# Patient Record
Sex: FEMALE | Race: WHITE | Employment: FULL TIME | URBAN - METROPOLITAN AREA
[De-identification: names, ages, dates, MRNs, and addresses within clinical notes are randomized per-mention and may not be internally consistent; named-entity substitution may affect disease eponyms.]

---

## 2018-01-09 ENCOUNTER — OFFICE VISIT (OUTPATIENT)
Dept: ORTHOPEDIC SURGERY | Age: 58
End: 2018-01-09

## 2018-01-09 VITALS
DIASTOLIC BLOOD PRESSURE: 77 MMHG | WEIGHT: 159 LBS | HEIGHT: 65 IN | SYSTOLIC BLOOD PRESSURE: 137 MMHG | RESPIRATION RATE: 14 BRPM | BODY MASS INDEX: 26.49 KG/M2 | HEART RATE: 78 BPM | OXYGEN SATURATION: 100 %

## 2018-01-09 DIAGNOSIS — M25.511 RIGHT SHOULDER PAIN, UNSPECIFIED CHRONICITY: ICD-10-CM

## 2018-01-09 DIAGNOSIS — M75.51 SUBACROMIAL BURSITIS OF RIGHT SHOULDER JOINT: Primary | ICD-10-CM

## 2018-01-09 RX ORDER — ZOLPIDEM TARTRATE 5 MG/1
5 TABLET ORAL
COMMUNITY

## 2018-01-09 RX ORDER — TRIAMCINOLONE ACETONIDE 40 MG/ML
40 INJECTION, SUSPENSION INTRA-ARTICULAR; INTRAMUSCULAR ONCE
Qty: 1 ML | Refills: 0
Start: 2018-01-09 | End: 2018-01-09

## 2018-01-09 NOTE — PROGRESS NOTES
Rebekah Fatima  1960   Chief Complaint   Patient presents with    Shoulder Pain     RT        HISTORY OF PRESENT ILLNESS  Rebekah Fatima is a 59 y.o. female who presents today for evaluation of right shoulder pain. Patient was seen by Dr. Bekah Brennan in the past. she rates her pain 8/10 today. Pain has been present for three weeks. She has a knot over the right shoulder. Patient describes the pain as aching, numbing and throbbing that is Constant in nature. Symptoms are worse with certain motions of the arm, Activity and is better with  Rest. Associated symptoms include pulling, numbing, Swelling. Since problem started, it: has worsened. Pain does wake patient up at night. Has taken half a Vicodin for the problem. Has tried following treatments: Injections:NO; Brace:NO; Therapy:NO; Cane/Crutch:NO       No Known Allergies     Past Medical History:   Diagnosis Date    Bell's palsy     Cardiac nuclear imaging test, normal 07/13/2012    Low risk. No ischemia or prior infarction. No RWMA. EF 64%. Nondiagnostic EKG on EST. Ex time 10 min 41 sec.  Lump     Palm of the left hand      Social History     Social History    Marital status:      Spouse name: N/A    Number of children: N/A    Years of education: N/A     Occupational History    Not on file.      Social History Main Topics    Smoking status: Never Smoker    Smokeless tobacco: Never Used    Alcohol use No    Drug use: Not on file    Sexual activity: Not on file     Other Topics Concern    Not on file     Social History Narrative      Past Surgical History:   Procedure Laterality Date    HAND/FINGER SURGERY UNLISTED Left     cyst removal    HX APPENDECTOMY      HX CHOLECYSTECTOMY      HX GASTRIC BYPASS      HX HYSTERECTOMY  2008      Family History   Problem Relation Age of Onset    Diabetes Mother     Hypertension Mother       Current Outpatient Prescriptions   Medication Sig    zolpidem (AMBIEN) 5 mg tablet Take  by mouth nightly as needed for Sleep.  triamcinolone acetonide (KENALOG) 40 mg/mL injection 1 mL by IntraMUSCular route once for 1 dose. No current facility-administered medications for this visit. REVIEW OF SYSTEM   Patient denies: Weight loss, Fever/Chills, HA, Visual changes, Fatigue, Chest pain, SOB, Abdominal pain, N/V/D/C, Blood in stool or urine, Edema. Pertinent positive as above in HPI. All others were negative    PHYSICAL EXAM:   Visit Vitals    /77 (BP 1 Location: Left arm, BP Patient Position: Sitting)    Pulse 78    Resp 14    Ht 5' 5\" (1.651 m)    Wt 159 lb (72.1 kg)    SpO2 100%    BMI 26.46 kg/m2     The patient is a well-developed, well-nourished female   in no acute distress. The patient is alert and oriented times three. The patient is alert and oriented times three. Mood and affect are normal.  LYMPHATIC: lymph nodes are not enlarged and are within normal limits  SKIN: normal in color and non tender to palpation. There are no bruises or abrasions noted. NEUROLOGICAL: Motor sensory exam is within normal limits. Reflexes are equal bilaterally. There is normal sensation to pinprick and light touch  MUSCULOSKELETAL:  Examination Right shoulder   Skin Intact   AC joint tenderness -   Biceps tenderness -   Forward flexion/Elevation    Active abduction    Glenohumeral abduction 90   External rotation ROM 90   Internal rotation ROM 70   Apprehension -   Tatos Relocation -   Jerk -   Load and Shift -   Obriens -   Speeds -   Impingement sign +   Supraspinatus/Empty Can -, 5/5   External Rotation Strength -, 5/5   Lift Off/Belly Press -, 5/5   Neurovascular Intact       PROCEDURE: After sterile prep, 6 cc of Xylocaine and 1 cc of Kenalog were injected into the right shoulder.        [facility] - [facility]  OFFICE PROCEDURE PROGRESS NOTE        Chart reviewed for the following:  Hieu Gutierrez MD, have reviewed the History, Physical and updated the Allergic reactions for [address] performed immediately prior to start of procedure:  I, Catherine Staley MD, have performed the following reviews on Ankita Mckenna prior to the start of the procedure:            * Patient was identified by name and date of birth   * Agreement on procedure being performed was verified  * Risks and Benefits explained to the patient  * Procedure site verified and marked as necessary  * Patient was positioned for comfort  * Consent was signed and verified     Time: 9:47 AM    Date of procedure: 1/9/2018    Procedure performed by:  Catherine Staley MD    Provider assisted by: (see medication administration)    How tolerated by patient: tolerated the procedure well with no complications    Comments: none      IMAGING: XR of the right shoulder dated 1/9/18 was reviewed and read: normal      IMPRESSION:      ICD-10-CM ICD-9-CM    1. Subacromial bursitis of right shoulder joint M75.51 726.19 TRIAMCINOLONE ACETONIDE INJ      triamcinolone acetonide (KENALOG) 40 mg/mL injection      DRAIN/INJECT LARGE JOINT/BURSA   2. Right shoulder pain, unspecified chronicity M25.511 719.41 AMB POC XRAY, SHOULDER; COMPLETE, 2+        PLAN:  1. I would like to try an injection in her right shoulder to assess whether the pain is coming from her shoulder or neck. Risk factors include: gastric bypass, no NSAIDs  2. Yes cortisone injection indicated today R SHOULDER  3. No Physical/Occupational Therapy indicated today  4. No diagnostic test indicated today  5. No durable medical equipment indicated today  6. No referral indicated today   7. No medications indicated today  8. No Narcotic indicated today     RTC 2 weeks  Follow-up Disposition: Not on File    Scribed by [facility]) as dictated by MD DALTON Leon, Dr. Catherine Staley, confirm that all documentation is accurate.     Catherine Staley M.D.   [facility]

## 2018-07-24 ENCOUNTER — OFFICE VISIT (OUTPATIENT)
Dept: ORTHOPEDIC SURGERY | Age: 58
End: 2018-07-24

## 2018-07-24 VITALS
DIASTOLIC BLOOD PRESSURE: 61 MMHG | HEIGHT: 65 IN | RESPIRATION RATE: 16 BRPM | SYSTOLIC BLOOD PRESSURE: 119 MMHG | HEART RATE: 63 BPM | TEMPERATURE: 97.6 F | WEIGHT: 166.8 LBS | BODY MASS INDEX: 27.79 KG/M2 | OXYGEN SATURATION: 100 %

## 2018-07-24 DIAGNOSIS — M75.51 SUBACROMIAL BURSITIS OF RIGHT SHOULDER JOINT: Primary | ICD-10-CM

## 2018-07-24 DIAGNOSIS — M54.12 CERVICAL RADICULOPATHY: ICD-10-CM

## 2018-07-24 RX ORDER — BACLOFEN 10 MG/1
10 TABLET ORAL 3 TIMES DAILY
Qty: 40 TAB | Refills: 0 | Status: SHIPPED | OUTPATIENT
Start: 2018-07-24 | End: 2018-11-04

## 2018-07-24 RX ORDER — TRIAMCINOLONE ACETONIDE 40 MG/ML
40 INJECTION, SUSPENSION INTRA-ARTICULAR; INTRAMUSCULAR ONCE
Qty: 1 ML | Refills: 0
Start: 2018-07-24 | End: 2018-07-24

## 2018-07-24 RX ORDER — IBUPROFEN 400 MG/1
TABLET ORAL
COMMUNITY
End: 2022-08-23

## 2018-07-24 NOTE — PROGRESS NOTES
Ritesh Camacho 1960 Chief Complaint Patient presents with  Shoulder Pain  
  R SHOULDER PAIN, F/U APPT. HISTORY OF PRESENT ILLNESS Ritesh Camacho is a 59 y.o. female who presents today for reevaluation of right shoulder pain. Patient rates pain as 8/10 today. has been present for months. She has a knot over the right shoulder. At last OV, patient had a cortisone injection which provided some relief, about 3 months. Patient notes some numbness in the arm. Constant dull pain without movements, but pain with certain movements. Patient denies any fever, chills, chest pain, shortness of breath or calf pain. There are no new illness or injuries to report since last seen in the office. There are no changes to medications, allergies, family or social history. PHYSICAL EXAM:  
Visit Vitals  /61  Pulse 63  Temp 97.6 °F (36.4 °C) (Oral)  Resp 16  
 Ht 5' 5\" (1.651 m)  Wt 166 lb 12.8 oz (75.7 kg)  SpO2 100%  BMI 27.76 kg/m2 The patient is a well-developed, well-nourished female   in no acute distress. The patient is alert and oriented times three. The patient is alert and oriented times three. Mood and affect are normal. 
LYMPHATIC: lymph nodes are not enlarged and are within normal limits SKIN: normal in color and non tender to palpation. There are no bruises or abrasions noted. NEUROLOGICAL: Motor sensory exam is within normal limits. Reflexes are equal bilaterally. There is normal sensation to pinprick and light touch MUSCULOSKELETAL: 
Examination Right shoulder Skin Intact AC joint tenderness - Biceps tenderness - Forward flexion/Elevation  Active abduction  Glenohumeral abduction 90 External rotation ROM 90 Internal rotation ROM 70 Apprehension -  
Tatos Relocation -  
Jerk - Load and Shift -  
Laverda Snowman - Speeds - Impingement sign + Supraspinatus/Empty Can + External Rotation Strength -, 5/5 Lift Off/Belly Press -, 5/5 Neurovascular Intact  
  
 
PROCEDURE: Right Shoulder Injection with Ultrasound Guidance After sterile prep, 6 cc of Xylocaine and 1 cc of Kenalog were injected into the right shoulder. Ultrasound images captured using [address] Ultrasound machine and scanned into patient's chart. [facility] [facility] 
OFFICE PROCEDURE PROGRESS NOTE Chart reviewed for the following: 
Enid Isaac M.D, have reviewed the History, Physical and updated the Allergic reactions for Omari Biggs TIME OUT performed immediately prior to start of procedure: 
I, Buck Foss M.D, have performed the following reviews on Omari Biggs prior to the start of the procedure: 
         
* Patient was identified by name and date of birth * Agreement on procedure being performed was verified * Risks and Benefits explained to the patient * Procedure site verified and marked as necessary * Patient was positioned for comfort * Consent was signed and verified Time: 8:31 AM  
 
Date of procedure: 7/24/2018 Procedure performed by:  Buck Fsos M.D Provider assisted by: (see medication administration) How tolerated by patient: tolerated the procedure well with no complications Comments: none IMAGING: XR of the right shoulder dated 1/9/18 was reviewed and read: normal 
 
IMPRESSION:   
  ICD-10-CM ICD-9-CM 1. Subacromial bursitis of right shoulder joint M75.51 726.19 REFERRAL TO PHYSICAL THERAPY  
   MRI SHOULDER RT WO CONT  
   TRIAMCINOLONE ACETONIDE INJ  
   triamcinolone acetonide (KENALOG) 40 mg/mL injection US GUIDE INJ/ASP/ARTHRO LG JNT/BURSA  
   baclofen (LIORESAL) 10 mg tablet 2. Cervical radiculopathy M54.12 723.4 REFERRAL TO PHYSICAL THERAPY  
   baclofen (LIORESAL) 10 mg tablet PLAN:  
1. Patient presents today with returning shoulder and neck pain.  I would like her to get an MRI and I am hopeful an injection today will help. Risk factors include: gastric bypass, no NSAIDs 2. No ultrasound exam indicated today 3. Yes cortisone injection indicated today R SHOULDER US 
4. Yes Physical Therapy indicated today 5. Yes diagnostic test indicated today: MRI R SHOULDER 
6. No durable medical equipment indicated today 7. No referral indicated today 8. Yes medications indicated today: BACLOFEN 9. No Narcotic indicated today RTC following MRI Follow-up Disposition: Not on File Scribed by [facility] ([address] [place] [address] [address]) as dictated by Rolando Haines MD 
 
I, Dr. Rolando Haines, confirm that all documentation is accurate.  
 
Rolando Haines M.D.  
[facility]

## 2018-07-30 ENCOUNTER — HOSPITAL ENCOUNTER (OUTPATIENT)
Age: 58
Discharge: HOME | End: 2018-07-30
Attending: ORTHOPAEDIC SURGERY
Payer: COMMERCIAL

## 2018-07-30 DIAGNOSIS — M75.51 SUBACROMIAL BURSITIS OF RIGHT SHOULDER JOINT: ICD-10-CM

## 2018-07-30 PROCEDURE — 73221 MRI JOINT UPR EXTREM W/O DYE: CPT

## 2018-08-08 ENCOUNTER — OFFICE VISIT (OUTPATIENT)
Dept: ORTHOPEDIC SURGERY | Age: 58
End: 2018-08-08

## 2018-08-08 VITALS
RESPIRATION RATE: 16 BRPM | HEIGHT: 65 IN | WEIGHT: 162 LBS | SYSTOLIC BLOOD PRESSURE: 127 MMHG | HEART RATE: 85 BPM | OXYGEN SATURATION: 96 % | DIASTOLIC BLOOD PRESSURE: 73 MMHG | BODY MASS INDEX: 26.99 KG/M2 | TEMPERATURE: 97.8 F

## 2018-08-08 DIAGNOSIS — M75.121 COMPLETE TEAR OF RIGHT ROTATOR CUFF: Primary | ICD-10-CM

## 2018-08-08 NOTE — PROGRESS NOTES
Sherryle Limerick  1960   Chief Complaint   Patient presents with    Shoulder Pain     right shoulder MRI results         HISTORY OF PRESENT ILLNESS  Sherryle Limerick is a 59 y.o. female who presents today for reevaluation of right shoulder pain and MRI review Patient rates pain as 3/10 today. Pain has been present for months. She has a knot over the right shoulder. At last OV, patient had a cortisone injection which provided good relief. Patient notes some numbness in the arm. Constant dull pain without movements, but pain with certain movements. Patient notes she is left-handed, therefore she doesn't have to use her right arm very much. Patient denies any fever, chills, chest pain, shortness of breath or calf pain. There are no new illness or injuries to report since last seen in the office. There are no changes to medications, allergies, family or social history. PHYSICAL EXAM:   Visit Vitals    /73    Pulse 85    Temp 97.8 °F (36.6 °C) (Oral)    Resp 16    Ht 5' 5\" (1.651 m)    Wt 162 lb (73.5 kg)    SpO2 96%    BMI 26.96 kg/m2     The patient is a well-developed, well-nourished female   in no acute distress. The patient is alert and oriented times three. The patient is alert and oriented times three. Mood and affect are normal.  LYMPHATIC: lymph nodes are not enlarged and are within normal limits  SKIN: normal in color and non tender to palpation. There are no bruises or abrasions noted. NEUROLOGICAL: Motor sensory exam is within normal limits. Reflexes are equal bilaterally.  There is normal sensation to pinprick and light touch  MUSCULOSKELETAL:  Examination Right shoulder   Skin Intact   AC joint tenderness -   Biceps tenderness -   Forward flexion/Elevation    Active abduction    Glenohumeral abduction 90   External rotation ROM 90   Internal rotation ROM 70   Apprehension -   Tatos Relocation -   Jerk -   Load and Shift -   Obriens -   Speeds -   Impingement sign +   Supraspinatus/Empty Can +   External Rotation Strength -, 5/5   Lift Off/Belly Press -, 5/5   Neurovascular Intact        IMAGING: MRI of the right shoulder dated 7/30/18 was reviewed and read: Impression:  1. Supraspinatus severe tendinosis and full-thickness tendon rupture at the  insertion as described in findings. 2. Infraspinatus moderate tendinosis with undersurface fraying and small  interstitial partial thickness tear. 3. Subscapularis mild tendinosis and punctate interstitial tears at the  insertion. 4. Intra-articular biceps tendinosis and likely partial split tearing at the  entrance of the groove. 5. Degeneration of the superior labrum. 6. Subacromial spurring and subacromial subdeltoid bursitis. XR of the right shoulder dated 1/9/18 was reviewed and read: normal    IMPRESSION:      ICD-10-CM ICD-9-CM    1. Complete tear of right rotator cuff M75.121 727.61         PLAN:   1. I discussed the risks and benefits and potential adverse outcomes of both operative vs non operative treatment of rotator cuff tear of the right shoulder with the patient. Patient wishes to proceed with arthroscopic rotator cuff repair. Risks of operative intervention include but not limited to bleeding, infection, deep vein thrombosis, pulmonary embolism, death, limb length discrepancy, reflexive sympathetic dystrophy, fat embolism syndrome,damage to blood vessels and nerves, malunion, non-union, delayed union, failure of hardware, post traumatic arthritis, stroke, heart attack, and death. Patient understands that infection may arise and may require numerous surgeries. History and physical exam scheduled for a later date. Risk factors include: gastric bypass, no NSAIDs  2. No ultrasound exam indicated today  3. No cortisone injection indicated today   4. No Physical Therapy indicated today  5. No diagnostic test indicated today:    6. No durable medical equipment indicated today  7.  No referral indicated today   8. No medications indicated today:    9.  No Narcotic indicated today       RTC H&P  Follow-up Disposition: Not on File    Scribed by Thien Nascimento [facility]) as dictated by LUIS Crockett, [facility]

## 2018-08-09 DIAGNOSIS — Z01.818 PREOP EXAMINATION: ICD-10-CM

## 2018-08-09 DIAGNOSIS — M75.121 COMPLETE TEAR OF RIGHT ROTATOR CUFF: Primary | ICD-10-CM

## 2018-08-28 ENCOUNTER — TELEPHONE (OUTPATIENT)
Dept: ORTHOPEDIC SURGERY | Age: 58
End: 2018-08-28

## 2018-08-28 RX ORDER — BACLOFEN 10 MG/1
10 TABLET ORAL 3 TIMES DAILY
Qty: 90 TAB | Refills: 1 | Status: SHIPPED | OUTPATIENT
Start: 2018-08-28 | End: 2018-09-11 | Stop reason: SDUPTHER

## 2018-08-28 NOTE — TELEPHONE ENCOUNTER
Patient is scheduled for RCR sx on 9/20 and is having a lot of pain in the shoulder that is traveling to her neck. She is requesting something for pain until her sx date. She states she cannot take any anti inflammatory medication due to having gastric bypass in the past.  Patient uses [employer] [facility] [employer]. Please advise.

## 2018-09-07 ENCOUNTER — HOSPITAL ENCOUNTER (OUTPATIENT)
Dept: LAB | Age: 58
Discharge: HOME | End: 2018-09-07
Payer: COMMERCIAL

## 2018-09-07 DIAGNOSIS — Z01.818 PREOP EXAMINATION: ICD-10-CM

## 2018-09-07 LAB
ANION GAP SERPL CALC-SCNC: 11 MMOL/L (ref 3–18)
BASOPHILS # BLD: 0 K/UL (ref 0–0.1)
BASOPHILS NFR BLD: 0 % (ref 0–2)
BUN SERPL-MCNC: 16 MG/DL (ref 7–18)
BUN/CREAT SERPL: 18 (ref 12–20)
CALCIUM SERPL-MCNC: 9.3 MG/DL (ref 8.5–10.1)
CHLORIDE SERPL-SCNC: 108 MMOL/L (ref 100–108)
CO2 SERPL-SCNC: 25 MMOL/L (ref 21–32)
CREAT SERPL-MCNC: 0.88 MG/DL (ref 0.6–1.3)
DIFFERENTIAL METHOD BLD: ABNORMAL
EOSINOPHIL # BLD: 0.1 K/UL (ref 0–0.4)
EOSINOPHIL NFR BLD: 1 % (ref 0–5)
ERYTHROCYTE [DISTWIDTH] IN BLOOD BY AUTOMATED COUNT: 16.5 % (ref 11.6–14.5)
GLUCOSE SERPL-MCNC: 86 MG/DL (ref 74–99)
HCT VFR BLD AUTO: 30.3 % (ref 35–45)
HGB BLD-MCNC: 9.3 G/DL (ref 12–16)
LYMPHOCYTES # BLD: 2.9 K/UL (ref 0.9–3.6)
LYMPHOCYTES NFR BLD: 39 % (ref 21–52)
MCH RBC QN AUTO: 23 PG (ref 24–34)
MCHC RBC AUTO-ENTMCNC: 30.7 G/DL (ref 31–37)
MCV RBC AUTO: 75 FL (ref 74–97)
MONOCYTES # BLD: 0.8 K/UL (ref 0.05–1.2)
MONOCYTES NFR BLD: 10 % (ref 3–10)
NEUTS SEG # BLD: 3.7 K/UL (ref 1.8–8)
NEUTS SEG NFR BLD: 50 % (ref 40–73)
PLATELET # BLD AUTO: 386 K/UL (ref 135–420)
PMV BLD AUTO: 11.3 FL (ref 9.2–11.8)
POTASSIUM SERPL-SCNC: 4.7 MMOL/L (ref 3.5–5.5)
RBC # BLD AUTO: 4.04 M/UL (ref 4.2–5.3)
SODIUM SERPL-SCNC: 144 MMOL/L (ref 136–145)
WBC # BLD AUTO: 7.5 K/UL (ref 4.6–13.2)

## 2018-09-07 PROCEDURE — 93005 ELECTROCARDIOGRAM TRACING: CPT

## 2018-09-07 PROCEDURE — 80048 BASIC METABOLIC PNL TOTAL CA: CPT | Performed by: PHYSICIAN ASSISTANT

## 2018-09-07 PROCEDURE — 36415 COLL VENOUS BLD VENIPUNCTURE: CPT | Performed by: PHYSICIAN ASSISTANT

## 2018-09-07 PROCEDURE — 85025 COMPLETE CBC W/AUTO DIFF WBC: CPT | Performed by: PHYSICIAN ASSISTANT

## 2018-09-08 LAB
ATRIAL RATE: 61 BPM
CALCULATED P AXIS, ECG09: 33 DEGREES
CALCULATED R AXIS, ECG10: -13 DEGREES
CALCULATED T AXIS, ECG11: 0 DEGREES
DIAGNOSIS, 93000: NORMAL
P-R INTERVAL, ECG05: 150 MS
Q-T INTERVAL, ECG07: 390 MS
QRS DURATION, ECG06: 70 MS
QTC CALCULATION (BEZET), ECG08: 392 MS
VENTRICULAR RATE, ECG03: 61 BPM

## 2018-09-11 ENCOUNTER — OFFICE VISIT (OUTPATIENT)
Dept: ORTHOPEDIC SURGERY | Age: 58
End: 2018-09-11

## 2018-09-11 VITALS
DIASTOLIC BLOOD PRESSURE: 61 MMHG | TEMPERATURE: 98.4 F | HEIGHT: 65 IN | OXYGEN SATURATION: 97 % | HEART RATE: 79 BPM | BODY MASS INDEX: 26.99 KG/M2 | SYSTOLIC BLOOD PRESSURE: 137 MMHG | RESPIRATION RATE: 16 BRPM | WEIGHT: 162 LBS

## 2018-09-11 DIAGNOSIS — M75.121 COMPLETE TEAR OF RIGHT ROTATOR CUFF: Primary | ICD-10-CM

## 2018-09-11 RX ORDER — GABAPENTIN 300 MG/1
300 CAPSULE ORAL
Qty: 5 CAP | Refills: 0 | Status: SHIPPED | OUTPATIENT
Start: 2018-09-11 | End: 2018-09-16

## 2018-09-11 RX ORDER — OXYCODONE HYDROCHLORIDE 5 MG/1
5 TABLET ORAL
Qty: 40 TAB | Refills: 0 | Status: SHIPPED | OUTPATIENT
Start: 2018-09-11 | End: 2018-11-04

## 2018-09-11 NOTE — PROGRESS NOTES
HISTORY AND PHYSICAL Patient: Shari Stacy                MRN: [id number]       SSN: [id number] YOB: 1960          AGE: 59 y.o. SEX: female Patient scheduled for:  Right shoulder rotator cuff repair Surgeon: Ariadna Sow MD 
 
ANESTHESIA TYPE:  General 
 
HISTORY:  
 
The patient was seen in the office today for a preoperative history and physical for an upcoming above listed surgery. The patient is a pleasant 59 y.o. female who has a history of right shoulder pain. Pain has been present for months. She has a knot over the right shoulder. At last OV, patient had a cortisone injection which provided good relief. Patient notes some numbness in the arm. Constant dull pain without movements, but pain with certain movements. Patient notes she is left-handed, therefore she doesn't have to use her right arm very much. . Pain level is a 4/10. Due to the current findings, affected activity of daily living and continued pain and discomfort, surgical intervention is indicated. The alternatives, risks, and complications, including but not limited to infection, blood loss, need for blood transfusion, neurovascular damage, isaac-incisional numbness, subcutaneous hematoma, bone fracture, anesthetic complications, DVT, PE, death, RSD, postoperative stiffness and pain, possible surgical scar, delayed healing and nonhealing, reflexive sympathetic dystrophy, damage to blood vessels and nerves, need for more surgery, MI, and stroke,  failure of hardware, gait disturbances,have been discussed. The patient understands and wishes to proceed with surgery. PAST MEDICAL HISTORY:  
 
Past Medical History:  
Diagnosis Date  Bell's palsy  Cardiac nuclear imaging test, normal 07/13/2012 Low risk. No ischemia or prior infarction. No RWMA. EF 64%. Nondiagnostic EKG on EST. Ex time 10 min 41 sec.  Lump Palm of the left hand CURRENT MEDICATIONS:  
 
 Current Outpatient Prescriptions Medication Sig Dispense Refill  gabapentin (NEURONTIN) 300 mg capsule Take 1 Cap by mouth nightly for 5 days. START NIGHT OF SURGERY 5 Cap 0  
 oxyCODONE IR (ROXICODONE) 5 mg immediate release tablet Take 1 Tab by mouth every four (4) hours as needed for Pain. Max Daily Amount: 30 mg. DO NOT TAKE UNTIL AFTER SURGERY 40 Tab 0  
 baclofen (LIORESAL) 10 mg tablet Take 1 Tab by mouth three (3) times daily. 40 Tab 0  
 zolpidem (AMBIEN) 5 mg tablet Take  by mouth nightly as needed for Sleep.  ibuprofen (MOTRIN) 400 mg tablet Take  by mouth every six (6) hours as needed for Pain. ALLERGIES:  
 
No Known Allergies SURGICAL HISTORY:  
 
Past Surgical History:  
Procedure Laterality Date  HAND/FINGER SURGERY UNLISTED Left   
 cyst removal  
 HX APPENDECTOMY  HX CHOLECYSTECTOMY  HX GASTRIC BYPASS  HX HYSTERECTOMY  2008 SOCIAL HISTORY:  
 
Social History Social History  Marital status:  Spouse name: N/A  
 Number of children: N/A  
 Years of education: N/A Social History Main Topics  Smoking status: Never Smoker  Smokeless tobacco: Never Used  Alcohol use No  
 Drug use: Not on file  Sexual activity: Not on file Other Topics Concern  Not on file Social History Narrative FAMILY HISTORY:  
 
Family History Problem Relation Age of Onset  Diabetes Mother  Hypertension Mother REVIEW OF SYSTEMS:  
 
Negative for fevers, chills, chest pain, shortness of breath, weight loss, recent illness General: Negative for fever and chills. No unexpected change in weight. Denies fatigue. No change in appetite. Skin: Negative for rash or itching. HEENT: Negative for congestion, sore throat, neck pain and neck stiffness. No change in vision or hearing. Hasn't noted any enlarged lymph nodes in the neck. Cardiovascular:  Negative for chest pain and palpitations.   Has not noted pedal edema. Respiratory: Negative for cough, colds, sinus, hemoptysis, shortness of breath and wheezing. Gastrointestinal: Negative for nausea and vomiting, rectal bleeding, coffee ground emesis, abdominal pain, diarrhea and constipation. Genitourinary: Negative for dysuria, frequency urgency, or burning on micturition. No flank pain, no foul smelling urine, no difficulty with initiating urination. Hematological: Negative for bleeding or easy bruising. Musculoskeletal: Negative  for arthralgias, back pain or neck pain. Neurological: Negative for dizziness, seizures or syncopal episodes. Denies headaches. Endocrine: Denies excessive thirst.  No heat/cold intolerance. Psychiatric: Negative for depression or insomnia. PHYSICAL EXAMINATION:  
 
VITALS:  
Visit Vitals  /61  Pulse 79  Temp 98.4 °F (36.9 °C) (Oral)  Resp 16  
 Ht 5' 5\" (1.651 m)  Wt 162 lb (73.5 kg)  SpO2 97%  BMI 26.96 kg/m2 GEN:  Well developed, well nourished 59 y.o. female in no acute distress. HEENT: Normocephalic and atraumatic. Eyes: Conjunctivae and EOM are normal.Pupils are equal, round, and reactive to light. External ear normal appearance, external nose normal appearing. Mouth/Throat: Oropharynx is clear and moist, able to handle oral secretions w/out difficulty, airway patent NECK: Supple. Normal ROM, No lymphadenopathy. Trachea is midline. No bruising, swelling or deformity RESP: Clear to auscultation bilaterally. No wheezes, rales, rhonchi. Normal effort and breath sounds. No respiratory distress CARDIO:  Normal rate, regular rhythm and normal heart sounds. No MGR. ABDOMEN: Soft, non-tender, non-distended, normoactive bowel sounds in all four quadrants. There is no tenderness. There is no rebound and no guarding. BACK: No CVA or spinal tenderness BREAST:  Deferred PELVIC:    Deferred RECTAL:  Deferred :           Deferred EXTREMITIES: EXAMINATION OF: right shoulder Examination Right shoulder Skin Intact AC joint tenderness - Biceps tenderness - Forward flexion/Elevation  Active abduction  Glenohumeral abduction 90 External rotation ROM 90 Internal rotation ROM 70 Apprehension -  
Larissa Relocation -  
Jerk - Load and Shift -  
Reji Landa - Speeds - Impingement sign + Supraspinatus/Empty Can + External Rotation Strength -, 5/5 Lift Off/Belly Press -, 5/5 Neurovascular Intact NEUROVASCULAR: Sensation intact to light touch and strength grossly intact and symmetrical. No nystagmus. Positive distal pulses and capillary refill. DVT ASSESSMENT:  There is not  calf tenderness. No evidence of DVT seen on physical exam. 
MOTOR: In tact PSYCH: Alert an oriented to person, place and time. Mood, memory, affect, behavior and judgment normal 
  
 
RADIOGRAPHS & DIAGNOSTIC STUDIES:  
 
MRI/xray reveals : MRI of the right shoulder dated 7/30/18 was reviewed and read: Impression: 1. Supraspinatus severe tendinosis and full-thickness tendon rupture at the 
insertion as described in findings. 2. Infraspinatus moderate tendinosis with undersurface fraying and small 
interstitial partial thickness tear. 3. Subscapularis mild tendinosis and punctate interstitial tears at the 
insertion. 4. Intra-articular biceps tendinosis and likely partial split tearing at the 
entrance of the groove. 5. Degeneration of the superior labrum. 6. Subacromial spurring and subacromial subdeltoid bursitis. 
  
XR of the right shoulder dated 1/9/18 was reviewed and read: normal 
 
 
LABS:  
 
 
@ 
CBC:  
Lab Results Component Value Date/Time WBC 7.5 09/07/2018 05:04 PM  
 RBC 4.04 (L) 09/07/2018 05:04 PM  
 HGB 9.3 (L) 09/07/2018 05:04 PM  
 HCT 30.3 (L) 09/07/2018 05:04 PM  
 PLATELET 084 91/24/9083 05:04 PM  
 and BMP:  
Lab Results Component Value Date/Time  Glucose 86 09/07/2018 05:04 PM  
 Sodium 144 09/07/2018 05:04 PM  
 Potassium 4.7 09/07/2018 05:04 PM  
 Chloride 108 09/07/2018 05:04 PM  
 CO2 25 09/07/2018 05:04 PM  
 BUN 16 09/07/2018 05:04 PM  
 Creatinine 0.88 09/07/2018 05:04 PM  
 Calcium 9.3 09/07/2018 05:04 PM  
@ Preoperative labs were reviewed and are substantially within normal limits EKG: Normal sinus rhythm Low voltage QRS Nonspecific T wave abnormality Abnormal ECG When compared with ECG of 11-OCT-2016 09:00,  
Nonspecific T wave abnormality no longer evident in Anterolateral leads Confirmed by Ashely Siegel MD, Camryn Coronado ([id number]) on 9/8/2018 4:01:32 PM   
 
 
ASSESSMENT:  
 
 
Encounter Diagnosis Name Primary?  Complete tear of right rotator cuff Yes PLAN:  
 
Again, the alternatives, risks, and complications, as well as expected outcome were discussed. The patient understands and agrees to proceed with right shoulder arthroscopic rotator cuff repair. Patient given orders listed below: 
 
Orders Placed This Encounter  gabapentin (NEURONTIN) 300 mg capsule  oxyCODONE IR (ROXICODONE) 5 mg immediate release tablet Torito Sparks PA-C 
9/11/2018 10:17 AM

## 2018-09-21 ENCOUNTER — HOSPITAL ENCOUNTER (OUTPATIENT)
Dept: PHYSICAL THERAPY | Age: 58
Discharge: HOME | End: 2018-09-21
Payer: COMMERCIAL

## 2018-09-21 PROCEDURE — 97016 VASOPNEUMATIC DEVICE THERAPY: CPT

## 2018-09-21 PROCEDURE — 97110 THERAPEUTIC EXERCISES: CPT

## 2018-09-21 PROCEDURE — 97140 MANUAL THERAPY 1/> REGIONS: CPT

## 2018-09-21 PROCEDURE — 97162 PT EVAL MOD COMPLEX 30 MIN: CPT

## 2018-09-21 NOTE — PROGRESS NOTES
In [address]  [place] [place] [address] [place], [address] 
[phone] fax Plan of Care/ Statement of Necessity for Physical Therapy Services Patient name: Ramón Christie Start of Care: 2018 Referral source: Cat Gonzales,* : 1960 Medical Diagnosis: Complete rotator cuff tear or rupture of right shoulder, not specified as traumatic [M75.121] Onset Date:18 Treatment Diagnosis: s/p right RTC repair Prior Hospitalization: see medical history Provider#: [id number] Medications: Verified on Patient summary List  
 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 Assessment/ key information: 59y.o. year old female presents with CC of right shoulder pain s/p RTC repair. Impairments noted: decreased ST/GHJ mobs in right shoulder; decreased right sh PROM, strength and stability. Patient will benefit from physical therapy to address deficits, and ultimately to return patient to prior level of function. Evaluation Complexity History LOW Complexity : Zero comorbidities / personal factors that will impact the outcome / POC; Examination MEDIUM Complexity : 3 Standardized tests and measures addressing body structure, function, activity limitation and / or participation in recreation  ;Presentation MEDIUM Complexity : Evolving with changing characteristics  ; Clinical Decision Making MEDIUM Complexity : FOTO score of 26-74 Overall Complexity Rating: MEDIUM Problem List: pain affecting function, decrease ROM, decrease strength, edema affecting function, decrease ADL/ functional abilitiies, decrease activity tolerance and decrease flexibility/ joint mobility Treatment Plan may include any combination of the following: Therapeutic exercise, Neuromuscular re-education, Physical agent/modality, Manual therapy and Patient education Patient / Family readiness to learn indicated by: asking questions, trying to perform skills and interest 
Persons(s) to be included in education: patient (P) Barriers to Learning/Limitations: None Patient Goal (s): to get my shoulder back to normal Patient Self Reported Health Status: good Rehabilitation Potential: good Short Term Goals: To be accomplished in 2 weeks: 1. I and compliant with HEP for self management of symptoms. 2. Increase passive elevation to 90 degrees to increase ease with dressing. Long Term Goals: To be accomplished in 4 weeks: 1. Improve FOTO to 72 to indicate improved function with daily activities. 2. Increase right passive elevation to 120 to increase ease with AAROM for ADLs. 3. Increase right passive ER to 45 degrees to increase ease with eating. Frequency / Duration: Patient to be seen 2 times per week for 4 weeks. Patient/ Caregiver education and instruction: Diagnosis, prognosis, exercises 
 [x]  Plan of care has been reviewed with ABILIO Judge, PT 9/21/2018 11:48 AM 
 
________________________________________________________________________ I certify that the above Therapy Services are being furnished while the patient is under my care. I agree with the treatment plan and certify that this therapy is necessary. [de-identified] Signature:____________Date:_________TIME:________ 
 
[employer], Date and Time must be completed for valid certification ** Please sign and return to In [address] [place], [address] 
[phone] fax

## 2018-09-21 NOTE — PROGRESS NOTES
PT DAILY TREATMENT NOTE  Patient Name: Laci Azar Date:2018 : 1960 [x]  Patient  Verified Payor: BLUE CROSS / Plan: [place] / Product Type: PPO / In time:1058  Out time:1139 Total Treatment Time (min): 41 Visit #: 1 of 8 Treatment Area: Complete rotator cuff tear or rupture of right shoulder, not specified as traumatic [M75.121] SUBJECTIVE Pain Level (0-10 scale): 4-5 Any medication changes, allergies to medications, adverse drug reactions, diagnosis change, or new procedure performed?: [x] No    [] Yes (see summary sheet for update) Subjective functional status/changes:   [] No changes reported See eval 
 
OBJECTIVE Modality rationale: decrease pain to improve the patients ability to tolerate post exercise soreness Min Type Additional Details  
 [] Estim:  []Unatt       []IFC  []Premod []Other:  []w/ice   []w/heat Position: Location:  
 [] Estim: []Att    []TENS instruct  []NMES []Other:  []w/US   []w/ice   []w/heat Position: Location:  
 []  Traction: [] Cervical       []Lumbar 
                     [] Prone          []Supine []Intermittent   []Continuous Lbs: 
[] before manual 
[] after manual  
 []  Ultrasound: []Continuous   [] Pulsed []1MHz   []3MHz W/cm2: 
Location:  
 []  Iontophoresis with dexamethasone Location: [] Take home patch  
[] In clinic  
 []  Ice     []  heat 
[]  Ice massage 
[]  Laser  
[]  Anodyne Position: Location:  
 []  Laser with stim 
[]  Other:  Position: Location:  
10 [x]  Vasopneumatic Device Pressure:       [x] lo [] med [] hi  
Temperature: [x] lo [] med [] hi  
[] Skin assessment post-treatment:  []intact []redness- no adverse reaction 
  []redness  adverse reaction:  
 
12 min [x]Eval                  []Re-Eval  
 
 
11 min Therapeutic Exercise:  [] See flow sheet :  
 Rationale: increase ROM to improve the patients ability to perform ADLs 8 min Manual Therapy:  Per flow sheet Rationale: decrease pain and increase ROM to perform ADLs With 
 [] TE 
 [] TA 
 [] neuro 
 [] other: Patient Education: [x] Review HEP [] Progressed/Changed HEP based on:  
[] positioning   [] body mechanics   [] transfers   [] heat/ice application   
[] other:   
 
Other Objective/Functional Measures:   
 
Pain Level (0-10 scale) post treatment: 4 
 
ASSESSMENT/Changes in Function: see POC Patient will continue to benefit from skilled PT services to modify and progress therapeutic interventions, address functional mobility deficits, address ROM deficits, address strength deficits, analyze and address soft tissue restrictions and analyze and cue movement patterns to attain remaining goals. [x]  See Plan of Care 
[]  See progress note/recertification 
[]  See Discharge Summary Progress towards goals / Updated goals: 
Short Term Goals: To be accomplished in 2 weeks: 1. I and compliant with HEP for self management of symptoms. 2. Increase passive elevation to 90 degrees to increase ease with dressing. Long Term Goals: To be accomplished in 4 weeks: 1. Improve FOTO to 72 to indicate improved function with daily activities. 2. Increase right passive elevation to 120 to increase ease with AAROM for ADLs. 3. Increase right passive ER to 45 degrees to increase ease with eating.  
  
 
PLAN 
[]  Upgrade activities as tolerated     [x]  Continue plan of care 
[]  Update interventions per flow sheet      
[]  Discharge due to:_ 
[]  Other:_ Jayla Varner, PT 9/21/2018  11:54 AM 
 
Future Appointments Date Time Provider Bryson Maxwell 9/24/2018 3:00 PM Sebastien Betancourt [facility]  
9/26/2018 4:00 PM Omid Earl PT [facility]  
9/27/2018 9:00 AM MENA Mclean [address]  
 10/1/2018 5:00 PM Kushal March, PT [facility]  
10/3/2018 5:30 PM Marvin Gallardo, ABILIO [facility]  
10/8/2018 5:30 PM Kushal March, PT [facility]  
10/10/2018 5:30 PM Marvin Gallardo, ABILIO [facility]

## 2018-09-24 ENCOUNTER — HOSPITAL ENCOUNTER (OUTPATIENT)
Dept: PHYSICAL THERAPY | Age: 58
Discharge: HOME | End: 2018-09-24
Payer: COMMERCIAL

## 2018-09-24 PROCEDURE — 97110 THERAPEUTIC EXERCISES: CPT

## 2018-09-24 PROCEDURE — 97140 MANUAL THERAPY 1/> REGIONS: CPT

## 2018-09-24 PROCEDURE — 97016 VASOPNEUMATIC DEVICE THERAPY: CPT

## 2018-09-24 NOTE — PROGRESS NOTES
PT DAILY TREATMENT NOTE 3-16 Patient Name: Alayna Carver Date:2018 : 1960 [x]  Patient  Verified Payor: BLUE CROSS / Plan: [place] [employer] / Product Type: PPO / In time:3:00  Out time:3:36 Total Treatment Time (min): 36 Visit #: 2 of 8 (one on one time: 21) Treatment Area: Complete rotator cuff tear or rupture of right shoulder, not specified as traumatic [M75.121] SUBJECTIVE Pain Level (0-10 scale): 2-3/10 Any medication changes, allergies to medications, adverse drug reactions, diagnosis change, or new procedure performed?: [x] No    [] Yes (see summary sheet for update) Subjective functional status/changes:   [] No changes reported \"I'm just a very stiff person. I want to return to work on Wednesday. \" OBJECTIVE Modality rationale: decrease edema, decrease inflammation and decrease pain to improve the patients ability to ease soreness after therapy Min Type Additional Details  
 [] Estim:  []Unatt       []IFC  []Premod []Other:  []w/ice   []w/heat Position: Location:  
 [] Estim: []Att    []TENS instruct  []NMES []Other:  []w/US   []w/ice   []w/heat Position: Location:  
 []  Traction: [] Cervical       []Lumbar 
                     [] Prone          []Supine []Intermittent   []Continuous Lbs: 
[] before manual 
[] after manual  
 []  Ultrasound: []Continuous   [] Pulsed []1MHz   []3MHz Location: 
W/cm2:  
 []  Iontophoresis with dexamethasone Location: [] Take home patch  
[] In clinic  
 []  Ice     []  heat 
[]  Ice massage 
[]  Laser  
[]  Anodyne Position: Location:  
 []  Laser with stim 
[]  Other: Position: Location:  
10 [x]  Vasopneumatic Device Pressure:       [x] lo [] med [] hi  
Temperature: [x] lo [] med [] hi  
[] Skin assessment post-treatment:  []intact []redness- no adverse reaction 
  []redness  adverse reaction: 16 min Therapeutic Exercise:  [x] See flow sheet :  
Rationale: increase ROM, increase strength and improve coordination to improve the patients ability to progress per protocol 10 min Manual Therapy:  Gentle right STJ mobs, right shoulder PROM per protocol Rationale: decrease pain, increase ROM and increase tissue extensibility to progress per protocol With 
 [] TE 
 [] TA 
 [] neuro 
 [] other: Patient Education: [x] Review HEP [] Progressed/Changed HEP based on:  
[] positioning   [] body mechanics   [] transfers   [] heat/ice application   
[] other:   
 
Other Objective/Functional Measures:  
First follow up session---cues sequencing and correct form throughout Pain Level (0-10 scale) post treatment: 2 
 
ASSESSMENT/Changes in Function:  
Initiated POC per flowsheet. Patient puts forth good effort with exercises and reports HEP compliance. Moderate muscle guarding, especially with PROM flexion. Pain is decreasing. Continue per protocol. Patient will continue to benefit from skilled PT services to modify and progress therapeutic interventions, address functional mobility deficits, address ROM deficits, address strength deficits, analyze and address soft tissue restrictions, analyze and cue movement patterns, analyze and modify body mechanics/ergonomics and assess and modify postural abnormalities to attain remaining goals. []  See Plan of Care 
[]  See progress note/recertification 
[]  See Discharge Summary Progress towards goals / Updated goals: 
Short Term Goals: To be accomplished in 2 weeks: 
                       0. I and compliant with HEP for self management of symptoms. Met per patient report (9/24/2018) 2. Increase passive elevation to 90 degrees to increase ease with dressing. Long Term Goals: To be accomplished in 4 weeks: 
                       1. Improve FOTO to 72 to indicate improved function with daily activities. 2. Increase right passive elevation to 120 to increase ease with AAROM for ADLs. 3. Increase right passive ER to 45 degrees to increase ease with eating. PLAN 
[]  Upgrade activities as tolerated     []  Continue plan of care 
[]  Update interventions per flow sheet      
[]  Discharge due to:_ 
[]  Other:_   
 
Rose Preciado 9/24/2018  3:02 PM 
 
Future Appointments Date Time Provider Bryson Maxwell 9/26/2018 4:00 PM [facility]  
9/27/2018 9:00 AM MENA Galvez [address]  
10/1/2018 5:00 PM [facility]  
10/3/2018 5:30 PM [facility]  
10/8/2018 5:30 PM [facility]  
10/10/2018 5:30 PM [facility]

## 2018-09-26 ENCOUNTER — HOSPITAL ENCOUNTER (OUTPATIENT)
Dept: PHYSICAL THERAPY | Age: 58
Discharge: HOME | End: 2018-09-26
Payer: COMMERCIAL

## 2018-09-26 PROCEDURE — 97016 VASOPNEUMATIC DEVICE THERAPY: CPT

## 2018-09-26 PROCEDURE — 97140 MANUAL THERAPY 1/> REGIONS: CPT

## 2018-09-26 NOTE — PROGRESS NOTES
PT DAILY TREATMENT NOTE 3-16 Patient Name: Ritesh Camacho Date:2018 : 1960 [x]  Patient  Verified Payor: BLUE CROSS / Plan: [place]OSS [employer]ER[place] / Product Type: PPO / In time:4:00  Out time:4:36 Total Treatment Time (min): 36 Visit #: 3 of 8 
1:1 time: 12min Treatment Area: Complete rotator cuff tear or rupture of right shoulder, not specified as traumatic [M75.121] SUBJECTIVE Pain Level (0-10 scale): 4/10 Any medication changes, allergies to medications, adverse drug reactions, diagnosis change, or new procedure performed?: [x] No    [] Yes (see summary sheet for update) Subjective functional status/changes:   [] No changes reported Pt reports she was sore after last session. She reports using ice for 3 hours today. OBJECTIVE Modality rationale: decrease edema, decrease inflammation and decrease pain to improve the patients ability to ease soreness after therapy Min Type Additional Details  
 [] Estim:  []Unatt       []IFC  []Premod []Other:  []w/ice   []w/heat Position: Location:  
 [] Estim: []Att    []TENS instruct  []NMES []Other:  []w/US   []w/ice   []w/heat Position: Location:  
 []  Traction: [] Cervical       []Lumbar 
                     [] Prone          []Supine []Intermittent   []Continuous Lbs: 
[] before manual 
[] after manual  
 []  Ultrasound: []Continuous   [] Pulsed []1MHz   []3MHz Location: 
W/cm2:  
 []  Iontophoresis with dexamethasone Location: [] Take home patch  
[] In clinic  
 []  Ice     []  heat 
[]  Ice massage 
[]  Laser  
[]  Anodyne Position: Location:  
 []  Laser with stim 
[]  Other: Position: Location:  
10 [x]  Vasopneumatic Device Pressure:       [x] lo [] med [] hi  
Temperature: [x] lo [] med [] hi  
[] Skin assessment post-treatment:  []intact []redness- no adverse reaction 
  []redness  adverse reaction: 14 min Therapeutic Exercise:  [x] See flow sheet :  
Rationale: increase ROM, increase strength and improve coordination to improve the patients ability to progress per protocol 12 min Manual Therapy:  Gentle right STJ mobs, right shoulder PROM per protocol Rationale: decrease pain, increase ROM and increase tissue extensibility to progress per protocol With 
 [] TE 
 [] TA 
 [] neuro 
 [] other: Patient Education: [x] Review HEP [] Progressed/Changed HEP based on:  
[] positioning   [] body mechanics   [] transfers   [] heat/ice application   
[] other:   
 
Other Objective/Functional Measures:  PROM elevation to 90 degrees, + guarding and attempting AROM with PROM, multiple vcing to relax Pain Level (0-10 scale) post treatment: 0 
 
ASSESSMENT/Changes in Function:  Pt demonstrates improved PROM of the right shoulder. She has difficulty relaxing for PROM and requires cues. RTC surgical precautions and ice protocol were reviewed with patient as well. Pt seemed to demonstrate better understanding. Patient will continue to benefit from skilled PT services to modify and progress therapeutic interventions, address functional mobility deficits, address ROM deficits, address strength deficits, analyze and address soft tissue restrictions, analyze and cue movement patterns, analyze and modify body mechanics/ergonomics and assess and modify postural abnormalities to attain remaining goals. []  See Plan of Care 
[]  See progress note/recertification 
[]  See Discharge Summary Progress towards goals / Updated goals: 
Short Term Goals: To be accomplished in 2 weeks: 
                       2. I and compliant with HEP for self management of symptoms. Met per patient report (9/24/2018) 2. Increase passive elevation to 90 degrees to increase ease with dressing.- MET 90 degrees 9-26-18 Long Term Goals: To be accomplished in 4 weeks:                        6. Improve FOTO to 72 to indicate improved function with daily activities. 2. Increase right passive elevation to 120 to increase ease with AAROM for ADLs. - progressing 90 degrees on 9-26-18 
3. Increase right passive ER to 45 degrees to increase ease with eating. PLAN 
[]  Upgrade activities as tolerated     [x]  Continue plan of care 
[]  Update interventions per flow sheet      
[]  Discharge due to:_ 
[]  Other:_ Carlos Dwyer, PT 9/26/2018  5:42 PM 
 
Future Appointments Date Time Provider Bryson Maxwell 9/27/2018 9:00 AM MENA [address]  
10/1/2018 5:00 PM [facility]  
10/3/2018 5:30 PM [facility]ffer [facility]  
10/8/2018 5:30 PM Robyn Hernandez, PT [facility]  
10/10/2018 5:30 PM Deborah Stauffer, ABILIO [facility]

## 2018-09-27 ENCOUNTER — OFFICE VISIT (OUTPATIENT)
Dept: ORTHOPEDIC SURGERY | Age: 58
End: 2018-09-27

## 2018-09-27 VITALS
DIASTOLIC BLOOD PRESSURE: 68 MMHG | SYSTOLIC BLOOD PRESSURE: 120 MMHG | TEMPERATURE: 98.1 F | BODY MASS INDEX: 26.86 KG/M2 | HEART RATE: 84 BPM | OXYGEN SATURATION: 97 % | HEIGHT: 65 IN | WEIGHT: 161.2 LBS | RESPIRATION RATE: 16 BRPM

## 2018-09-27 DIAGNOSIS — M75.121 COMPLETE TEAR OF RIGHT ROTATOR CUFF: Primary | ICD-10-CM

## 2018-09-27 RX ORDER — ACETAMINOPHEN 500 MG
TABLET ORAL
COMMUNITY
End: 2022-08-23

## 2018-09-27 RX ORDER — BISMUTH SUBSALICYLATE 262 MG
1 TABLET,CHEWABLE ORAL DAILY
COMMUNITY

## 2018-09-27 RX ORDER — LANOLIN ALCOHOL/MO/W.PET/CERES
1000 CREAM (GRAM) TOPICAL DAILY
COMMUNITY

## 2018-09-27 NOTE — PROGRESS NOTES
Ramón Christie 1960 HISTORY OF PRESENT ILLNESS Ramón Christie is a 59 y.o. female who presents today for evaluation s/p Right shoulder arthroscopic 2cm rotator cuff repair on 9/20/18. Patient has been going to PT. Describes pain as a 2/10. Has been taking minimal pain mediation for pain. Still has night pain. Patient denies any fever, chills, chest pain, shortness of breath or calf pain. The remainder of the review of systems is negative. There are no new illness or injuries to report since last seen in the office. No changes in medications, allergies, social or family history. PHYSICAL EXAM:  
Visit Vitals  /68  Pulse 84  Temp 98.1 °F (36.7 °C) (Oral)  Resp 16  
 Ht 5' 5\" (1.651 m)  Wt 161 lb 3.2 oz (73.1 kg)  SpO2 97%  BMI 26.83 kg/m2 The patient is a well-developed, well-nourished female in no acute distress. The patient is alert and oriented times three. The patient appears to be well groomed. Mood and affect are normal. 
ORTHOPEDIC EXAM of right shoulder: Inspection: swelling not present,  Bruising not present Incision, clean, dry, intact, sutures in place Passive glenohumeral abduction 0-45 degrees Stability: Stable Strength: n/a 
2+ distal pulses IMPRESSION:  S/P Right shoulder arthroscopic 2cm rotator cuff repair PLAN:  
Incisions cleaned. Surgery was discussed at length today. Patient to continue with PROM and PT. Continue wearing sling. Stressed to patient that nothing causes an increase in pain. RTC 3 weeks LU[facility]

## 2018-10-01 ENCOUNTER — HOSPITAL ENCOUNTER (OUTPATIENT)
Dept: PHYSICAL THERAPY | Age: 58
Discharge: HOME | End: 2018-10-01
Payer: COMMERCIAL

## 2018-10-01 PROCEDURE — 97110 THERAPEUTIC EXERCISES: CPT

## 2018-10-01 PROCEDURE — 97016 VASOPNEUMATIC DEVICE THERAPY: CPT

## 2018-10-01 PROCEDURE — 97140 MANUAL THERAPY 1/> REGIONS: CPT

## 2018-10-01 NOTE — PROGRESS NOTES
PT DAILY TREATMENT NOTE 10-18 Patient Name: Santo Nissen Date:10/1/2018 : 1960 [x]  Patient  Verified Payor: BLUE CROSS / Plan: [place] BLUE [place]OSS [employer]ERAL / Product Type: PPO / In time:503  Out time:538 Total Treatment Time (min): 35 Visit #: 4 of 8 Medicare/BCBS Only Total Timed Codes (min):  25 1:1 Treatment Time:  35 Treatment Area: Complete rotator cuff tear or rupture of right shoulder, not specified as traumatic [M75.121] SUBJECTIVE Pain Level (0-10 scale): 2 Any medication changes, allergies to medications, adverse drug reactions, diagnosis change, or new procedure performed?: [x] No    [] Yes (see summary sheet for update) Subjective functional status/changes:   [] No changes reported 
I'm doing great today. I went back to work and everything. Reviewed RTC precautions. Pt can recite them but doesn't practice them. 5X during treatment therapist has to instruct pt to stop abducting her arm and actively moving her elbow away from her body. OBJECTIVE 17 min Therapeutic Exercise:  [x] See flow sheet :  
Rationale: increase ROM to improve the patients ability to improve self management of pain 8 min Manual Therapy:  Per flow sheet Rationale: decrease pain, increase ROM, increase tissue extensibility and decrease trigger points to improve PROM in all planes Modality rationale: decrease edema and decrease pain to improve the patients ability to increase ease of ADLs Min Type Additional Details  
 [] Estim:  []Unatt       []IFC  []Premod []Other:  []w/ice   []w/heat Position: Location:  
 [] Estim: []Att    []TENS instruct  []NMES []Other:  []w/US   []w/ice   []w/heat Position: Location:  
 []  Traction: [] Cervical       []Lumbar 
                     [] Prone          []Supine []Intermittent   []Continuous Lbs: 
[] before manual 
[] after manual  
 []  Ultrasound: []Continuous   [] Pulsed []1MHz   []3MHz Location: 
W/cm2:  
 []  Iontophoresis with dexamethasone Location: [] Take home patch  
[] In clinic  
 []  Ice     []  heat 
[]  Ice massage 
[]  Laser  
[]  Anodyne Position: Location:  
 []  Laser with stim 
[]  Other: Position: Location:  
10 [x]  Vasopneumatic Device Pressure:       [x] lo [] med [] hi  
Temperature: [x] lo [] med [] hi  
[x] Skin assessment post-treatment:  [x]intact []redness- no adverse reaction 
  []redness  adverse reaction:  
 
       
With 
 [] TE 
 [] TA 
 [] neuro 
 [] other: Patient Education: [x] Review HEP [] Progressed/Changed HEP based on:  
[] positioning   [] body mechanics   [] transfers   [] heat/ice application   
[] other:   
 
Other Objective/Functional Measures: Added 2# wt to codmans to improve joint traction and quality of the pendulum motion. PROM: flexion 125, abduction 100, ER 65. Poor inferior capsular mobility RTC precautions: at one point pt was actively performing scaption to 50 degrees Pain Level (0-10 scale) post treatment: 2 
 
ASSESSMENT/Changes in Function: Pt does not understand RTC precautions. She did well during manual treatment this session with minimal to no muscle guarding noted. Patient will continue to benefit from skilled PT services to modify and progress therapeutic interventions, address functional mobility deficits, address ROM deficits, address strength deficits, analyze and address soft tissue restrictions, analyze and cue movement patterns, analyze and modify body mechanics/ergonomics and assess and modify postural abnormalities to attain remaining goals. []  See Plan of Care 
[]  See progress note/recertification 
[]  See Discharge Summary Progress towards goals / Updated goals: 
Short Term Goals: To be accomplished in 2 weeks: 
                       4. I and compliant with HEP for self management of symptoms. Met per patient report (9/24/2018) 2. Increase passive elevation to 90 degrees to increase ease with dressing.- MET 90 degrees 9-26-18 Long Term Goals: To be accomplished in 4 weeks: 
                       1. Improve FOTO to 72 to indicate improved function with daily activities. 2. Increase right passive elevation to 120 to increase ease with AAROM for ADLs. - progressing 90 degrees on 9-26-18 
3. Increase right passive ER to 45 degrees to increase ease with eating. Met - increased to 65 10/1/2018 PLAN 
[]  Upgrade activities as tolerated     [x]  Continue plan of care 
[]  Update interventions per flow sheet      
[]  Discharge due to:_ 
[]  Other:_ [address] [place], [place] 10/1/2018  6:13 PM 
 
Future Appointments Date Time Provider Bryson Maxwell 10/3/2018 5:30 PM Norma Rai, ABILIO [facility]  
10/8/2018 5:30 PM [address] [place] [address], DANIELLE [facility]  
10/10/2018 5:30 PM [facility] Rai[facility]  
10/16/2018 8:45 AM Jayleen Thompson, [address] [facility]

## 2018-10-03 ENCOUNTER — HOSPITAL ENCOUNTER (OUTPATIENT)
Dept: PHYSICAL THERAPY | Age: 58
Discharge: HOME | End: 2018-10-03
Payer: COMMERCIAL

## 2018-10-03 PROCEDURE — 97016 VASOPNEUMATIC DEVICE THERAPY: CPT

## 2018-10-03 PROCEDURE — 97140 MANUAL THERAPY 1/> REGIONS: CPT

## 2018-10-03 NOTE — PROGRESS NOTES
PT DAILY TREATMENT NOTE  Patient Name: Destiny Garcia Date:10/3/2018 : 1960 [x]  Patient  Verified Payor: BLUE CROSS / Plan: [place] [employer] / Product Type: PPO / In time:5:32  Out time:6:08 Total Treatment Time (min): 36 
1:1 Time: 16 minutes Visit #: 5 of 8 Treatment Area: Complete rotator cuff tear or rupture of right shoulder, not specified as traumatic [M75.121] SUBJECTIVE Pain Level (0-10 scale): 0/10 Any medication changes, allergies to medications, adverse drug reactions, diagnosis change, or new procedure performed?: [x] No    [] Yes (see summary sheet for update) Subjective functional status/changes:   [] No changes reported \"Feeling pretty good, no pain. \" OBJECTIVE Modality rationale: decrease inflammation and decrease pain to improve the patients ability to perform ADL's. Min Type Additional Details  
 [] Estim:  []Unatt       []IFC  []Premod []Other:  []w/ice   []w/heat Position: Location:  
 [] Estim: []Att    []TENS instruct  []NMES []Other:  []w/US   []w/ice   []w/heat Position: Location:  
 []  Traction: [] Cervical       []Lumbar 
                     [] Prone          []Supine []Intermittent   []Continuous Lbs: 
[] before manual 
[] after manual  
 []  Ultrasound: []Continuous   [] Pulsed []1MHz   []3MHz W/cm2: 
Location:  
 []  Iontophoresis with dexamethasone Location: [] Take home patch  
[] In clinic  
 []  Ice     []  heat 
[]  Ice massage 
[]  Laser  
[]  Anodyne Position: Location:  
 []  Laser with stim 
[]  Other:  Position: Location:  
10 [x]  Vasopneumatic Device Pressure:       [x] lo [] med [] hi  
Temperature: [x] lo [] med [] hi  
[] Skin assessment post-treatment:  []intact []redness- no adverse reaction 
  []redness  adverse reaction:  
 
18 min Therapeutic Exercise:  [x] See flow sheet :  
 Rationale: increase ROM and increase strength to improve the patients ability to perform ADL's and don/dof sling. 8 min Manual Therapy:  Right ST joint mobs, shoulder PROM, STM/DTM Right UT and lev scap. Rationale: decrease pain, increase ROM and increase tissue extensibility to prepare for AAROM phase. With 
 [x] TE 
 [] TA 
 [] neuro 
 [] other: Patient Education: [x] Review HEP [] Progressed/Changed HEP based on:  
[] positioning   [] body mechanics   [] transfers   [] heat/ice application   
[] other:   
 
Other Objective/Functional Measures:   
 
Pain Level (0-10 scale) post treatment: 0/10 ASSESSMENT/Changes in Function: FOTO 44%. Continues frequent cueing to decrease guarding with exercises, unable to fully correct. PROM progressing well in flexion and scaption. Patient will continue to benefit from skilled PT services to modify and progress therapeutic interventions, address functional mobility deficits, address ROM deficits, address strength deficits and analyze and address soft tissue restrictions to attain remaining goals. [x]  See Plan of Care 
[]  See progress note/recertification 
[]  See Discharge Summary Progress towards goals / Updated goals: 
Short Term Goals: To be accomplished in 2 weeks: 
                       4. I and compliant with HEP for self management of symptoms. Met per patient report (9/24/2018) 2. Increase passive elevation to 90 degrees to increase ease with dressing.- MET 90 degrees 9-26-18 Long Term Goals: To be accomplished in 4 weeks: 
                       1. Improve FOTO to 72 to indicate improved function with daily activities. - FOTO 42%. 10/3/2018 
2. Increase right passive elevation to 120 to increase ease with AAROM for ADLs.  - progressing 90 degrees on 9-26-18 
3. Increase right passive ER to 45 degrees to increase ease with eating. Met - increased to 65 10/1/2018 PLAN 
 []  Upgrade activities as tolerated     [x]  Continue plan of care 
[]  Update interventions per flow sheet      
[]  Discharge due to:_ 
[]  Other:_   
 
Nellie Fuller PTA 10/3/2018  5:29 PM 
 
Future Appointments Date Time Provider Bryson Maxwell 10/3/2018 5:30 PM [facility]  
10/8/2018 5:30 PM [facility]  
10/10/2018 5:30 PM [facility]  
10/16/2018 8:45 AM Mckinley Junior, [id number] [facility]

## 2018-10-08 ENCOUNTER — HOSPITAL ENCOUNTER (OUTPATIENT)
Dept: PHYSICAL THERAPY | Age: 58
Discharge: HOME | End: 2018-10-08
Payer: COMMERCIAL

## 2018-10-08 PROCEDURE — 97140 MANUAL THERAPY 1/> REGIONS: CPT

## 2018-10-08 PROCEDURE — 97110 THERAPEUTIC EXERCISES: CPT

## 2018-10-08 PROCEDURE — 97016 VASOPNEUMATIC DEVICE THERAPY: CPT

## 2018-10-08 NOTE — PROGRESS NOTES
PT DAILY TREATMENT NOTE 10-18 Patient Name: Daya Pinedo Date:10/8/2018 : 1960 [x]  Patient  Verified Payor: BLUE CROSS / Plan: [place] [employer] / Product Type: PPO / In time:526  Out time:602 Total Treatment Time (min): 36 Visit #: 6 of 8 Medicare/BCBS Only Total Timed Codes (min):  26 1:1 Treatment Time:  26  
 
 
Treatment Area: Complete rotator cuff tear or rupture of right shoulder, not specified as traumatic [M75.121] SUBJECTIVE Pain Level (0-10 scale): 4 Any medication changes, allergies to medications, adverse drug reactions, diagnosis change, or new procedure performed?: [x] No    [] Yes (see summary sheet for update) Subjective functional status/changes:   [] No changes reported I am not doing well and my treatment didn't go well last time. Pt reports having greater difficulty tolerating manual therapy. She reports increased periscapular pain. Therapist questioned her about excessive movement and RTC protection violations witness in subsequent visits. Pt insisted that she has been consistent with precautions this past week. OBJECTIVE 18 min Therapeutic Exercise:  [x] See flow sheet :  
Rationale: increase ROM and increase strength to improve the patients ability to increase ease of ADLs 8 min Manual Therapy:  Heavy emphasis on soft tissue work around the scapula Rationale: decrease pain, increase ROM, increase tissue extensibility and decrease trigger points to reduce pain Modality rationale: decrease edema, decrease inflammation and decrease pain to improve the patients ability to increase ease of ADLs Min Type Additional Details  
 [] Estim:  []Unatt       []IFC  []Premod []Other:  []w/ice   []w/heat Position: Location:  
 [] Estim: []Att    []TENS instruct  []NMES []Other:  []w/US   []w/ice   []w/heat Position: Location:  
 []  Traction: [] Cervical       []Lumbar [] Prone          []Supine []Intermittent   []Continuous Lbs: 
[] before manual 
[] after manual  
 []  Ultrasound: []Continuous   [] Pulsed []1MHz   []3MHz Location: 
W/cm2:  
 []  Iontophoresis with dexamethasone Location: [] Take home patch  
[] In clinic  
 []  Ice     []  heat 
[]  Ice massage 
[]  Laser  
[]  Anodyne Position: Location:  
 []  Laser with stim 
[]  Other: Position: Location:  
10 [x]  Vasopneumatic Device Pressure:       [x] lo [] med [] hi  
Temperature: [x] lo [] med [] hi  
[x] Skin assessment post-treatment:  [x]intact []redness- no adverse reaction 
  []redness  adverse reaction:  
 
       
With 
 [] TE 
 [] TA 
 [] neuro 
 [] other: Patient Education: [x] Review HEP [] Progressed/Changed HEP based on:  
[] positioning   [] body mechanics   [] transfers   [] heat/ice application   
[] other:   
 
Other Objective/Functional Measures: Pt has significant periscapular irritation which improved post manual approach. Therapist again did not detect muscle guarding during manual but did observe patient putting her repair at risk during bed mobility - including nearly reaching her arm behind her back before the therapist stopped her and again educated her about precautions. Pain Level (0-10 scale) post treatment: 2 
 
ASSESSMENT/Changes in Function: Pt was very preoccupied throughout session about when she would be allowed to start strengthening and functional activity. Patient will continue to benefit from skilled PT services to modify and progress therapeutic interventions, address functional mobility deficits, address ROM deficits, address strength deficits, analyze and address soft tissue restrictions, analyze and cue movement patterns, analyze and modify body mechanics/ergonomics and assess and modify postural abnormalities to attain remaining goals. []  See Plan of Care []  See progress note/recertification 
[]  See Discharge Summary Progress towards goals / Updated goals: 
Short Term Goals: To be accomplished in 2 weeks: 
                       3. I and compliant with HEP for self management of symptoms. Met per patient report (9/24/2018) 2. Increase passive elevation to 90 degrees to increase ease with dressing.- MET 90 degrees 9-26-18 Long Term Goals: To be accomplished in 4 weeks: 
                       1. Improve FOTO to 72 to indicate improved function with daily activities. - FOTO 42%. 10/3/2018 
2. Increase right passive elevation to 120 to increase ease with AAROM for ADLs.  - progressing 90 degrees on 9-26-18 
3. Increase right passive ER to 45 degrees to increase ease with eating.  Met - increased to 65 10/1/2018 PLAN 
[]  Upgrade activities as tolerated     [x]  Continue plan of care 
[]  Update interventions per flow sheet      
[]  Discharge due to:_ 
[]  Other:_ Rashi Huerta 10/8/2018  6:41 PM 
 
Future Appointments Date Time Provider Bryson Maxwell 10/10/2018 5:30 PM Rosalinda Kurtz PTA [facility]  
10/16/2018 8:45 AM MENA Soto [address]

## 2018-10-10 ENCOUNTER — APPOINTMENT (OUTPATIENT)
Dept: PHYSICAL THERAPY | Age: 58
End: 2018-10-10
Payer: COMMERCIAL

## 2018-10-16 ENCOUNTER — HOSPITAL ENCOUNTER (OUTPATIENT)
Dept: PHYSICAL THERAPY | Age: 58
Discharge: HOME | End: 2018-10-16
Payer: COMMERCIAL

## 2018-10-16 ENCOUNTER — OFFICE VISIT (OUTPATIENT)
Dept: ORTHOPEDIC SURGERY | Age: 58
End: 2018-10-16

## 2018-10-16 VITALS
TEMPERATURE: 97.1 F | WEIGHT: 163.8 LBS | BODY MASS INDEX: 27.29 KG/M2 | DIASTOLIC BLOOD PRESSURE: 59 MMHG | RESPIRATION RATE: 16 BRPM | OXYGEN SATURATION: 100 % | HEIGHT: 65 IN | HEART RATE: 77 BPM | SYSTOLIC BLOOD PRESSURE: 116 MMHG

## 2018-10-16 DIAGNOSIS — M75.121 COMPLETE TEAR OF RIGHT ROTATOR CUFF: Primary | ICD-10-CM

## 2018-10-16 PROCEDURE — 97016 VASOPNEUMATIC DEVICE THERAPY: CPT

## 2018-10-16 PROCEDURE — 97140 MANUAL THERAPY 1/> REGIONS: CPT

## 2018-10-16 PROCEDURE — 97110 THERAPEUTIC EXERCISES: CPT

## 2018-10-16 RX ORDER — HYDROCODONE BITARTRATE AND ACETAMINOPHEN 5; 325 MG/1; MG/1
1 TABLET ORAL
Qty: 30 TAB | Refills: 0 | Status: SHIPPED | OUTPATIENT
Start: 2018-10-16 | End: 2018-11-04

## 2018-10-16 NOTE — PROGRESS NOTES
Carlee Felipe 1960 HISTORY OF PRESENT ILLNESS Carlee Felipe is a 59 y.o. female who presents today for evaluation s/p Right shoulder arthroscopic 2cm rotator cuff repair on 9/20/18. Patient has been going to PT. Describes pain as a 2/10. She states she hasnt been in PT x 1 weeks because they were short staffed. She feels like her pain has improved. Patient denies any fever, chills, chest pain, shortness of breath or calf pain. The remainder of the review of systems is negative. There are no new illness or injuries to report since last seen in the office. No changes in medications, allergies, social or family history. PHYSICAL EXAM:  
Visit Vitals  /59  Pulse 77  Temp 97.1 °F (36.2 °C) (Oral)  Resp 16  
 Ht 5' 5\" (1.651 m)  Wt 163 lb 12.8 oz (74.3 kg)  SpO2 100%  BMI 27.26 kg/m2 The patient is a well-developed, well-nourished female in no acute distress. The patient is alert and oriented times three. The patient appears to be well groomed. Mood and affect are normal. 
ORTHOPEDIC EXAM of right shoulder: Inspection: swelling not present,  Bruising not present Incisions well healed Passive glenohumeral abduction 0-90 degrees Stability: Stable Strength: n/a 
2+ distal pulses IMPRESSION:  S/P Right shoulder arthroscopic 2cm rotator cuff repair PLAN:  
1. Patient doing well post operatively 2. Will cont with PT. Active assist now, arom in 1 week. D/c sling in 1 week 3. Stressed no lifting RTC 4 weeks [phone]

## 2018-10-16 NOTE — PROGRESS NOTES
PT DAILY TREATMENT NOTE - [facility]  Patient Name: Cleotis Cabot Date:10/16/2018 : 1960 [x]  Patient  Verified Payor: BLUE CROSS / Plan: [place] / Product Type: PPO / In time:930  Out time:1012 Total Treatment Time (min): 42 Total Timed Codes (min): 32 
1:1 Treatment Time ( only): 30 Visit #: 7 of 8 Treatment Area: Complete rotator cuff tear or rupture of right shoulder, not specified as traumatic [M75.121] SUBJECTIVE Pain Level (0-10 scale): 2 Any medication changes, allergies to medications, adverse drug reactions, diagnosis change, or new procedure performed?: [x] No    [] Yes (see summary sheet for update) Subjective functional status/changes:   [x] No changes reported OBJECTIVE Modality rationale: decrease pain to improve the patients ability to tolerate post exercise soreness Min Type Additional Details  
 [] Estim:  []Unatt       []IFC  []Premod []Other:  []w/ice   []w/heat Position: Location:  
 [] Estim: []Att    []TENS instruct  []NMES []Other:  []w/US   []w/ice   []w/heat Position: Location:  
 []  Traction: [] Cervical       []Lumbar 
                     [] Prone          []Supine []Intermittent   []Continuous Lbs: 
[] before manual 
[] after manual  
 []  Ultrasound: []Continuous   [] Pulsed []1MHz   []3MHz W/cm2: 
Location:  
 []  Iontophoresis with dexamethasone Location: [] Take home patch  
[] In clinic  
 []  Ice     []  heat 
[]  Ice massage 
[]  Laser  
[]  Anodyne Position: Location:  
 []  Laser with stim 
[]  Other:  Position: Location:  
10 [x]  Vasopneumatic Device Pressure:       [x] lo [] med [] hi  
Temperature: [x] lo [] med [] hi  
[] Skin assessment post-treatment:  []intact []redness- no adverse reaction 
  []redness  adverse reaction:  
 
 
22 min Therapeutic Exercise:  [] See flow sheet :  
 Rationale: increase ROM and increase strength to improve the patients ability to perform daily activities 10 min Manual Therapy:  Per flow sheet Rationale: decrease pain and increase ROM to increase ease with ADLs With 
 [] TE 
 [] TA 
 [] neuro 
 [] other: Patient Education: [x] Review HEP [] Progressed/Changed HEP based on:  
[] positioning   [] body mechanics   [] transfers   [] heat/ice application   
[] other:   
 
Other Objective/Functional Measures:   
 
Pain Level (0-10 scale) post treatment: 2 
 
ASSESSMENT/Changes in Function: Constant cueing to stop guarding with manual.  Added AAROM to program and HEP. Patient will continue to benefit from skilled PT services to modify and progress therapeutic interventions, address functional mobility deficits, address ROM deficits, address strength deficits, analyze and address soft tissue restrictions and analyze and cue movement patterns to attain remaining goals. []  See Plan of Care [x]  See progress note/recertification 
[]  See Discharge Summary Progress towards goals / Updated goals: 
Short Term Goals: To be accomplished in 2 weeks: 
                       6. I and compliant with HEP for self management of symptoms. Met per patient report (9/24/2018) 2. Increase passive elevation to 90 degrees to increase ease with dressing.- MET 90 degrees 9-26-18 Long Term Goals: To be accomplished in 4 weeks: 
                       1. Improve FOTO to 72 to indicate improved function with daily activities. - FOTO 42%. 10/3/2018 
2. Increase right passive elevation to 120 to increase ease with AAROM for ADLs.  - progressing 90 degrees on 9-26-18; 110 10/16/18 
3. Increase right passive ER to 45 degrees to increase ease with eating.  Met - increased to 65 10/1/2018 
  
 
PLAN [x]  Upgrade activities as tolerated     [x]  Continue plan of care 
[]  Update interventions per flow sheet      
[]  Discharge due to:_ 
[]  Other:_   
 
 Vaishali Colby, PT 10/16/2018  9:41 AM 
 
Future Appointments Date Time Provider Bryson Maxwell 11/15/2018 9:00 AM Render MENA Souza [address]

## 2018-10-16 NOTE — PROGRESS NOTES
In [address]  [address] [place], [address] 
[phone] fax Physical Therapy Progress Note Patient name: Key Catalan Start of Care: 18 Referral source: Esperanza Trejojohana,* : 1960 Medical/Treatment Diagnosis: Complete rotator cuff tear or rupture of right shoulder, not specified as traumatic [M75.121] Onset Date:18 Prior Hospitalization: see medical history Provider#: [id number] Medications: Verified on Patient Summary List   
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 
   
[facility]: 7    Missed Visits: 0 Established Goals:        Excellent         Good         Limited            None 
[x] Increased ROM   []  [x]  []  [] 
[x] Increased Strength  []  []  [x]  [] 
[x] Increased Mobility  []  [x]  []  []  
[x] Decreased Pain   []  [x]  []  [] 
[] Decreased Swelling  []  []  []  [] 
 
Key Functional Changes: FOTO 42; passive elevation 110; passive ER 65 Updated Goals: to be achieved in 5 weeks: 
1. Increase right sh AAROM elevation to >150 degrees to increase ease with ADLs. 2. Increase right sh strength to grossly 3/5 to increase ease with daily activities. 3. Increase FOTO to 72 to indicate improved function with daily activiites. 4. Increase right CALVIN to occiput to increase ease with grooming. 5. Increase right FIR to L5 to increase ease with dressing. ASSESSMENT/RECOMMENDATIONS: 
[x]Continue therapy per initial plan/protocol at a frequency of  2 x per week for 4 weeks []Continue therapy with the following recommended changes:_____________________      _____________________________________________________________________ []Discontinue therapy progressing towards or have reached established goals []Discontinue therapy due to lack of appreciable progress towards goals []Discontinue therapy due to lack of attendance or compliance []Await Physician's recommendations/decisions regarding therapy []Other:________________________________________________________________ Thank you for this referral.   
Karin Daley, PT 10/16/2018 11:18 AM 
NOTE TO PHYSICIAN:  PLEASE COMPLETE THE ORDERS BELOW AND  
FAX TO [facility] Physical Therapy: [phone] If you are unable to process this request in 24 hours please contact our office: [phone] ? I have read the above report and request that my patient continue as recommended. ? I have read the above report and request that my patient continue therapy with the following changes/special instructions:__________________________________________________________ ? I have read the above report and request that my patient be discharged from therapy. Physicians signature: ______________________________Date: ______Time:______

## 2018-10-22 ENCOUNTER — HOSPITAL ENCOUNTER (OUTPATIENT)
Dept: PHYSICAL THERAPY | Age: 58
Discharge: HOME | End: 2018-10-22
Payer: COMMERCIAL

## 2018-10-22 PROCEDURE — 97140 MANUAL THERAPY 1/> REGIONS: CPT

## 2018-10-22 PROCEDURE — 97110 THERAPEUTIC EXERCISES: CPT

## 2018-10-22 NOTE — PROGRESS NOTES
PT DAILY TREATMENT NOTE 10-18 Patient Name: Steph Dacosta Date:10/22/2018 : 1960 [x]  Patient  Verified Payor: BLUE CROSS / Plan: [place] BLUE [place]OSS [employer]ERAL / Product Type: PPO / In time:6:00  Out time:6:33 Total Treatment Time (min): 33 Visit #: 1 of 8 Medicare/BCBS Only Total Timed Codes (min):  23 1:1 Treatment Time:  21 Treatment Area: Complete rotator cuff tear or rupture of right shoulder, not specified as traumatic [M75.121] SUBJECTIVE Pain Level (0-10 scale): 2/10 Any medication changes, allergies to medications, adverse drug reactions, diagnosis change, or new procedure performed?: [x] No    [] Yes (see summary sheet for update) Subjective functional status/changes:   [] No changes reported Pt reports no new complaints of pain. Pt reports she feels soreness in her shoulder. OBJECTIVE Modality rationale: decrease inflammation and decrease pain to improve the patients ability to tolerate ADLs. Min Type Additional Details  
 [] Estim:  []Unatt       []IFC  []Premod []Other:  []w/ice   []w/heat Position: Location:  
 [] Estim: []Att    []TENS instruct  []NMES []Other:  []w/US   []w/ice   []w/heat Position: Location:  
 []  Traction: [] Cervical       []Lumbar 
                     [] Prone          []Supine []Intermittent   []Continuous Lbs: 
[] before manual 
[] after manual  
 []  Ultrasound: []Continuous   [] Pulsed []1MHz   []3MHz W/cm2: 
Location:  
 []  Iontophoresis with dexamethasone Location: [] Take home patch  
[] In clinic  
 []  Ice     []  heat 
[]  Ice massage 
[]  Laser  
[]  Anodyne Position: Location:  
 []  Laser with stim 
[]  Other:  Position: Location:  
10 [x]  Vasopneumatic Device Pressure:       [x] lo [] med [] hi  
Temperature: [x] lo [] med [] hi  
[] Skin assessment post-treatment:  []intact []redness- no adverse reaction []redness  adverse reaction:  
 
13 min Therapeutic Exercise:  [x] See flow sheet :  
Rationale: increase ROM and increase strength to improve the patients ability to tolerate ADLs. 10 min Manual Therapy:  PROM to right shoulder Rationale: decrease pain, increase ROM and increase tissue extensibility to improve tolerance to ADLs. With 
 [] TE 
 [] TA 
 [] neuro 
 [] other: Patient Education: [x] Review HEP [] Progressed/Changed HEP based on:  
[] positioning   [] body mechanics   [] transfers   [] heat/ice application   
[] other:   
 
Other Objective/Functional Measures: Pt frequently guard with manual treatment and therapeutic exercises. Pain Level (0-10 scale) post treatment: 1/10 ASSESSMENT/Changes in Function: Pt able to reduce guarding with vc's and relaxation techniques during manual treatment. Patient will continue to benefit from skilled PT services to modify and progress therapeutic interventions, address functional mobility deficits, address ROM deficits, address strength deficits, analyze and address soft tissue restrictions, analyze and cue movement patterns and analyze and modify body mechanics/ergonomics to attain remaining goals. []  See Plan of Care 
[]  See progress note/recertification 
[]  See Discharge Summary Progress towards goals / Updated goals: 
 
Updated Goals: to be achieved in 5 weeks: 
1. Increase right sh AAROM elevation to >150 degrees to increase ease with ADLs. 2. Increase right sh strength to grossly 3/5 to increase ease with daily activities. 3. Increase FOTO to 72 to indicate improved function with daily activiites. 4. Increase right CALVIN to occiput to increase ease with grooming.  
5. Increase right FIR to L5 to increase ease with dressing.  
  
 
 
 
PLAN 
[]  Upgrade activities as tolerated     [x]  Continue plan of care 
[]  Update interventions per flow sheet      
[]  Discharge due to:_ 
[]  Other:_   
 
 Brittany Dejesus, ABILIO 10/22/2018  6:08 PM 
 
Future Appointments Date Time Provider Bryson Cappsi 10/24/2018  6:00 PM Rachel Ulloa, [facility]  
10/30/2018  6:00 PM Rachel Ulloa, [place] [facility]  
11/2/2018  6:00 PM Lilia Montgomery [facility]  
11/5/2018  5:30 PM Shalini Christie, PT [facility]  
11/7/2018  5:30 PM Rachel Ulloa, [facility]  
11/12/2018  5:30 PM Shalini Christie, PT [facility]  
11/14/2018  5:30 PM Rachel Ulloa, [facility]  
11/15/2018  9:00 AM Laura Shukla PA [address]

## 2018-10-24 ENCOUNTER — HOSPITAL ENCOUNTER (OUTPATIENT)
Dept: PHYSICAL THERAPY | Age: 58
Discharge: HOME | End: 2018-10-24
Payer: COMMERCIAL

## 2018-10-24 PROCEDURE — 97140 MANUAL THERAPY 1/> REGIONS: CPT

## 2018-10-24 NOTE — PROGRESS NOTES
PT DAILY TREATMENT NOTE 10-18 Patient Name: Nadja Marley Date:10/24/2018 : 1960 [x]  Patient  Verified Payor: BLUE CROSS / Plan: [place] [employer] / Product Type: PPO / In time:6:00  Out time:6:43 Total Treatment Time (min): 43 Visit #: 2 of 8 Medicare/BCBS Only Total Timed Codes (min):  43 1:1 Treatment Time:  18 Treatment Area: Complete rotator cuff tear or rupture of right shoulder, not specified as traumatic [M75.121] SUBJECTIVE Pain Level (0-10 scale): 4/10 Any medication changes, allergies to medications, adverse drug reactions, diagnosis change, or new procedure performed?: [x] No    [] Yes (see summary sheet for update) Subjective functional status/changes:   [] No changes reported \"A little sore on the front of the arm. \" OBJECTIVE 35 min Therapeutic Exercise:  [x] See flow sheet :  
Rationale: increase ROM and increase strength to improve the patients ability to perform ADL's. 
 
8 min Manual Therapy:  Right ST/GH joint mobs, STM/DTM Right UT, lev scap, bicep. Shoulder PROM. Rationale: decrease pain, increase ROM, increase tissue extensibility and decrease trigger points to further improve ease of performing AAROM. With 
 [x] TE 
 [] TA 
 [] neuro 
 [] other: Patient Education: [x] Review HEP [] Progressed/Changed HEP based on:  
[] positioning   [] body mechanics   [] transfers   [] heat/ice application   
[] other:   
 
Other Objective/Functional Measures: No change in there ex. Pain Level (0-10 scale) post treatment: 1/10 ASSESSMENT/Changes in Function: cueing to decrease guarding during manual and scapular elevation with pulleys. Decreased pain level post-treatment.   
 
Patient will continue to benefit from skilled PT services to modify and progress therapeutic interventions, address functional mobility deficits, address ROM deficits, address strength deficits and analyze and address soft tissue restrictions to attain remaining goals. [x]  See Plan of Care 
[]  See progress note/recertification 
[]  See Discharge Summary Progress towards goals / Updated goals: 
Updated Goals: to be achieved in 5 weeks: 
1. Increase right sh AAROM elevation to >150 degrees to increase ease with ADLs. 2. Increase right sh strength to grossly 3/5 to increase ease with daily activities. 3. Increase FOTO to 72 to indicate improved function with daily activiites. 4. Increase right CALVIN to occiput to increase ease with grooming. 5. Increase right FIR to L5 to increase ease with dressing.  PLAN 
[]  Upgrade activities as tolerated     [x]  Continue plan of care 
[]  Update interventions per flow sheet      
[]  Discharge due to:_ 
[]  Other:_   
 
Angeline Garcia PTA 10/24/2018  6:00 PM 
 
Future Appointments Date Time Provider Bryson Maxwell 10/30/2018  6:00 PM Bekah Retort, PTA [facility]  
11/2/2018  4:30 PM [facility]  
11/5/2018  5:30 PM Amalia , PT [facility]  
11/7/2018  5:30 PM Bekah Retort, PTA [facility]  
11/12/2018  5:30 PM Amalia , PT [facility]  
11/14/2018  5:30 PM Bekah Retort, PTA [facility]  
11/15/2018  9:00 AM MENA Merino 67

## 2018-10-30 ENCOUNTER — HOSPITAL ENCOUNTER (OUTPATIENT)
Dept: PHYSICAL THERAPY | Age: 58
Discharge: HOME | End: 2018-10-30
Payer: COMMERCIAL

## 2018-10-30 PROCEDURE — 97110 THERAPEUTIC EXERCISES: CPT

## 2018-10-30 PROCEDURE — 97140 MANUAL THERAPY 1/> REGIONS: CPT

## 2018-11-02 ENCOUNTER — HOSPITAL ENCOUNTER (OUTPATIENT)
Dept: PHYSICAL THERAPY | Age: 58
Discharge: HOME | End: 2018-11-02
Payer: COMMERCIAL

## 2018-11-02 PROCEDURE — 97140 MANUAL THERAPY 1/> REGIONS: CPT

## 2018-11-02 PROCEDURE — 97110 THERAPEUTIC EXERCISES: CPT

## 2018-11-02 NOTE — PROGRESS NOTES
PT DAILY TREATMENT NOTE 10-18 Patient Name: Ninfa Lara Date:2018 : 1960 [x]  Patient  Verified Payor: BLUE CROSS / Plan: [place] [employer] / Product Type: PPO / In time:4:30  Out time:5:00 Total Treatment Time (min): 30 Visit #: 4 of 8 Medicare/BCBS Only Total Timed Codes (min):  30 1:1 Treatment Time:  21 Treatment Area: Complete rotator cuff tear or rupture of right shoulder, not specified as traumatic [M75.121] SUBJECTIVE Pain Level (0-10 scale): 0 Any medication changes, allergies to medications, adverse drug reactions, diagnosis change, or new procedure performed?: [x] No    [] Yes (see summary sheet for update) Subjective functional status/changes:   [] No changes reported \"It feels a little stiff, like someone 'socked' it. \" OBJECTIVE Modality rationale: PD  
Type Additional Details  
[] Estim:  []Unatt       []IFC  []Premod []Other:  []w/ice   []w/heat Position: Location:  
[] Estim: []Att    []TENS instruct  []NMES []Other:  []w/US   []w/ice   []w/heat Position: Location:  
[]  Traction: [] Cervical       []Lumbar 
                     [] Prone          []Supine []Intermittent   []Continuous Lbs: 
[] before manual 
[] after manual  
[]  Ultrasound: []Continuous   [] Pulsed []1MHz   []3MHz W/cm2: 
Location:  
[]  Iontophoresis with dexamethasone Location: [] Take home patch  
[] In clinic  
[]  Ice     []  heat 
[]  Ice massage 
[]  Laser  
[]  Anodyne Position: Location:  
[]  Laser with stim 
[]  Other:  Position: Location:  
[]  Vasopneumatic Device Pressure:       [] lo [] med [] hi  
Temperature: [] lo [] med [] hi  
[] Skin assessment post-treatment:  []intact []redness- no adverse reaction 
  []redness  adverse reaction:  
 
22 min Therapeutic Exercise:  [x] See flow sheet :  
 Rationale: increase ROM, increase strength and improve coordination to progress per protocol 8 min Manual Therapy:  Right scap mobs, PROM right shoulder Rationale: decrease pain, increase ROM and increase tissue extensibility to progress per protocol With 
 [] TE 
 [] TA 
 [] neuro 
 [] other: Patient Education: [x] Review HEP [] Progressed/Changed HEP based on:  
[] positioning   [] body mechanics   [] transfers   [] heat/ice application   
[] other:   
 
Other Objective/Functional Measures: Added finger ladder flex/scap reps Pain Level (0-10 scale) post treatment: 0 
 
ASSESSMENT/Changes in Function:  
Educated patient in regards to protocol and how she is currently in AAROM phase. ROM improving nicely and she does well with added activities. Patient will continue to benefit from skilled PT services to modify and progress therapeutic interventions, address functional mobility deficits, address ROM deficits, address strength deficits, analyze and address soft tissue restrictions, analyze and cue movement patterns, analyze and modify body mechanics/ergonomics and assess and modify postural abnormalities to attain remaining goals. []  See Plan of Care 
[]  See progress note/recertification 
[]  See Discharge Summary Progress towards goals / Updated goals: 
Updated Goals: to be achieved in 5 weeks: 
1. Increase right sh AAROM elevation to >150 degrees to increase ease with ADLs. 2. Increase right sh strength to grossly 3/5 to increase ease with daily activities. 3. Increase FOTO to 72 to indicate improved function with daily activiites. 4. Increase right CALVIN to occiput to increase ease with grooming.  
5. Increase right FIR to L5 to increase ease with dressing.   
  
 
 
 
PLAN 
[]  Upgrade activities as tolerated     [x]  Continue plan of care 
[]  Update interventions per flow sheet      
[]  Discharge due to:_ 
[]  Other:_   
 
Rubens Bob 11/2/2018  4:30 PM 
 
 Future Appointments Date Time Provider Bryson Alissa 11/5/2018  5:30 PM Ward Vogel, PT [facility]  
11/7/2018  5:30 PM Dane Fuchs, ABILIO [facility]  
11/12/2018  5:30 PM Ward Vogel, PT [facility]  
11/14/2018  5:30 PM Dane Fuchs, ABILIO [facility]  
11/15/2018  9:00 AM MENA Dorsey Jonathan 67

## 2018-11-04 ENCOUNTER — APPOINTMENT (OUTPATIENT)
Dept: CT IMAGING | Age: 58
DRG: 494 | End: 2018-11-04
Attending: PHYSICIAN ASSISTANT
Payer: COMMERCIAL

## 2018-11-04 ENCOUNTER — HOSPITAL ENCOUNTER (INPATIENT)
Age: 58
LOS: 3 days | Discharge: HOME HEALTH | DRG: 494 | End: 2018-11-07
Attending: EMERGENCY MEDICINE | Admitting: ORTHOPAEDIC SURGERY
Payer: COMMERCIAL

## 2018-11-04 ENCOUNTER — APPOINTMENT (OUTPATIENT)
Dept: GENERAL RADIOLOGY | Age: 58
DRG: 494 | End: 2018-11-04
Attending: PHYSICIAN ASSISTANT
Payer: COMMERCIAL

## 2018-11-04 DIAGNOSIS — S42.291A HUMERAL HEAD FRACTURE, RIGHT, CLOSED, INITIAL ENCOUNTER: Primary | ICD-10-CM

## 2018-11-04 DIAGNOSIS — M25.511 ACUTE PAIN OF RIGHT SHOULDER: ICD-10-CM
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
## 2018-11-04 PROBLEM — S42.291B: Status: ACTIVE | Noted: 2018-11-04

## 2018-11-04 LAB
ANION GAP SERPL CALC-SCNC: 13 MMOL/L (ref 3–18)
BASOPHILS # BLD: 0 K/UL (ref 0–0.1)
BASOPHILS NFR BLD: 0 % (ref 0–2)
BUN SERPL-MCNC: 15 MG/DL (ref 7–18)
BUN/CREAT SERPL: 20 (ref 12–20)
CALCIUM SERPL-MCNC: 8.9 MG/DL (ref 8.5–10.1)
CHLORIDE SERPL-SCNC: 103 MMOL/L (ref 100–108)
CO2 SERPL-SCNC: 24 MMOL/L (ref 21–32)
CREAT SERPL-MCNC: 0.74 MG/DL (ref 0.6–1.3)
DIFFERENTIAL METHOD BLD: ABNORMAL
EOSINOPHIL # BLD: 0.1 K/UL (ref 0–0.4)
EOSINOPHIL NFR BLD: 1 % (ref 0–5)
ERYTHROCYTE [DISTWIDTH] IN BLOOD BY AUTOMATED COUNT: 17 % (ref 11.6–14.5)
GLUCOSE SERPL-MCNC: 101 MG/DL (ref 74–99)
HCT VFR BLD AUTO: 32.5 % (ref 35–45)
HGB BLD-MCNC: 9.9 G/DL (ref 12–16)
INR PPP: 1 (ref 0.8–1.2)
LYMPHOCYTES # BLD: 3.1 K/UL (ref 0.9–3.6)
LYMPHOCYTES NFR BLD: 24 % (ref 21–52)
MCH RBC QN AUTO: 23.9 PG (ref 24–34)
MCHC RBC AUTO-ENTMCNC: 30.5 G/DL (ref 31–37)
MCV RBC AUTO: 78.5 FL (ref 74–97)
MONOCYTES # BLD: 1.1 K/UL (ref 0.05–1.2)
MONOCYTES NFR BLD: 9 % (ref 3–10)
NEUTS SEG # BLD: 8.8 K/UL (ref 1.8–8)
NEUTS SEG NFR BLD: 66 % (ref 40–73)
PLATELET # BLD AUTO: 405 K/UL (ref 135–420)
PMV BLD AUTO: 11.4 FL (ref 9.2–11.8)
POTASSIUM SERPL-SCNC: 4.8 MMOL/L (ref 3.5–5.5)
PROTHROMBIN TIME: 12.5 SEC (ref 11.5–15.2)
RBC # BLD AUTO: 4.14 M/UL (ref 4.2–5.3)
SODIUM SERPL-SCNC: 140 MMOL/L (ref 136–145)
WBC # BLD AUTO: 13.2 K/UL (ref 4.6–13.2)

## 2018-11-04 PROCEDURE — 93005 ELECTROCARDIOGRAM TRACING: CPT

## 2018-11-04 PROCEDURE — 99283 EMERGENCY DEPT VISIT LOW MDM: CPT

## 2018-11-04 PROCEDURE — 65270000029 HC RM PRIVATE

## 2018-11-04 PROCEDURE — 74011250636 HC RX REV CODE- 250/636: Performed by: PHYSICIAN ASSISTANT

## 2018-11-04 PROCEDURE — 96372 THER/PROPH/DIAG INJ SC/IM: CPT

## 2018-11-04 PROCEDURE — 85025 COMPLETE CBC W/AUTO DIFF WBC: CPT | Performed by: PHYSICIAN ASSISTANT

## 2018-11-04 PROCEDURE — 73030 X-RAY EXAM OF SHOULDER: CPT

## 2018-11-04 PROCEDURE — 85610 PROTHROMBIN TIME: CPT | Performed by: PHYSICIAN ASSISTANT

## 2018-11-04 PROCEDURE — 80048 BASIC METABOLIC PNL TOTAL CA: CPT | Performed by: PHYSICIAN ASSISTANT

## 2018-11-04 PROCEDURE — 74011250636 HC RX REV CODE- 250/636: Performed by: ORTHOPAEDIC SURGERY

## 2018-11-04 PROCEDURE — 96374 THER/PROPH/DIAG INJ IV PUSH: CPT

## 2018-11-04 PROCEDURE — 73200 CT UPPER EXTREMITY W/O DYE: CPT

## 2018-11-04 RX ORDER — ONDANSETRON 2 MG/ML
4-8 INJECTION INTRAMUSCULAR; INTRAVENOUS
Status: DISCONTINUED | OUTPATIENT
Start: 2018-11-04 | End: 2018-11-07 | Stop reason: HOSPADM

## 2018-11-04 RX ORDER — ONDANSETRON 2 MG/ML
4 INJECTION INTRAMUSCULAR; INTRAVENOUS
Status: COMPLETED | OUTPATIENT
Start: 2018-11-04 | End: 2018-11-04

## 2018-11-04 RX ORDER — MORPHINE SULFATE 4 MG/ML
4 INJECTION INTRAVENOUS
Status: COMPLETED | OUTPATIENT
Start: 2018-11-04 | End: 2018-11-04

## 2018-11-04 RX ORDER — HYDROMORPHONE HYDROCHLORIDE 2 MG/ML
1-2 INJECTION, SOLUTION INTRAMUSCULAR; INTRAVENOUS; SUBCUTANEOUS
Status: DISCONTINUED | OUTPATIENT
Start: 2018-11-04 | End: 2018-11-07 | Stop reason: HOSPADM

## 2018-11-04 RX ORDER — HYDROMORPHONE HYDROCHLORIDE 2 MG/ML
1 INJECTION, SOLUTION INTRAMUSCULAR; INTRAVENOUS; SUBCUTANEOUS ONCE
Status: COMPLETED | OUTPATIENT
Start: 2018-11-04 | End: 2018-11-04

## 2018-11-04 RX ORDER — HYDROMORPHONE HYDROCHLORIDE 1 MG/ML
0.5 INJECTION, SOLUTION INTRAMUSCULAR; INTRAVENOUS; SUBCUTANEOUS
Status: ACTIVE | OUTPATIENT
Start: 2018-11-04 | End: 2018-11-05

## 2018-11-04 RX ORDER — HYDROMORPHONE HYDROCHLORIDE 1 MG/ML
0.5 INJECTION, SOLUTION INTRAMUSCULAR; INTRAVENOUS; SUBCUTANEOUS
Status: COMPLETED | OUTPATIENT
Start: 2018-11-04 | End: 2018-11-04

## 2018-11-04 RX ORDER — SODIUM CHLORIDE 9 MG/ML
1000 INJECTION, SOLUTION INTRAVENOUS ONCE
Status: COMPLETED | OUTPATIENT
Start: 2018-11-04 | End: 2018-11-04

## 2018-11-04 RX ORDER — FAMOTIDINE 10 MG/ML
20 INJECTION INTRAVENOUS
Status: COMPLETED | OUTPATIENT
Start: 2018-11-04 | End: 2018-11-04

## 2018-11-04 RX ADMIN — HYDROMORPHONE HYDROCHLORIDE 1 MG: 2 INJECTION INTRAMUSCULAR; INTRAVENOUS; SUBCUTANEOUS at 18:37

## 2018-11-04 RX ADMIN — SODIUM CHLORIDE 1000 ML: 900 INJECTION, SOLUTION INTRAVENOUS at 18:45

## 2018-11-04 RX ADMIN — HYDROMORPHONE HYDROCHLORIDE 0.5 MG: 1 INJECTION, SOLUTION INTRAMUSCULAR; INTRAVENOUS; SUBCUTANEOUS at 19:06

## 2018-11-04 RX ADMIN — FAMOTIDINE 20 MG: 10 INJECTION, SOLUTION INTRAVENOUS at 19:00

## 2018-11-04 RX ADMIN — HYDROMORPHONE HYDROCHLORIDE 1 MG: 2 INJECTION INTRAMUSCULAR; INTRAVENOUS; SUBCUTANEOUS at 20:08

## 2018-11-04 RX ADMIN — HYDROMORPHONE HYDROCHLORIDE 1 MG: 2 INJECTION INTRAMUSCULAR; INTRAVENOUS; SUBCUTANEOUS at 22:02

## 2018-11-04 RX ADMIN — MORPHINE SULFATE 4 MG: 4 INJECTION INTRAVENOUS at 17:30

## 2018-11-04 RX ADMIN — ONDANSETRON 4 MG: 2 INJECTION INTRAMUSCULAR; INTRAVENOUS at 19:00

## 2018-11-04 RX ADMIN — SODIUM CHLORIDE 100 ML/HR: 900 INJECTION, SOLUTION INTRAVENOUS at 19:11

## 2018-11-04 NOTE — ED TRIAGE NOTES
Pt states fell down steps earlier and landed on her right shoulder. Had surgery on that same shoulder 6 weeks ago

## 2018-11-04 NOTE — ED PROVIDER NOTES
EMERGENCY DEPARTMENT HISTORY AND PHYSICAL EXAM 
 
Date: 11/4/2018 Patient Name: Mattie Grimaldo History of Presenting Illness Chief Complaint Patient presents with  Shoulder Injury History Provided By: Patient and Patient's  Chief Complaint: shoulder pain Duration: just prior to arrival 
Timing:  Acute Location: right shoulder Quality: Aching Severity: 10 out of 10 Modifying Factors: recent rotator cuff repair by Dr. Chente Juarez 6 weeks ago Associated Symptoms: denies any other injuries, headache, LOC, chest pain, SOB, NV Additional History (Context): Mattie Grimaldo is a 59 y.o. female with recent right rotator cuff repair, gastric bypass, bells' palsy who presents with C/O right shoulder pain that began just PTA. States she was putting some halloween decorations away when she fell down several stairs. She has not been able to lift the arm since. She had recent rotator cuff repair by Dr. Chente Juarez 6 weeks ago and had been doing very well. She denies any other injuries. She is left hand dominant. PCP: Terri Locke MD 
 
Current Facility-Administered Medications Medication Dose Route Frequency Provider Last Rate Last Dose  sodium chloride 0.9 % bolus infusion  100 mL/hr IntraVENous CONTINUOUS Abran Pelaez  mL/hr at 11/04/18 1911 100 mL/hr at 11/04/18 1911  
 HYDROmorphone (DILAUDID) injection 1-2 mg  1-2 mg IntraVENous Q2H PRN Abran Pelaez MD   1 mg at 11/04/18 2008  ondansetron (ZOFRAN) injection 4-8 mg  4-8 mg IntraVENous Q8H PRN Abran Pelaez MD      
 HYDROmorphone (DILAUDID) syringe 0.5 mg  0.5 mg IntraVENous NOW Aretharosangela Belen, [address] [place] [address] Jennifer Current Outpatient Medications Medication Sig Dispense Refill  multivitamin (ONE A DAY) tablet Take 1 Tab by mouth daily.  cyanocobalamin (VITAMIN B-12) 1,000 mcg tablet Take 1,000 mcg by mouth daily.  acetaminophen (TYLENOL EXTRA STRENGTH) 500 mg tablet Take  by mouth every six (6) hours as needed for Pain.  ibuprofen (MOTRIN) 400 mg tablet Take  by mouth every six (6) hours as needed for Pain.  zolpidem (AMBIEN) 5 mg tablet Take  by mouth nightly as needed for Sleep. Past History Past Medical History: 
Past Medical History:  
Diagnosis Date  Bell's palsy  Cardiac nuclear imaging test, normal 07/13/2012 Low risk. No ischemia or prior infarction. No RWMA. EF 64%. Nondiagnostic EKG on EST. Ex time 10 min 41 sec.  Lump Palm of the left hand Past Surgical History: 
Past Surgical History:  
Procedure Laterality Date  HAND/FINGER SURGERY UNLISTED Left   
 cyst removal  
 HX APPENDECTOMY  HX CHOLECYSTECTOMY  HX GASTRIC BYPASS  HX HYSTERECTOMY  2008 Family History: 
Family History Problem Relation Age of Onset  Diabetes Mother  Hypertension Mother Social History: 
Social History Tobacco Use  Smoking status: Never Smoker  Smokeless tobacco: Never Used Substance Use Topics  Alcohol use: No  
  Alcohol/week: 0.0 oz  Drug use: Not on file Allergies: 
No Known Allergies Review of Systems Review of Systems Constitutional: Negative for chills and fever. Respiratory: Negative for cough, chest tightness, shortness of breath and wheezing. Cardiovascular: Negative for chest pain and palpitations. Gastrointestinal: Negative for abdominal pain, diarrhea, nausea and vomiting. Musculoskeletal: Positive for arthralgias. Right shoulder pain Neurological: Negative for syncope and headaches. All other systems reviewed and are negative. Physical Exam  
 
Vitals:  
 11/04/18 1708 11/04/18 1908 BP: 126/85 147/71 Pulse: 83 77 Resp: 20 18 Temp: 98.2 °F (36.8 °C) 98.2 °F (36.8 °C) SpO2: 100% 97% Weight:  71.7 kg (158 lb) Physical Exam  
 Constitutional: She is oriented to person, place, and time. She appears well-developed and well-nourished. She appears distressed. + severe pain distress, guarding right arm to the body HENT:  
Head: Normocephalic and atraumatic. Eyes: EOM are normal. Pupils are equal, round, and reactive to light. Neck: Neck supple. Cardiovascular: Normal rate and regular rhythm. Exam reveals no gallop and no friction rub. No murmur heard. Pulmonary/Chest: Effort normal and breath sounds normal. No respiratory distress. She has no wheezes. She has no rales. Abdominal: Soft. Bowel sounds are normal. She exhibits no distension and no mass. There is no tenderness. There is no rebound and no guarding. Musculoskeletal:  
+ severe TTP over the right shoulder joint without obvious deformity. Patient is unable to lift the arm due to severe pain. Non tender to palpation of the right elbow and right hand. Radial pulses intact and equal.  Cap refill is less than 2 sec. Lymphadenopathy:  
  She has no cervical adenopathy. Neurological: She is alert and oriented to person, place, and time. No cranial nerve deficit. Skin: Skin is warm and dry. No rash noted. She is not diaphoretic. Psychiatric: Her behavior is normal.  
tearful Nursing note and vitals reviewed. Diagnostic Study Results Labs - Recent Results (from the past 12 hour(s)) CBC WITH AUTOMATED DIFF Collection Time: 11/04/18  6:27 PM  
Result Value Ref Range WBC 13.2 4.6 - 13.2 K/uL  
 RBC 4.14 (L) 4.20 - 5.30 M/uL HGB 9.9 (L) 12.0 - 16.0 g/dL HCT 32.5 (L) 35.0 - 45.0 % MCV 78.5 74.0 - 97.0 FL  
 MCH 23.9 (L) 24.0 - 34.0 PG  
 MCHC 30.5 (L) 31.0 - 37.0 g/dL  
 RDW 17.0 (H) 11.6 - 14.5 % PLATELET 327 726 - 667 K/uL MPV 11.4 9.2 - 11.8 FL  
 NEUTROPHILS 66 40 - 73 % LYMPHOCYTES 24 21 - 52 % MONOCYTES 9 3 - 10 % EOSINOPHILS 1 0 - 5 % BASOPHILS 0 0 - 2 %  
 ABS. NEUTROPHILS 8.8 (H) 1.8 - 8.0 K/UL ABS. LYMPHOCYTES 3.1 0.9 - 3.6 K/UL  
 ABS. MONOCYTES 1.1 0.05 - 1.2 K/UL  
 ABS. EOSINOPHILS 0.1 0.0 - 0.4 K/UL  
 ABS. BASOPHILS 0.0 0.0 - 0.1 K/UL  
 DF AUTOMATED METABOLIC PANEL, BASIC Collection Time: 11/04/18  6:27 PM  
Result Value Ref Range Sodium 140 136 - 145 mmol/L Potassium 4.8 3.5 - 5.5 mmol/L Chloride 103 100 - 108 mmol/L  
 CO2 24 21 - 32 mmol/L Anion gap 13 3.0 - 18 mmol/L Glucose 101 (H) 74 - 99 mg/dL BUN 15 7.0 - 18 MG/DL Creatinine 0.74 0.6 - 1.3 MG/DL  
 BUN/Creatinine ratio 20 12 - 20 GFR est AA >60 >60 ml/min/1.73m2 GFR est non-AA >60 >60 ml/min/1.73m2 Calcium 8.9 8.5 - 10.1 MG/DL PROTHROMBIN TIME + INR Collection Time: 11/04/18  6:27 PM  
Result Value Ref Range Prothrombin time 12.5 11.5 - 15.2 sec INR 1.0 0.8 - 1.2 EKG, 12 LEAD, INITIAL Collection Time: 11/04/18  7:04 PM  
Result Value Ref Range Ventricular Rate 75 BPM  
 Atrial Rate 75 BPM  
 P-R Interval 144 ms QRS Duration 72 ms Q-T Interval 382 ms QTC Calculation (Bezet) 426 ms Calculated P Axis 85 degrees Calculated R Axis 20 degrees Calculated T Axis 44 degrees Diagnosis Sinus rhythm with premature atrial complexes Septal infarct , age undetermined Abnormal ECG When compared with ECG of 07-SEP-2018 17:10, 
premature atrial complexes are now present Septal infarct is now present Criteria for Inferior infarct are no longer present Nonspecific T wave abnormality has replaced inverted T waves in Inferior  
leads Nonspecific T wave abnormality now evident in Anterior leads Radiologic Studies -  
CT SHOULDER RT WO CONT Final Result XR SHOULDER RT AP/LAT MIN 2 V Final Result CT Results  (Last 48 hours) 11/04/18 1938  CT SHOULDER RT WO CONT Final result Impression:  IMPRESSION:  
   
Comminuted fracture of the proximal humerus as detailed above. Surgical anchors in the greater tuberosity noted with at least one fracture line extending into  
the greater tuberosity. Ill-defined edema within triceps muscle belly, and ill-defined soft tissue edema  
about the shoulder. See additional details above. Narrative:  EXAMINATION: CT right shoulder without contrast  
   
INDICATION: Right humerus fracture, abnormal radiographs COMPARISON: Same date radiographs, MRI 7/30/2018 TECHNIQUE: CT of the right shoulder performed without contrast with multiplanar  
reformations. All CT scans at this facility are performed using dose  
optimization technique as appropriate to a performed exam, to include automated  
exposure control, adjustment of the mA and/or kV according to patient size  
(including appropriate matching first site specific examinations), or use of  
iterative reconstruction technique. FINDINGS:  
   
Bones/joints: Mildly comminuted fracture of the proximal humerus centered at  
surgical neck level, with minimal foreshortening, mild apex anterior angulation,  
and minimal medial displacement. Greater tuberosity lucent tracks from surgical  
anchors noted. A nondisplaced fracture line towards anterior humeral head  
extends into the greater tuberosity and into the region of the surgical anchors  
(sagittal images 14-17, coronal images 29-31). Minimal lateral acromion and  
inferior spurring. Acromioclavicular and glenohumeral joints maintained. Muscles/tendons: Suboptimally evaluated by CT. No obvious high-grade injury. There is evidence of edema within the triceps muscle belly. Miscellaneous: Ill-defined soft tissue edema about the right shoulder. CXR Results  (Last 48 hours) None Medical Decision Making I am the first provider for this patient. I reviewed the vital signs, available nursing notes, past medical history, past surgical history, family history and social history. Vital Signs-Reviewed the patient's vital signs. EKG: Interpreted by the EP Time Interpreted:  
 Rate:  
 Rhythm: 
 Interpretation: 
 Comparison: 
 
Records Reviewed: Nursing Notes, Old Medical Records and Previous Radiology Studies Procedures: 
Procedures Provider Notes (Medical Decision Making):  
Noted XR with impacted humeral neck fracture, mildly angulated. Updated Dr. Konrad Coffey about the patient, he will go see the patient. Will page Dr. Mac Lindsay, her surgeon. [facility] Consult:  Spoke with Dr. Mac Lindsay about this patient. He will look at the XR and call me back. Ct [facility] Dr. here to see patient in the ER. We rounded on patient together. He will admit her, would like for her to get a CT. Will place admit orders for Dr. Mac Lindsay, put in pain meds 1-2 dilaudid q2h for pain, zofran 4-8 mg q8h, 100 ml NS infusion, NPO after midnight. Patient and family agree with the plan. Ct [facility] Impression:  Right proximal humeral head fracture with intractable pain. Will admit to ortho. She will be NPO after midnight. Patient and family agree. MENA Mckeon 
 
MED RECONCILIATION: 
Current Facility-Administered Medications Medication Dose Route Frequency  sodium chloride 0.9 % bolus infusion  100 mL/hr IntraVENous CONTINUOUS  
 HYDROmorphone (DILAUDID) injection 1-2 mg  1-2 mg IntraVENous Q2H PRN  
 ondansetron (ZOFRAN) injection 4-8 mg  4-8 mg IntraVENous Q8H PRN  
 HYDROmorphone (DILAUDID) syringe 0.5 mg  0.5 mg IntraVENous NOW Current Outpatient Medications Medication Sig  
 multivitamin (ONE A DAY) tablet Take 1 Tab by mouth daily.  cyanocobalamin (VITAMIN B-12) 1,000 mcg tablet Take 1,000 mcg by mouth daily.  acetaminophen (TYLENOL EXTRA STRENGTH) 500 mg tablet Take  by mouth every six (6) hours as needed for Pain.  ibuprofen (MOTRIN) 400 mg tablet Take  by mouth every six (6) hours as needed for Pain.  zolpidem (AMBIEN) 5 mg tablet Take  by mouth nightly as needed for Sleep. Disposition: 
ADmit Diagnosis Clinical Impression: 1. Humeral head fracture, right, closed, initial encounter 2. Acute pain of right shoulder 3. Fall, initial encounter

## 2018-11-05 ENCOUNTER — ANESTHESIA (OUTPATIENT)
Dept: SURGERY | Age: 58
DRG: 494 | End: 2018-11-05
Payer: COMMERCIAL

## 2018-11-05 ENCOUNTER — ANESTHESIA EVENT (OUTPATIENT)
Dept: SURGERY | Age: 58
DRG: 494 | End: 2018-11-05
Payer: COMMERCIAL

## 2018-11-05 ENCOUNTER — HOSPITAL ENCOUNTER (OUTPATIENT)
Dept: PHYSICAL THERAPY | Age: 58
End: 2018-11-05
Payer: COMMERCIAL

## 2018-11-05 ENCOUNTER — APPOINTMENT (OUTPATIENT)
Dept: GENERAL RADIOLOGY | Age: 58
DRG: 494 | End: 2018-11-05
Attending: ORTHOPAEDIC SURGERY
Payer: COMMERCIAL

## 2018-11-05 LAB
ATRIAL RATE: 75 BPM
CALCULATED P AXIS, ECG09: 85 DEGREES
CALCULATED R AXIS, ECG10: 20 DEGREES
CALCULATED T AXIS, ECG11: 44 DEGREES
DIAGNOSIS, 93000: NORMAL
P-R INTERVAL, ECG05: 144 MS
Q-T INTERVAL, ECG07: 382 MS
QRS DURATION, ECG06: 72 MS
QTC CALCULATION (BEZET), ECG08: 426 MS
VENTRICULAR RATE, ECG03: 75 BPM

## 2018-11-05 PROCEDURE — 0PSC06Z REPOSITION RIGHT HUMERAL HEAD WITH INTRAMEDULLARY INTERNAL FIXATION DEVICE, OPEN APPROACH: ICD-10-PCS | Performed by: ORTHOPAEDIC SURGERY

## 2018-11-05 PROCEDURE — 76060000034 HC ANESTHESIA 1.5 TO 2 HR: Performed by: ORTHOPAEDIC SURGERY

## 2018-11-05 PROCEDURE — 77030018836 HC SOL IRR NACL ICUM -A: Performed by: ORTHOPAEDIC SURGERY

## 2018-11-05 PROCEDURE — 74011000250 HC RX REV CODE- 250: Performed by: ORTHOPAEDIC SURGERY

## 2018-11-05 PROCEDURE — 64416 NJX AA&/STRD BRCH PL NFS IMG: CPT | Performed by: ANESTHESIOLOGY

## 2018-11-05 PROCEDURE — 77030032490 HC SLV COMPR SCD KNE COVD -B: Performed by: ORTHOPAEDIC SURGERY

## 2018-11-05 PROCEDURE — 74011000250 HC RX REV CODE- 250: Performed by: ANESTHESIOLOGY

## 2018-11-05 PROCEDURE — 74011250636 HC RX REV CODE- 250/636

## 2018-11-05 PROCEDURE — C1713 ANCHOR/SCREW BN/BN,TIS/BN: HCPCS | Performed by: ORTHOPAEDIC SURGERY

## 2018-11-05 PROCEDURE — 74011250637 HC RX REV CODE- 250/637: Performed by: PHYSICIAN ASSISTANT

## 2018-11-05 PROCEDURE — 77030003601 HC NDL NRV BLK BBMI -A: Performed by: ORTHOPAEDIC SURGERY

## 2018-11-05 PROCEDURE — 77030020782 HC GWN BAIR PAWS FLX 3M -B: Performed by: ORTHOPAEDIC SURGERY

## 2018-11-05 PROCEDURE — 74011250636 HC RX REV CODE- 250/636: Performed by: ORTHOPAEDIC SURGERY

## 2018-11-05 PROCEDURE — 77030032497 HC WRP SHLDR WO BGS SOLM -B: Performed by: ORTHOPAEDIC SURGERY

## 2018-11-05 PROCEDURE — 0PSC04Z REPOSITION RIGHT HUMERAL HEAD WITH INTERNAL FIXATION DEVICE, OPEN APPROACH: ICD-10-PCS | Performed by: ORTHOPAEDIC SURGERY

## 2018-11-05 PROCEDURE — 77030008848 HC WRE K SYNT -B: Performed by: ORTHOPAEDIC SURGERY

## 2018-11-05 PROCEDURE — 74011250636 HC RX REV CODE- 250/636: Performed by: ANESTHESIOLOGY

## 2018-11-05 PROCEDURE — 77030017024 HC ENDCAP HUM NL1 SYNT -C: Performed by: ORTHOPAEDIC SURGERY

## 2018-11-05 PROCEDURE — 77030019605: Performed by: ORTHOPAEDIC SURGERY

## 2018-11-05 PROCEDURE — 77030016474 HC BIT DRL QC3 SYNT -C: Performed by: ORTHOPAEDIC SURGERY

## 2018-11-05 PROCEDURE — 74011000250 HC RX REV CODE- 250

## 2018-11-05 PROCEDURE — 3E0T3BZ INTRODUCTION OF ANESTHETIC AGENT INTO PERIPHERAL NERVES AND PLEXI, PERCUTANEOUS APPROACH: ICD-10-PCS | Performed by: ANESTHESIOLOGY

## 2018-11-05 PROCEDURE — 65270000029 HC RM PRIVATE

## 2018-11-05 PROCEDURE — 76942 ECHO GUIDE FOR BIOPSY: CPT | Performed by: ANESTHESIOLOGY

## 2018-11-05 PROCEDURE — C1769 GUIDE WIRE: HCPCS | Performed by: ORTHOPAEDIC SURGERY

## 2018-11-05 PROCEDURE — 73030 X-RAY EXAM OF SHOULDER: CPT

## 2018-11-05 PROCEDURE — 76010000153 HC OR TIME 1.5 TO 2 HR: Performed by: ORTHOPAEDIC SURGERY

## 2018-11-05 PROCEDURE — 77030003787: Performed by: ORTHOPAEDIC SURGERY

## 2018-11-05 PROCEDURE — 74011250636 HC RX REV CODE- 250/636: Performed by: PHYSICIAN ASSISTANT

## 2018-11-05 PROCEDURE — 76210000016 HC OR PH I REC 1 TO 1.5 HR: Performed by: ORTHOPAEDIC SURGERY

## 2018-11-05 PROCEDURE — 77010033678 HC OXYGEN DAILY

## 2018-11-05 DEVICE — ENDCAP ORTH 0MM EXTN TI W/ T25 STARDRV FOR HUM NAIL-EX SPRL: Type: IMPLANTABLE DEVICE | Site: SHOULDER | Status: FUNCTIONAL

## 2018-11-05 DEVICE — SCREW BNE L24MM DIA4MM BLU TI ST CANN LOK FULL THRD T25: Type: IMPLANTABLE DEVICE | Site: SHOULDER | Status: FUNCTIONAL

## 2018-11-05 DEVICE — TI SPIRAL BLADE 40MM FOR TI HUMERAL NAILS: Type: IMPLANTABLE DEVICE | Site: SHOULDER | Status: FUNCTIONAL

## 2018-11-05 DEVICE — NAIL IM L150MM DIA9MM UNIV PROX HUM BLU TI CANN LOK RND: Type: IMPLANTABLE DEVICE | Site: SHOULDER | Status: FUNCTIONAL

## 2018-11-05 RX ORDER — CELECOXIB 100 MG/1
200 CAPSULE ORAL 2 TIMES DAILY
Status: DISCONTINUED | OUTPATIENT
Start: 2018-11-05 | End: 2018-11-07 | Stop reason: HOSPADM

## 2018-11-05 RX ORDER — PROPOFOL 10 MG/ML
INJECTION, EMULSION INTRAVENOUS AS NEEDED
Status: DISCONTINUED | OUTPATIENT
Start: 2018-11-05 | End: 2018-11-05 | Stop reason: HOSPADM

## 2018-11-05 RX ORDER — FENTANYL CITRATE 50 UG/ML
INJECTION, SOLUTION INTRAMUSCULAR; INTRAVENOUS AS NEEDED
Status: DISCONTINUED | OUTPATIENT
Start: 2018-11-05 | End: 2018-11-05 | Stop reason: HOSPADM

## 2018-11-05 RX ORDER — FENTANYL CITRATE 50 UG/ML
50 INJECTION, SOLUTION INTRAMUSCULAR; INTRAVENOUS AS NEEDED
Status: DISCONTINUED | OUTPATIENT
Start: 2018-11-05 | End: 2018-11-05 | Stop reason: HOSPADM

## 2018-11-05 RX ORDER — KETOROLAC TROMETHAMINE 30 MG/ML
INJECTION, SOLUTION INTRAMUSCULAR; INTRAVENOUS AS NEEDED
Status: DISCONTINUED | OUTPATIENT
Start: 2018-11-05 | End: 2018-11-05 | Stop reason: HOSPADM

## 2018-11-05 RX ORDER — DEXAMETHASONE SODIUM PHOSPHATE 4 MG/ML
INJECTION, SOLUTION INTRA-ARTICULAR; INTRALESIONAL; INTRAMUSCULAR; INTRAVENOUS; SOFT TISSUE AS NEEDED
Status: DISCONTINUED | OUTPATIENT
Start: 2018-11-05 | End: 2018-11-05 | Stop reason: HOSPADM

## 2018-11-05 RX ORDER — ONDANSETRON 2 MG/ML
INJECTION INTRAMUSCULAR; INTRAVENOUS AS NEEDED
Status: DISCONTINUED | OUTPATIENT
Start: 2018-11-05 | End: 2018-11-05 | Stop reason: HOSPADM

## 2018-11-05 RX ORDER — SODIUM CHLORIDE 0.9 % (FLUSH) 0.9 %
5-10 SYRINGE (ML) INJECTION AS NEEDED
Status: DISCONTINUED | OUTPATIENT
Start: 2018-11-05 | End: 2018-11-05 | Stop reason: HOSPADM

## 2018-11-05 RX ORDER — NALOXONE HYDROCHLORIDE 1 MG/ML
1 INJECTION INTRAMUSCULAR; INTRAVENOUS; SUBCUTANEOUS
Status: DISPENSED | OUTPATIENT
Start: 2018-11-05 | End: 2018-11-06

## 2018-11-05 RX ORDER — LIDOCAINE HYDROCHLORIDE 20 MG/ML
INJECTION, SOLUTION EPIDURAL; INFILTRATION; INTRACAUDAL; PERINEURAL AS NEEDED
Status: DISCONTINUED | OUTPATIENT
Start: 2018-11-05 | End: 2018-11-05 | Stop reason: HOSPADM

## 2018-11-05 RX ORDER — AMOXICILLIN 250 MG
1 CAPSULE ORAL DAILY
Status: DISCONTINUED | OUTPATIENT
Start: 2018-11-06 | End: 2018-11-07 | Stop reason: HOSPADM

## 2018-11-05 RX ORDER — PROMETHAZINE HYDROCHLORIDE 25 MG/ML
12.5 INJECTION, SOLUTION INTRAMUSCULAR; INTRAVENOUS
Status: DISCONTINUED | OUTPATIENT
Start: 2018-11-05 | End: 2018-11-05 | Stop reason: HOSPADM

## 2018-11-05 RX ORDER — FENTANYL CITRATE 50 UG/ML
100 INJECTION, SOLUTION INTRAMUSCULAR; INTRAVENOUS ONCE
Status: COMPLETED | OUTPATIENT
Start: 2018-11-05 | End: 2018-11-05

## 2018-11-05 RX ORDER — SUCCINYLCHOLINE CHLORIDE 20 MG/ML
INJECTION INTRAMUSCULAR; INTRAVENOUS AS NEEDED
Status: DISCONTINUED | OUTPATIENT
Start: 2018-11-05 | End: 2018-11-05 | Stop reason: HOSPADM

## 2018-11-05 RX ORDER — ZOLPIDEM TARTRATE 5 MG/1
5 TABLET ORAL
Status: DISCONTINUED | OUTPATIENT
Start: 2018-11-05 | End: 2018-11-07 | Stop reason: HOSPADM

## 2018-11-05 RX ORDER — ONDANSETRON 2 MG/ML
4 INJECTION INTRAMUSCULAR; INTRAVENOUS ONCE
Status: DISCONTINUED | OUTPATIENT
Start: 2018-11-05 | End: 2018-11-05 | Stop reason: HOSPADM

## 2018-11-05 RX ORDER — PHENYLEPHRINE HCL IN 0.9% NACL 1 MG/10 ML
SYRINGE (ML) INTRAVENOUS AS NEEDED
Status: DISCONTINUED | OUTPATIENT
Start: 2018-11-05 | End: 2018-11-05 | Stop reason: HOSPADM

## 2018-11-05 RX ORDER — EPHEDRINE SULFATE/0.9% NACL/PF 25 MG/5 ML
SYRINGE (ML) INTRAVENOUS AS NEEDED
Status: DISCONTINUED | OUTPATIENT
Start: 2018-11-05 | End: 2018-11-05 | Stop reason: HOSPADM

## 2018-11-05 RX ORDER — OXYCODONE HYDROCHLORIDE 5 MG/1
5 TABLET ORAL
Status: DISCONTINUED | OUTPATIENT
Start: 2018-11-05 | End: 2018-11-07 | Stop reason: HOSPADM

## 2018-11-05 RX ORDER — HYDROMORPHONE HYDROCHLORIDE 2 MG/ML
0.5 INJECTION, SOLUTION INTRAMUSCULAR; INTRAVENOUS; SUBCUTANEOUS
Status: DISCONTINUED | OUTPATIENT
Start: 2018-11-05 | End: 2018-11-05 | Stop reason: HOSPADM

## 2018-11-05 RX ORDER — SODIUM CHLORIDE, SODIUM LACTATE, POTASSIUM CHLORIDE, CALCIUM CHLORIDE 600; 310; 30; 20 MG/100ML; MG/100ML; MG/100ML; MG/100ML
INJECTION, SOLUTION INTRAVENOUS
Status: DISCONTINUED | OUTPATIENT
Start: 2018-11-05 | End: 2018-11-05 | Stop reason: HOSPADM

## 2018-11-05 RX ORDER — ZOLPIDEM TARTRATE 5 MG/1
10 TABLET ORAL
Status: DISCONTINUED | OUTPATIENT
Start: 2018-11-05 | End: 2018-11-05

## 2018-11-05 RX ORDER — ROPIVACAINE HYDROCHLORIDE 5 MG/ML
20 INJECTION, SOLUTION EPIDURAL; INFILTRATION; PERINEURAL
Status: COMPLETED | OUTPATIENT
Start: 2018-11-05 | End: 2018-11-05

## 2018-11-05 RX ORDER — ACETAMINOPHEN 500 MG
1000 TABLET ORAL EVERY 8 HOURS
Status: DISCONTINUED | OUTPATIENT
Start: 2018-11-05 | End: 2018-11-07 | Stop reason: HOSPADM

## 2018-11-05 RX ORDER — MIDAZOLAM HYDROCHLORIDE 1 MG/ML
2 INJECTION, SOLUTION INTRAMUSCULAR; INTRAVENOUS ONCE
Status: COMPLETED | OUTPATIENT
Start: 2018-11-05 | End: 2018-11-05

## 2018-11-05 RX ORDER — ROPIVACAINE HYDROCHLORIDE 5 MG/ML
INJECTION, SOLUTION EPIDURAL; INFILTRATION; PERINEURAL
Status: COMPLETED | OUTPATIENT
Start: 2018-11-05 | End: 2018-11-05

## 2018-11-05 RX ORDER — CEFAZOLIN SODIUM 2 G/50ML
2 SOLUTION INTRAVENOUS ONCE
Status: COMPLETED | OUTPATIENT
Start: 2018-11-05 | End: 2018-11-05

## 2018-11-05 RX ORDER — ACETAMINOPHEN 500 MG
500 TABLET ORAL
Status: DISCONTINUED | OUTPATIENT
Start: 2018-11-05 | End: 2018-11-05

## 2018-11-05 RX ADMIN — FENTANYL CITRATE 50 MCG: 50 INJECTION, SOLUTION INTRAMUSCULAR; INTRAVENOUS at 13:00

## 2018-11-05 RX ADMIN — WATER 1 G: 1 INJECTION INTRAMUSCULAR; INTRAVENOUS; SUBCUTANEOUS at 20:05

## 2018-11-05 RX ADMIN — FENTANYL CITRATE 100 MCG: 50 INJECTION INTRAMUSCULAR; INTRAVENOUS at 11:19

## 2018-11-05 RX ADMIN — ONDANSETRON 4 MG: 2 INJECTION INTRAMUSCULAR; INTRAVENOUS at 06:11

## 2018-11-05 RX ADMIN — Medication 5 MG: at 12:42

## 2018-11-05 RX ADMIN — PROPOFOL 150 MG: 10 INJECTION, EMULSION INTRAVENOUS at 12:27

## 2018-11-05 RX ADMIN — ROPIVACAINE HYDROCHLORIDE 20 ML: 5 INJECTION, SOLUTION EPIDURAL; INFILTRATION; PERINEURAL at 12:00

## 2018-11-05 RX ADMIN — ACETAMINOPHEN 500 MG: 500 TABLET ORAL at 08:18

## 2018-11-05 RX ADMIN — ROPIVACAINE HYDROCHLORIDE 100 MG: 5 INJECTION, SOLUTION EPIDURAL; INFILTRATION; PERINEURAL at 11:51

## 2018-11-05 RX ADMIN — HYDROMORPHONE HYDROCHLORIDE 2 MG: 2 INJECTION INTRAMUSCULAR; INTRAVENOUS; SUBCUTANEOUS at 00:13

## 2018-11-05 RX ADMIN — FENTANYL CITRATE 50 MCG: 50 INJECTION, SOLUTION INTRAMUSCULAR; INTRAVENOUS at 13:18

## 2018-11-05 RX ADMIN — LIDOCAINE HYDROCHLORIDE 80 MG: 20 INJECTION, SOLUTION EPIDURAL; INFILTRATION; INTRACAUDAL; PERINEURAL at 12:27

## 2018-11-05 RX ADMIN — HYDROMORPHONE HYDROCHLORIDE 1 MG: 2 INJECTION INTRAMUSCULAR; INTRAVENOUS; SUBCUTANEOUS at 08:18

## 2018-11-05 RX ADMIN — ACETAMINOPHEN 1000 MG: 500 TABLET ORAL at 17:46

## 2018-11-05 RX ADMIN — KETOROLAC TROMETHAMINE 30 MG: 30 INJECTION, SOLUTION INTRAMUSCULAR; INTRAVENOUS at 13:35

## 2018-11-05 RX ADMIN — ACETAMINOPHEN 1000 MG: 500 TABLET ORAL at 21:48

## 2018-11-05 RX ADMIN — CELECOXIB 200 MG: 100 CAPSULE ORAL at 17:46

## 2018-11-05 RX ADMIN — ONDANSETRON 4 MG: 2 INJECTION INTRAMUSCULAR; INTRAVENOUS at 12:34

## 2018-11-05 RX ADMIN — MIDAZOLAM HYDROCHLORIDE 2 MG: 2 INJECTION, SOLUTION INTRAMUSCULAR; INTRAVENOUS at 11:46

## 2018-11-05 RX ADMIN — SUCCINYLCHOLINE CHLORIDE 100 MG: 20 INJECTION INTRAMUSCULAR; INTRAVENOUS at 12:28

## 2018-11-05 RX ADMIN — FAMOTIDINE: 10 INJECTION, SOLUTION INTRAVENOUS at 11:20

## 2018-11-05 RX ADMIN — DEXAMETHASONE SODIUM PHOSPHATE 8 MG: 4 INJECTION, SOLUTION INTRA-ARTICULAR; INTRALESIONAL; INTRAMUSCULAR; INTRAVENOUS; SOFT TISSUE at 12:34

## 2018-11-05 RX ADMIN — SODIUM CHLORIDE 100 ML/HR: 900 INJECTION, SOLUTION INTRAVENOUS at 06:21

## 2018-11-05 RX ADMIN — CEFAZOLIN SODIUM 2 G: 2 SOLUTION INTRAVENOUS at 12:25

## 2018-11-05 RX ADMIN — OXYCODONE HYDROCHLORIDE 5 MG: 5 TABLET ORAL at 18:57

## 2018-11-05 RX ADMIN — SODIUM CHLORIDE, SODIUM LACTATE, POTASSIUM CHLORIDE, CALCIUM CHLORIDE: 600; 310; 30; 20 INJECTION, SOLUTION INTRAVENOUS at 12:18

## 2018-11-05 RX ADMIN — FENTANYL CITRATE 100 MCG: 50 INJECTION, SOLUTION INTRAMUSCULAR; INTRAVENOUS at 12:27

## 2018-11-05 RX ADMIN — Medication 100 MCG: at 12:42

## 2018-11-05 RX ADMIN — SODIUM CHLORIDE 100 ML/HR: 900 INJECTION, SOLUTION INTRAVENOUS at 20:37

## 2018-11-05 RX ADMIN — HYDROMORPHONE HYDROCHLORIDE 2 MG: 2 INJECTION INTRAMUSCULAR; INTRAVENOUS; SUBCUTANEOUS at 21:49

## 2018-11-05 RX ADMIN — HYDROMORPHONE HYDROCHLORIDE 1 MG: 2 INJECTION INTRAMUSCULAR; INTRAVENOUS; SUBCUTANEOUS at 04:46

## 2018-11-05 NOTE — PROGRESS NOTES
Date of Surgery Update: 
Elif Rucker was seen and examined. History and physical has been reviewed. The patient has been examined.  There have been no significant clinical changes since the completion of the originally dated History and Physical. 
 
Signed By: Josh Coleman MD   
 November 5, 2018 11:09 AM

## 2018-11-05 NOTE — PROGRESS NOTES
TRANSFER - IN REPORT: 
 
Verbal report received from [address] [place] [address], RN (name) on Unknown Mohs  being received from [facility] ER(unit) for routine progression of care Report consisted of patients Situation, Background, Assessment and  
Recommendations(SBAR). Information from the following report(s) SBAR, Kardex, ED Summary, Intake/Output and MAR was reviewed with the receiving nurse. Opportunity for questions and clarification was provided. Assessment completed upon patients arrival to unit and care assumed.

## 2018-11-05 NOTE — PROGRESS NOTES
Received pt in stable condition, General: lying in bed in supine, not apparent distress Neuro: AOx4, RUE: neuro intact, able to wiggle fingers, sensation present Cardio: RUE: cap refill < 3 sec Respiratory: denies shortness of breath Skin: intact GI: voiding : denies nausea and vomiting Mus: ambulates c assistance; sling to RUE Bed in low position and call bell within reach.

## 2018-11-05 NOTE — PROGRESS NOTES
Problem: Falls - Risk of 
Goal: *Absence of Falls Document León Hutson Fall Risk and appropriate interventions in the flowsheet. Outcome: Progressing Towards Goal 
Fall Risk Interventions: 
Mobility Interventions: Patient to call before getting OOB Medication Interventions: Patient to call before getting OOB, Teach patient to arise slowly Elimination Interventions: Call light in reach, Patient to call for help with toileting needs History of Falls Interventions: Door open when patient unattended

## 2018-11-05 NOTE — ANESTHESIA PREPROCEDURE EVALUATION
Anesthetic History No history of anesthetic complications Review of Systems / Medical History Patient summary reviewed, nursing notes reviewed and pertinent labs reviewed Pulmonary Within defined limits Neuro/Psych Within defined limits Cardiovascular Within defined limits Exercise tolerance: >4 METS 
  
GI/Hepatic/Renal 
Within defined limits Endo/Other Within defined limits Other Findings Physical Exam 
 
Airway Mallampati: II 
TM Distance: 4 - 6 cm Neck ROM: normal range of motion Mouth opening: Normal 
 
 Cardiovascular Regular rate and rhythm,  S1 and S2 normal,  no murmur, click, rub, or gallop Rhythm: regular Rate: normal 
 
 
 
 Dental 
No notable dental hx Pulmonary Breath sounds clear to auscultation Abdominal 
GI exam deferred Other Findings Anesthetic Plan ASA: 2 Anesthesia type: general and regional - interscalene block Post-op pain plan if not by surgeon: peripheral nerve block single Induction: Intravenous Anesthetic plan and risks discussed with: Patient

## 2018-11-05 NOTE — ANESTHESIA POSTPROCEDURE EVALUATION
Procedure(s): RIGHT SHOULDER OPEN REDUCTION INTERNAL FIXATION/C-ARM/MULTI-LOCKING NAILS SYNTHES/POSSIBLE NERVE BLOCK. Anesthesia Post Evaluation Multimodal analgesia: multimodal analgesia used between 6 hours prior to anesthesia start to PACU discharge Patient location during evaluation: PACU Patient participation: complete - patient participated Level of consciousness: awake Pain management: satisfactory to patient Airway patency: patent Anesthetic complications: no 
Cardiovascular status: acceptable Respiratory status: acceptable Hydration status: acceptable Visit Vitals /60 Pulse 98 Temp 37.1 °C (98.8 °F) Resp 18 Wt 71.7 kg (158 lb) SpO2 100% BMI 26.29 kg/m²

## 2018-11-05 NOTE — PERIOP NOTES
TRANSFER - OUT REPORT: 
 
Verbal report given to Kate DAVIS(name) on Duong Miller  being transferred to [address] [place] [address](unit) for routine post - op Report consisted of patients Situation, Background, Assessment and  
Recommendations(SBAR). Information from the following report(s) SBAR, Kardex, OR Summary, Procedure Summary, Intake/Output, MAR, Recent Results and Med Rec Status was reviewed with the receiving nurse. Lines:  
Peripheral IV 11/04/18 Right Antecubital (Active) Site Assessment Clean, dry, & intact 11/5/2018  7:30 AM  
Phlebitis Assessment 0 11/5/2018  7:30 AM  
Infiltration Assessment 0 11/5/2018  7:30 AM  
Dressing Status Clean, dry, & intact 11/5/2018  7:30 AM  
Dressing Type Transparent 11/5/2018  7:30 AM  
Hub Color/Line Status Pink; Infusing 11/5/2018  7:30 AM  
Action Taken Open ports on tubing capped 11/5/2018  7:30 AM  
Alcohol Cap Used Yes 11/5/2018  7:30 AM  
  
 
Opportunity for questions and clarification was provided. Patient transported with: 
 O2 @ 2 liters Tech

## 2018-11-05 NOTE — PROGRESS NOTES
Reason for Admission:   Closed head fracture, right, open, initial encounter RRAT Score:    3 Plan for utilizing home health:   [address] signed for [facility], if needed. No orders at this time. Likelihood of Readmission:  low Transition of Care Plan:    Discharged plan is for home vs home [facility]. Met with pt and  with consent to discuss discharge plan. Pt lives in a 1 story condo with  and extended family as additional support system. Prior to admission, pt was independent with ADL's and able do drive self to MD appointments. Owns no DME. Pt was attending [facility] for outpatient rehab. No prior use of [facility] services. [place] of Choice obtained and signed for [facility] if needed. Nathan Grover,  ([phone]) will provide transportation at discharge. CM will continue to monitor for transitional needs. JUAN Candelario, [phone] Care Management Interventions PCP Verified by CM: Yes(per pt, saw PCP 3 months ago.) Mode of Transport at Discharge: Self Transition of Care Consult (CM Consult): Discharge Planning MyChart Signup: No 
Discharge Durable Medical Equipment: No 
Physical Therapy Consult: No 
Occupational Therapy Consult: No 
Speech Therapy Consult: No 
Current Support Network: Lives with Spouse Confirm Follow Up Transport: Self Plan discussed with Pt/Family/Caregiver: Yes Freedom of Choice Offered: Yes Discharge Location Discharge Placement: Home

## 2018-11-05 NOTE — ANESTHESIA PROCEDURE NOTES
Peripheral Block Start time: 11/5/2018 11:40 AM 
End time: 11/5/2018 12:00 PM 
Performed by: Carlos Self MD 
Authorized by: Carlos Self MD  
 
 
Pre-procedure: Indications: at surgeon's request and post-op pain management Preanesthetic Checklist: patient identified, risks and benefits discussed, site marked, timeout performed, anesthesia consent given and patient being monitored Timeout Time: 11:45 Block Type:  
Block Type: Interscalene Laterality:  Right Monitoring:  Standard ASA monitoring, continuous pulse ox, frequent vital sign checks, heart rate, oxygen and responsive to questions Injection Technique:  Single shot Procedures: ultrasound guided Patient Position: seated Prep: chlorhexidine Location:  Interscalene Needle Type:  Stimuplex Needle Gauge:  21 G Needle Localization:  Anatomical landmarks and ultrasound guidance Assessment: 
Number of attempts:  1 Injection Assessment:  Incremental injection every 5 mL, negative aspiration for CSF, no paresthesia, ultrasound image on chart, local visualized surrounding nerve on ultrasound, negative aspiration for blood, no intravascular symptoms and low pressure verified by pressure monitor Patient tolerance:  Patient tolerated the procedure well with no immediate complications

## 2018-11-05 NOTE — H&P
[facility] 
Consult Note Patient: Guy Warren               Sex: female          DOA: 11/4/2018 YOB: 1960      Age:  59 y.o.        LOS:  LOS: 1 day HPI:  
 
Guy Warren is a 59 y.o. female who has been seen for right shoulder pain. She had recent arthroscopic rotator cuff repair on 9/20 with dr. Katerin Bonilla. Last night she slipped on the stairs and fell and had immediate pain. Unable to move her arm. Denies LOC Past Medical History:  
Diagnosis Date  Bell's palsy  Cardiac nuclear imaging test, normal 07/13/2012 Low risk. No ischemia or prior infarction. No RWMA. EF 64%. Nondiagnostic EKG on EST. Ex time 10 min 41 sec.  Lump Palm of the left hand Past Surgical History:  
Procedure Laterality Date  HAND/FINGER SURGERY UNLISTED Left   
 cyst removal  
 HX APPENDECTOMY  HX CHOLECYSTECTOMY  HX GASTRIC BYPASS  HX HYSTERECTOMY  2008 Family History Problem Relation Age of Onset  Diabetes Mother  Hypertension Mother Social History Socioeconomic History  Marital status:  Spouse name: Not on file  Number of children: Not on file  Years of education: Not on file  Highest education level: Not on file Social Needs  Financial resource strain: Not on file  Food insecurity - worry: Not on file  Food insecurity - inability: Not on file  Transportation needs - medical: Not on file  Transportation needs - non-medical: Not on file Occupational History  Not on file Tobacco Use  Smoking status: Never Smoker  Smokeless tobacco: Never Used Substance and Sexual Activity  Alcohol use: No  
  Alcohol/week: 0.0 oz  Drug use: Not on file  Sexual activity: Not on file Other Topics Concern  Not on file Social History Narrative  Not on file Prior to Admission medications Medication Sig Start Date End Date Taking? Authorizing Provider  
multivitamin (ONE A DAY) tablet Take 1 Tab by mouth daily. Yes Provider, Historical  
cyanocobalamin (VITAMIN B-12) 1,000 mcg tablet Take 1,000 mcg by mouth daily. Yes Provider, Historical  
zolpidem (AMBIEN) 5 mg tablet Take  by mouth nightly as needed for Sleep. Yes Provider, Historical  
acetaminophen (TYLENOL EXTRA STRENGTH) 500 mg tablet Take  by mouth every six (6) hours as needed for Pain. Provider, Historical  
ibuprofen (MOTRIN) 400 mg tablet Take  by mouth every six (6) hours as needed for Pain. Provider, Historical  
 
 
No Known Allergies Review of Systems Negative for any fever, chills, sweats, headache, blurred vision, cough, congestion, SOB, abdominal pain, bleeding, bruising, numbness, or tingling. 12 point ROS otherwise negative other than noted in the HPI Physical Exam:  
  
Visit Vitals /76 (BP 1 Location: Left arm, BP Patient Position: At rest) Pulse 85 Temp 98.9 °F (37.2 °C) Resp 15 Wt 158 lb (71.7 kg) SpO2 98% BMI 26.29 kg/m² Physical Exam: 
General: Well developed, well nourished, 59 y.o. female in  NAD Vitals: Blood pressure 126/76, pulse 85, temperature 98.9 °F (37.2 °C), resp. rate 15, weight 158 lb (71.7 kg), SpO2 98 %. Skin: No rashs, ulcers, icterus, or other obvious lesions/ lacerations Eyes: EOM intact, PERRLA  
ENM: Without obvious lesions. Nares patent. Moist mucous membranes. Neck: Supple, FROM Lungs: CTAB Heart: RRR, normal S1, S2. No murmurs, rubs, or gallops Abdomen: Soft, NT, bowel sounds present all 4 quadrants Musculoskeletal:  
Swelling to right shoulder Diffuse ttp Unable to move arm secondary to fracture Neuro: AAOx3. Without obvious deficits Labs Reviewed: 
BMP:  
Lab Results Component Value Date/Time   11/04/2018 06:27 PM  
 K 4.8 11/04/2018 06:27 PM  
  11/04/2018 06:27 PM  
 CO2 24 11/04/2018 06:27 PM  
 AGAP 13 11/04/2018 06:27 PM  
  (H) 11/04/2018 06:27 PM  
 BUN 15 11/04/2018 06:27 PM  
 CREA 0.74 11/04/2018 06:27 PM  
 GFRAA >60 11/04/2018 06:27 PM  
 GFRNA >60 11/04/2018 06:27 PM  
 
CBC:  
Lab Results Component Value Date/Time WBC 13.2 11/04/2018 06:27 PM  
 HGB 9.9 (L) 11/04/2018 06:27 PM  
 HCT 32.5 (L) 11/04/2018 06:27 PM  
  11/04/2018 06:27 PM  
 
 
Ekg: 
Sinus rhythm with premature atrial complexes Septal infarct , age undetermined Abnormal ECG When compared with ECG of 07-SEP-2018 17:10,  
premature atrial complexes are now present Septal infarct is now present Criteria for Inferior infarct are no longer present Nonspecific T wave abnormality has replaced inverted T waves in Inferior  
leads Nonspecific T wave abnormality now evident in Anterior leads IMAGING: 
3 views of the right shoulder obtained. Fracture at the proximal 
humerus centered at the surgical neck and extending towards space of the greater 
tuberosity with mildly distracted fragments, mild foreshortening, and mild apex 
medial angulation. Mild acromioclavicular and glenohumeral degenerative change. Assessment/Plan [unfilled] Active Problems: 
  Humeral head fracture, right, open, initial encounter (11/4/2018) Pt. Stable Pt. NPO x meds Consent Pt. For right shoulder ORIF with possible reverse total shoulder arthroplasty I discussed the risks and benefits and potential adverse outcomes of both operative vs non operative treatment of right proximal humerus fracture with the patient. Risks of operative intervention include but not limited to bleeding, infection, deep vein thrombosis, pulmonary embolism, death, limb length discrepancy, reflexive sympathetic dystrophy, fat embolism syndrome,damage to blood vessels and nerves, malunion, non-union, delayed union, failure of hardware, post traumatic arthritis, stroke, heart attack, and death. Patient understands that infection may arise and may require numerous surgeries. Cardiac consulted for cardiac clearance [facility]  
[phone]

## 2018-11-05 NOTE — BRIEF OP NOTE
BRIEF OPERATIVE NOTE Date of Procedure: 11/5/2018 Preoperative Diagnosis: Fracture at the proximal 
humerus Postoperative Diagnosis:displaced proximal humerus fracture right Procedure(s): RIGHT SHOULDER OPEN REDUCTION INTERNAL FIXATION/ short proximal humeral nail Surgeon(s) and Role: 
   Parveen Barnett MD - Primary Surgical Assistant: Justin Hull Surgical Staff: 
Circ-1: Nusrat Zuniga RN 
Circ-Relief: Isma Mendez Scrub Tech-1: Zoya Betancourt Surg Asst-1: Cheyanne Baldwin Event Time In Time Out Incision Start 11/05/2018 1300 Incision Close 11/05/2018 1349 Anesthesia: General  
Estimated Blood Loss: minimal 
Specimens: * No specimens in log * Findings: as above Complications: none Implants:  
Implant Name Type Inv. Item Serial No.  Lot No. LRB No. Used Action NAIL HUM ELEONORA [id number] STRL -- TI EXPERT - [id number]  NAIL HUM ELEONORA [id number] STRL -- TI EXPERT  [address] [id number] Right 1 Implanted BLADE SPRL F/HUM NL 40MM TI --  - [id number]  BLADE SPRL F/HUM NL 40MM TI --   SYNTHES [place] N/A Right 1 Implanted ENDCAP HUM T25 STRDRV 0MM -- TI - [id number]  ENDCAP HUM T25 STRDRV 0MM -- TI  SYNTHES [place] N/A Right 1 Implanted SCR BNE LCK T25 4X24MM TI --  - [id number]  SCR BNE LCK T25 4X24MM TI --   SYNTHES [place] N/A Right 2 Implanted

## 2018-11-05 NOTE — CONSULTS
[facility] - Consult Note    Date of  Admission: 11/4/2018  5:13 PM     Primary Care Physician:  Catrina Frey MD     Plan:     1) pre op evaluation prior to right shoulder surgery- no active chest pain. ekg possible septal infarct age undetermined with non specific st-t changes and low voltage QRS. Can proceed to planned surgery with low to moderate risk. No clinical evidence of ACS. 2) family h/o CAD    Plan discussed with patient and patient's spouse at length. All questions answered       Assessment:     Hospital Problems  Date Reviewed: 10/16/2018          Codes Class Noted POA    Humeral head fracture, right, open, initial encounter ICD-10-CM: S42.291B  ICD-9-CM: 812.19  11/4/2018 Unknown                   History of Present Illness: This is a 59 y.o. female admitted for Humeral head fracture, right, open, initial encounter. Patient complains of:      59 yr old female with recent right shoulder surgery- had a mechanical fall- and injured and now awaits repeat right shoulder surgery. Usually ambulates well with no chest pain. Has intermittent chest discomfort-- more epigastric lasting few mins with no diaphoresis. Last episode was several weeks ago. No chest pain or pressure. No palpitations. No SOB. No orthopnea. No cough. No pnd. No dizziness or lightheadedness. No fever or chills. No diaphoresis. No leg swelling. No recent syncopal episodes. No blood in stool or urine. No nausea or vomit. No recent CVA. Cardiac risk factors: age         Past Medical History:     Past Medical History:   Diagnosis Date    Bell's palsy     Cardiac nuclear imaging test, normal 07/13/2012    Low risk. No ischemia or prior infarction. No RWMA. EF 64%. Nondiagnostic EKG on EST. Ex time 10 min 41 sec.     Lump     Palm of the left hand         Social History:     Social History     Socioeconomic History    Marital status:      Spouse name: Not on file    Number of children: Not on file    Years of education: Not on file    Highest education level: Not on file   Social Needs    Financial resource strain: Not on file    Food insecurity - worry: Not on file    Food insecurity - inability: Not on file    Transportation needs - medical: Not on file   [employer] needs - non-medical: Not on file   Occupational History    Not on file   Tobacco Use    Smoking status: Never Smoker    Smokeless tobacco: Never Used   Substance and Sexual Activity    Alcohol use: No     Alcohol/week: 0.0 oz    Drug use: Not on file    Sexual activity: Not on file   Other Topics Concern    Not on file   Social History Narrative    Not on file        Family History:     Family History   Problem Relation Age of Onset    Diabetes Mother     Hypertension Mother         Medications:   No Known Allergies     Current Facility-Administered Medications   Medication Dose Route Frequency    acetaminophen (TYLENOL) tablet 500 mg  500 mg Oral Q4H PRN    sodium chloride 0.9 % bolus infusion  100 mL/hr IntraVENous CONTINUOUS    HYDROmorphone (DILAUDID) injection 1-2 mg  1-2 mg IntraVENous Q2H PRN    ondansetron (ZOFRAN) injection 4-8 mg  4-8 mg IntraVENous Q8H PRN        Review Of Systems:       A comprehensive review of systems was negative except for that written in the HPI.     Constitutional: No fever, no chills, no weight loss, no night sweats   HEENT: No epistaxis, no nasal drainage, no difficulty in swallowing, no redness in eyes  Respiratory:  negative for cough, sputum, hemoptysis, pleurisy/chest pain, wheezing, dyspnea on exertion or emphysema  Cardiovascular: no chest pain, no chest pressure, no dyspnea, no pnd, no claudication no palpitations, no chronic leg edema, no syncope  Gastrointestinal: no abd pain, no vomiting, no diarrhea, no bleeding symptoms  Genitourinary: No urinary symptoms or hematuria  Integument/breast: No ulcers or rashes  Musculoskeletal: right shoulder pain  Neurological: No focal weakness, no seizures, no headaches  Behvioral/Psych: No anxiety, no depression             Physical Exam:     Visit Vitals  /69 (BP 1 Location: Left arm, BP Patient Position: Lying left side)   Pulse 84   Temp 98.5 °F (36.9 °C)   Resp 19   Wt 71.7 kg (158 lb)   SpO2 100%   BMI 26.29 kg/m²     BP Readings from Last 3 Encounters:   11/05/18 132/69   10/16/18 116/59   09/27/18 120/68     Pulse Readings from Last 3 Encounters:   11/05/18 84   10/16/18 77   09/27/18 84     Wt Readings from Last 3 Encounters:   11/04/18 71.7 kg (158 lb)   10/16/18 74.3 kg (163 lb 12.8 oz)   09/27/18 73.1 kg (161 lb 3.2 oz)       General:  alert, cooperative, no distress, appears stated age  Skin: Warm and dry, acyanotic, normal color. Head: Normocephalic, atraumatic. Eyes: Sclerae anicteric, conjunctivae without injection. Neck:  nontender, no nuchal rigidity, no masses, no stridor, no carotid bruit, no JVD  Lungs:  clear to auscultation bilaterally, no rhonchi   Heart:  regular rate and rhythm, S1, S2 normal, no murmur, click, rub or gallop  Abdomen:  abdomen is soft without significant tenderness, masses, organomegaly or guarding  Extremities:  Right shoulder - pain with limited mobility  Neurological: grossly intact. No focal abnormalities, moves all extremities well. Psychiatric Affect: The patient is awake, alert and oriented x3. Mari Barroso is interactive and appropriate.    Data Review:     Recent Results (from the past 48 hour(s))   CBC WITH AUTOMATED DIFF    Collection Time: 11/04/18  6:27 PM   Result Value Ref Range    WBC 13.2 4.6 - 13.2 K/uL    RBC 4.14 (L) 4.20 - 5.30 M/uL    HGB 9.9 (L) 12.0 - 16.0 g/dL    HCT 32.5 (L) 35.0 - 45.0 %    MCV 78.5 74.0 - 97.0 FL    MCH 23.9 (L) 24.0 - 34.0 PG    MCHC 30.5 (L) 31.0 - 37.0 g/dL    RDW 17.0 (H) 11.6 - 14.5 %    PLATELET 093 453 - 142 K/uL    MPV 11.4 9.2 - 11.8 FL    NEUTROPHILS 66 40 - 73 %    LYMPHOCYTES 24 21 - 52 %    MONOCYTES 9 3 - 10 %    EOSINOPHILS 1 0 - 5 %    BASOPHILS 0 0 - 2 %    ABS. NEUTROPHILS 8.8 (H) 1.8 - 8.0 K/UL    ABS. LYMPHOCYTES 3.1 0.9 - 3.6 K/UL    ABS. MONOCYTES 1.1 0.05 - 1.2 K/UL    ABS. EOSINOPHILS 0.1 0.0 - 0.4 K/UL    ABS. BASOPHILS 0.0 0.0 - 0.1 K/UL    DF AUTOMATED     METABOLIC PANEL, BASIC    Collection Time: 11/04/18  6:27 PM   Result Value Ref Range    Sodium 140 136 - 145 mmol/L    Potassium 4.8 3.5 - 5.5 mmol/L    Chloride 103 100 - 108 mmol/L    CO2 24 21 - 32 mmol/L    Anion gap 13 3.0 - 18 mmol/L    Glucose 101 (H) 74 - 99 mg/dL    BUN 15 7.0 - 18 MG/DL    Creatinine 0.74 0.6 - 1.3 MG/DL    BUN/Creatinine ratio 20 12 - 20      GFR est AA >60 >60 ml/min/1.73m2    GFR est non-AA >60 >60 ml/min/1.73m2    Calcium 8.9 8.5 - 10.1 MG/DL   PROTHROMBIN TIME + INR    Collection Time: 11/04/18  6:27 PM   Result Value Ref Range    Prothrombin time 12.5 11.5 - 15.2 sec    INR 1.0 0.8 - 1.2     EKG, 12 LEAD, INITIAL    Collection Time: 11/04/18  7:04 PM   Result Value Ref Range    Ventricular Rate 75 BPM    Atrial Rate 75 BPM    P-R Interval 144 ms    QRS Duration 72 ms    Q-T Interval 382 ms    QTC Calculation (Bezet) 426 ms    Calculated P Axis 85 degrees    Calculated R Axis 20 degrees    Calculated T Axis 44 degrees    Diagnosis       Sinus rhythm with premature atrial complexes  Septal infarct , age undetermined  Abnormal ECG  When compared with ECG of 07-SEP-2018 17:10,  premature atrial complexes are now present  Septal infarct is now present  Criteria for Inferior infarct are no longer present  Nonspecific T wave abnormality has replaced inverted T waves in Inferior   leads  Nonspecific T wave abnormality now evident in Anterior leads  Confirmed by Melissa Knight ([id number]) on 11/5/2018 8:37:19 AM           Intake/Output Summary (Last 24 hours) at 11/5/2018 0845  Last data filed at 11/5/2018 9378  Gross per 24 hour   Intake 2038. 33 ml   Output 300 ml   Net 1738.33 ml       Cardiographics:     ECG: normal sinus rhythm, possible septal infarct age undetermined    Echocardiogram: 2016:  SUMMARY:  Left ventricle: Systolic function was normal. Ejection fraction was  estimated in the range of 55 % to 60 %. No obvious wall motion  abnormalities identified in the views obtained. Features were consistent  with a pseudonormal left ventricular filling pattern, with concomitant  abnormal relaxation and increased filling pressure (grade 2 diastolic  dysfunction).       Signed By: Anastacia Mays MD     November 5, 2018

## 2018-11-06 ENCOUNTER — HOME HEALTH ADMISSION (OUTPATIENT)
Dept: HOME HEALTH SERVICES | Facility: HOME HEALTH | Age: 58
End: 2018-11-06
Payer: COMMERCIAL

## 2018-11-06 ENCOUNTER — APPOINTMENT (OUTPATIENT)
Dept: GENERAL RADIOLOGY | Age: 58
DRG: 494 | End: 2018-11-06
Attending: PHYSICIAN ASSISTANT
Payer: COMMERCIAL

## 2018-11-06 LAB
ATRIAL RATE: 67 BPM
CALCULATED P AXIS, ECG09: 37 DEGREES
CALCULATED R AXIS, ECG10: 7 DEGREES
CALCULATED T AXIS, ECG11: 0 DEGREES
DIAGNOSIS, 93000: NORMAL
ERYTHROCYTE [DISTWIDTH] IN BLOOD BY AUTOMATED COUNT: 16.7 % (ref 11.6–14.5)
HCT VFR BLD AUTO: 26.4 % (ref 35–45)
HGB BLD-MCNC: 8 G/DL (ref 12–16)
MCH RBC QN AUTO: 23.8 PG (ref 24–34)
MCHC RBC AUTO-ENTMCNC: 30.3 G/DL (ref 31–37)
MCV RBC AUTO: 78.6 FL (ref 74–97)
P-R INTERVAL, ECG05: 138 MS
PLATELET # BLD AUTO: 320 K/UL (ref 135–420)
PMV BLD AUTO: 10.9 FL (ref 9.2–11.8)
Q-T INTERVAL, ECG07: 348 MS
QRS DURATION, ECG06: 74 MS
QTC CALCULATION (BEZET), ECG08: 367 MS
RBC # BLD AUTO: 3.36 M/UL (ref 4.2–5.3)
VENTRICULAR RATE, ECG03: 67 BPM
WBC # BLD AUTO: 12 K/UL (ref 4.6–13.2)

## 2018-11-06 PROCEDURE — 65270000029 HC RM PRIVATE

## 2018-11-06 PROCEDURE — 36415 COLL VENOUS BLD VENIPUNCTURE: CPT | Performed by: PHYSICIAN ASSISTANT

## 2018-11-06 PROCEDURE — 97165 OT EVAL LOW COMPLEX 30 MIN: CPT

## 2018-11-06 PROCEDURE — 97161 PT EVAL LOW COMPLEX 20 MIN: CPT

## 2018-11-06 PROCEDURE — 74011000250 HC RX REV CODE- 250: Performed by: ORTHOPAEDIC SURGERY

## 2018-11-06 PROCEDURE — 73100 X-RAY EXAM OF WRIST: CPT

## 2018-11-06 PROCEDURE — 74011250636 HC RX REV CODE- 250/636: Performed by: ORTHOPAEDIC SURGERY

## 2018-11-06 PROCEDURE — 93005 ELECTROCARDIOGRAM TRACING: CPT

## 2018-11-06 PROCEDURE — 77010033678 HC OXYGEN DAILY

## 2018-11-06 PROCEDURE — 85027 COMPLETE CBC AUTOMATED: CPT | Performed by: PHYSICIAN ASSISTANT

## 2018-11-06 PROCEDURE — 74011250637 HC RX REV CODE- 250/637: Performed by: PHYSICIAN ASSISTANT

## 2018-11-06 RX ORDER — DIPHENHYDRAMINE HCL 25 MG
25-50 CAPSULE ORAL
Status: DISCONTINUED | OUTPATIENT
Start: 2018-11-06 | End: 2018-11-07 | Stop reason: HOSPADM

## 2018-11-06 RX ADMIN — ACETAMINOPHEN 1000 MG: 500 TABLET ORAL at 05:25

## 2018-11-06 RX ADMIN — HYDROMORPHONE HYDROCHLORIDE 2 MG: 2 INJECTION INTRAMUSCULAR; INTRAVENOUS; SUBCUTANEOUS at 17:19

## 2018-11-06 RX ADMIN — SENNOSIDES AND DOCUSATE SODIUM 1 TABLET: 8.6; 5 TABLET ORAL at 08:16

## 2018-11-06 RX ADMIN — ACETAMINOPHEN 1000 MG: 500 TABLET ORAL at 23:07

## 2018-11-06 RX ADMIN — WATER 1 G: 1 INJECTION INTRAMUSCULAR; INTRAVENOUS; SUBCUTANEOUS at 11:24

## 2018-11-06 RX ADMIN — SODIUM CHLORIDE 100 ML/HR: 900 INJECTION, SOLUTION INTRAVENOUS at 13:43

## 2018-11-06 RX ADMIN — ACETAMINOPHEN 1000 MG: 500 TABLET ORAL at 13:41

## 2018-11-06 RX ADMIN — HYDROMORPHONE HYDROCHLORIDE 2 MG: 2 INJECTION INTRAMUSCULAR; INTRAVENOUS; SUBCUTANEOUS at 23:06

## 2018-11-06 RX ADMIN — DIPHENHYDRAMINE HYDROCHLORIDE 50 MG: 25 CAPSULE ORAL at 23:05

## 2018-11-06 RX ADMIN — HYDROMORPHONE HYDROCHLORIDE 2 MG: 2 INJECTION INTRAMUSCULAR; INTRAVENOUS; SUBCUTANEOUS at 05:26

## 2018-11-06 RX ADMIN — HYDROMORPHONE HYDROCHLORIDE 2 MG: 2 INJECTION INTRAMUSCULAR; INTRAVENOUS; SUBCUTANEOUS at 11:25

## 2018-11-06 RX ADMIN — HYDROMORPHONE HYDROCHLORIDE 2 MG: 2 INJECTION INTRAMUSCULAR; INTRAVENOUS; SUBCUTANEOUS at 08:13

## 2018-11-06 RX ADMIN — CELECOXIB 200 MG: 100 CAPSULE ORAL at 17:19

## 2018-11-06 RX ADMIN — HYDROMORPHONE HYDROCHLORIDE 2 MG: 2 INJECTION INTRAMUSCULAR; INTRAVENOUS; SUBCUTANEOUS at 01:40

## 2018-11-06 RX ADMIN — WATER 1 G: 1 INJECTION INTRAMUSCULAR; INTRAVENOUS; SUBCUTANEOUS at 03:52

## 2018-11-06 RX ADMIN — DIPHENHYDRAMINE HYDROCHLORIDE 50 MG: 25 CAPSULE ORAL at 09:37

## 2018-11-06 RX ADMIN — DIPHENHYDRAMINE HYDROCHLORIDE 50 MG: 25 CAPSULE ORAL at 17:18

## 2018-11-06 RX ADMIN — OXYCODONE HYDROCHLORIDE 5 MG: 5 TABLET ORAL at 03:49

## 2018-11-06 RX ADMIN — OXYCODONE HYDROCHLORIDE 5 MG: 5 TABLET ORAL at 00:37

## 2018-11-06 RX ADMIN — CELECOXIB 200 MG: 100 CAPSULE ORAL at 08:16

## 2018-11-06 NOTE — PROGRESS NOTES
[facility] 
 
 
CARDIOLOGY PROGRESS NOTE 
RECS: 
1) post op evaluation - no active chest pain. 2) family h/o CAD 3. Abnormal EKG. No new changes postop and septal infarct criteria are not present anymore. Nonspecific T wave flattening is seen in lateral leads. 
  
Stable cardiac hemodynamics Continue present treatment Little more to add from the [facility] . We will sign off. Should the clinical stituation change or there be any need for further cardiac input please do not hesitate to contact us. ASSESSMENT: 
Hospital Problems  Date Reviewed: 11/5/2018 Codes Class Noted POA Humeral head fracture, right, open, initial encounter ICD-10-CM: S42.291B ICD-9-CM: 812.19  11/4/2018 Unknown SUBJECTIVE: 
No CP No SOB OBJECTIVE: 
 
VS:  
Visit Vitals /62 (BP 1 Location: Left arm, BP Patient Position: Sitting) Pulse 89 Temp 98.7 °F (37.1 °C) Resp 18 Wt 71.7 kg (158 lb) SpO2 93% BMI 26.29 kg/m² Intake/Output Summary (Last 24 hours) at 11/6/2018 1440 Last data filed at 11/6/2018 1126 Gross per 24 hour Intake 4089 ml Output 2500 ml Net 1589 ml  
 
TELE: normal sinus rhythm General: alert and in mild distress due to shoulder pain postoperatively. HENT: Normocephalic, atraumatic. Normal external eye. Neck :  no bruit, no JVD Cardiac:  regular rate and rhythm, S1, S2 normal, no murmur, click, rub or gallop Chest/Lungs:chest clear, no wheezing, rales, normal symmetric air entry Abdomen: Soft, nontender, no masses Extremities:  No c/c/edema, peripheral pulses present Labs: Results:  
   
Chemistry Recent Labs 11/04/18 
1827 *   
K 4.8  
 CO2 24 BUN 15  
CREA 0.74 CA 8.9 AGAP 13 BUCR 20 CBC w/Diff Recent Labs 11/06/18 
0104 11/04/18 
1827 WBC 12.0 13.2 RBC 3.36* 4.14* HGB 8.0* 9.9*  
HCT 26.4* 32.5*  
 405 GRANS  --  66  
LYMPH  --  24  
 EOS  --  1 Cardiac Enzymes No results for input(s): CPK, CKND1, ROB in the last 72 hours. No lab exists for component: [place] Car Coagulation Recent Labs 11/04/18 
1827 PTP 12.5 INR 1.0 Lipid Panel Lab Results Component Value Date/Time Cholesterol, total 229 (H) 12/03/2011 08:53 AM  
 HDL Cholesterol 82 (H) 12/03/2011 08:53 AM  
 LDL, calculated 134 (H) 12/03/2011 08:53 AM  
 VLDL, calculated 13 12/03/2011 08:53 AM  
 Triglyceride 65 12/03/2011 08:53 AM  
 CHOL/HDL Ratio 2.8 12/03/2011 08:53 AM  
  
BNP No results for input(s): BNPP in the last 72 hours. Liver Enzymes No results for input(s): TP, ALB, TBIL, AP, SGOT, GPT in the last 72 hours. No lab exists for component: DBIL Digoxin Thyroid Studies No results found for: T4, T3U, TSH, TSHEXT Andreina Lloyd MD   Pager # [phone]

## 2018-11-06 NOTE — ROUTINE PROCESS
Patient is alert and oriented times three with nos igns or symptoms of distress. Dressing is clean dry and intact and pulses palpable

## 2018-11-06 NOTE — PROGRESS NOTES
Per the pt's medical records, she is pending an x-ray of the R wrist today.  OT will await results of the x-ray, prior to completion of the OT eval.

## 2018-11-06 NOTE — ROUTINE PROCESS
Mobility Intervention:  
 
  [] Pt dangled at edge of bed 
  [] Pt assisted OOB to bedside commode 
  [] Pt assisted OOB to chair 
  [] Pt ambulated to bathroom [x] Patient was ambulated in room/hallway Assistive Device Utilized:  
  
 [x] Rolling walker 
 [] Crutches 
 [] Straight Cane 
 [] Knee immobilizer [] IV pole After Mobilization:  
 
[] Patient left in no apparent distress sitting up in chair 
[x] Patient left in no apparent distress in bed 
[x] Call bell left within reach [x] SCDs on & machine turned on 
[x] Ice applied 
[] RN notified 
[] Caregiver present 
[] Bed alarm activated Reason patient not mobilized:  
 
 [] Patient refused 
 [] Nausea/vomiting 
 [] Low blood pressure 
 [] Drowsy/lethargic Pain Rating:  
 
[] 0 [] 1 Assistive Device:       
[] 2 [] 3 [] 4 
[] 5 
[] 6 Assistive Device:       
[] 7 
[] 8 [x] 9 [] 10 Comments:

## 2018-11-06 NOTE — PROGRESS NOTES
Problem: Mobility Impaired (Adult and Pediatric) Goal: *Acute Goals and Plan of Care (Insert Text) Outcome: Resolved/Met Date Met: 11/06/18 
physical Therapy EVALUATION & Discharge Patient: Carlee Felipe (20 y.o. female) Date: 11/6/2018 Primary Diagnosis: Humeral head fracture, right, open, initial encounter Procedure(s) (LRB): 
RIGHT SHOULDER OPEN REDUCTION INTERNAL FIXATION/C-ARM/MULTI-LOCKING NAILS SYNTHES/POSSIBLE NERVE BLOCK (Right) 1 Day Post-Op Precautions: fall, Right UE in sling with mobility ASSESSMENT AND RECOMMENDATIONS: 
Based on the objective data described below, the patient presents at independent level with ambulation and transfers without an assistive device. Skilled physical therapy is not indicated at this time. Discharge Recommendations: None Further Equipment Recommendations for Discharge: N/A   
 
G-CODES:  
 
Mobility  Current  CI= 1-19%   Goal  CI= 1-19%  D/C  CI= 1-19%. The severity rating is based on the Level of Assistance required for Functional Mobility and ADLs. Eval Complexity: History: MEDIUM  Complexity : 1-2 comorbidities / personal factors will impact the outcome/ POC Exam:LOW Complexity : 1-2 Standardized tests and measures addressing body structure, function, activity limitation and / or participation in recreation  Presentation: LOW Complexity : Stable, uncomplicated  Clinical Decision Making:Low Complexity , Overall Complexity:LOW SUBJECTIVE:  
Patient stated I'm feeling better than the other day.  OBJECTIVE DATA SUMMARY:  
 
Past Medical History:  
Diagnosis Date  Bell's palsy  Cardiac nuclear imaging test, normal 07/13/2012 Low risk. No ischemia or prior infarction. No RWMA. EF 64%. Nondiagnostic EKG on EST. Ex time 10 min 41 sec.  Lump Palm of the left hand Past Surgical History:  
Procedure Laterality Date  HAND/FINGER SURGERY UNLISTED Left   
 cyst removal  
 HX APPENDECTOMY  HX CHOLECYSTECTOMY  HX GASTRIC BYPASS  HX HYSTERECTOMY  2008 Barriers to Learning/Limitations: None Compensate with: visual, verbal, tactile, kinesthetic cues/model Prior Level of Function/Home Situation: independent with mobility without an assistive device Home Situation Home Environment: Private residence One/Two Story Residence: One story Living Alone: No 
Support Systems: Family member(s), Friends \ neighbors Patient Expects to be Discharged to[de-identified] Private residence Current DME Used/Available at Home: NoneCritical Behavior: 
 calm and cooperative Strength:   
Strength: Within functional limits(B LEs) Tone & Sensation:  
Tone: Normal(B LEs) Range Of Motion: 
AROM: Within functional limits(B LEs) Functional Mobility: 
Transfers: 
Sit to Stand: Independent Stand to Sit: Independent Balance:  
Sitting: Intact Standing: IntactAmbulation/Gait Training: 
Distance (ft): 300 Feet (ft) Assistive Device: (none) Ambulation - Level of Assistance: Independent Pain: 
Pt reports 2/10 pain or discomfort prior to treatment.   
Pt reports 2/10 pain or discomfort post treatment. Activity Tolerance:  
good Please refer to the flowsheet for vital signs taken during this treatment. After treatment:  
[x]         Patient left in no apparent distress sitting up in chair 
[]         Patient left in no apparent distress in bed 
[x]         Call bell left within reach [x]         Nursing notified 
[]         Caregiver present 
[]         Bed alarm activated COMMUNICATION/EDUCATION:  
[x]         Fall prevention education was provided and the patient/caregiver indicated understanding. [x]         Patient/family have participated as able in goal setting and plan of care.-eval only 
[]         Patient/family agree to work toward stated goals and plan of care. []         Patient understands intent and goals of therapy, but is neutral about his/her participation. []         Patient is unable to participate in goal setting and plan of care. Educated patient on increasing activity throughout the day Thank you for this referral. 
Andrade Benedict, PT Time Calculation: 15 mins

## 2018-11-06 NOTE — ROUTINE PROCESS
Patient is alert and oriented times three with no signs or symptoms fo distress. Dressing is clean dry and intact and pulses palpable

## 2018-11-06 NOTE — PROGRESS NOTES
Bedside and Verbal shift change report given to Noy Gurrola RN (oncoming nurse) by Sandro Castro RN (offgoing nurse). Report included the following information SBAR, Kardex,  Intake/Output and MAR.

## 2018-11-06 NOTE — PROGRESS NOTES
[address] Progress Note Patient: Elizabeth El               Sex: female          DOA: 11/4/2018 YOB: 1960      Age:  59 y.o.        LOS:  LOS: 2 days S/P  Procedure(s): RIGHT SHOULDER OPEN REDUCTION INTERNAL FIXATION/C-ARM/MULTI-LOCKING NAILS SYNTHES/POSSIBLE NERVE BLOCK Subjective: Moderate pain, states most of her pain is in her wrist. Not sure if this is from fall or not Denies cp, sob, abd pain Objective:  
  
Blood pressure 114/67, pulse 72, temperature 98.6 °F (37 °C), resp. rate 15, weight 158 lb (71.7 kg), SpO2 93 %. Well developed, Well Nourished alert, cooperative, no distress Incision clean, dry, no drainage, dressing in place 
swelling and tenderness noted in right shoulder No swelling to right wrist 
ttp distal radius Full rom Sensory is intact Motor is intact 
nv intact 2+distal pulses Neg calf tenderness Labs: 
CBC 
@ 
CBC:  
Lab Results Component Value Date/Time WBC 12.0 11/06/2018 01:04 AM  
 RBC 3.36 (L) 11/06/2018 01:04 AM  
 HGB 8.0 (L) 11/06/2018 01:04 AM  
 HCT 26.4 (L) 11/06/2018 01:04 AM  
 PLATELET 381 55/68/0739 01:04 AM  
 
BMP:  
Lab Results Component Value Date/Time Glucose 101 (H) 11/04/2018 06:27 PM  
 Sodium 140 11/04/2018 06:27 PM  
 Potassium 4.8 11/04/2018 06:27 PM  
 Chloride 103 11/04/2018 06:27 PM  
 CO2 24 11/04/2018 06:27 PM  
 BUN 15 11/04/2018 06:27 PM  
 Creatinine 0.74 11/04/2018 06:27 PM  
 Calcium 8.9 11/04/2018 06:27 PM  
@ Coagulation Lab Results Component Value Date INR 1.0 11/04/2018 Basic Metabolic Profile Lab Results Component Value Date  11/04/2018 CO2 24 11/04/2018 BUN 15 11/04/2018 Medications Reviewed Assessment/Plan Active Problems: 
  Humeral head fracture, right, open, initial encounter (11/4/2018) Procedure(s): RIGHT SHOULDER OPEN REDUCTION INTERNAL FIXATION/C-ARM/MULTI-LOCKING NAILS SYNTHES/POSSIBLE NERVE BLOCK :  
 
1. PT and/or OT Consults: YES continue PT/OT: oob to chair, gentle rom 2. Incision Care:  Routine Incision Care and Dressing Changes as necessary: dressing changes POD#2 and as needed 3. Pain control 4. Out of bed and SCDs for DVT prophylaxis 5. Likely d/c to home with home health tomorrow 6. benedryl for itching today 7. Xray right wrist today 4. DISCHARGE PLANNING  Intervention : home with home health tomorrow [facility] 
[phone]

## 2018-11-06 NOTE — OP NOTES
[facility] REPORT    Name:Nela MORRIS  MR#: [id number]  : 1960  ACCOUNT #: [de-identified]   DATE OF SERVICE: 2018    PREOPERATIVE DIAGNOSIS:  Displaced proximal humerus fracture, right shoulder. POSTOPERATIVE DIAGNOSIS:  Displaced proximal humerus fracture, right shoulder. PROCEDURE PERFORMED:  Open reduction intramedullary rodding with a short proximal humeral nail, right proximal humerus fracture, spiral blade. SURGEON:  Reed Whitten MD    ASSISTANT:  Kishor David    ESTIMATED BLOOD LOSS:  Minimal.    COMPLICATIONS:  None. SPECIMENS REMOVED:  None. FINDINGS:  As above, a 3-part proximal humerus fracture. IMPLANTS:  Per brief op note. ANESTHESIA:  General.    BRIEF HISTORY:  Patient has had a significant amount of problems since a fall, was consented for surgery and plan discussed at length, possible risks and complications of surgery including infection, bleeding, recurrence of pain, among other possible problems. PROCEDURE IN DETAIL:  The patient was taken to the operating room, placed under general endotracheal anesthesia with the anesthesia staff, placed on a radiolucent table with a wedge under her right shoulder, prepped with ChloraPrep solution and draped as a free sterile field. An incision over the anterolateral aspect of the acromion was made. Subcutaneous tissues dissected bluntly to the level of the deltoid fascia, which was split. The rotator cuff, a 2 mm rent was placed and a guide pin was placed at the edge of the articular surface and the apex on the lateral view. The fracture was reduced. A reamer was then used and a 10 mm humeral nail was placed across the fracture site into the shaft, affecting an excellent position.   Through the targeting arm, a trocar was placed and through a stab incision, the guidepin for the spiral blade was then placed just inferior to the equator of the humeral head for which a 40 mm spiral blade was impacted into place after going through a lateral reamer. Through a 1 inch incision distal, a trocar was placed for 2 screws on the shaft that were drilled and two 24 mm screws were placed through the targeting device. The 0 end cap was then placed locking the spiral blade. Very stable construct achieved in this manner. Irrigation was then used. The rotator cuff deep fascia was closed with 0 Vicryl, subcutaneous tissues with 2-0 Vicryl, the skin with 4-0 Monocryl. Sterile dressings were applied. Patient tolerated the procedure well and was taken to recovery room without problems.       MD PRASAD Rajput / Micki.[facility]  D: 11/05/2018 16:15     T: 11/06/2018 02:32  JOB #: [id number]

## 2018-11-06 NOTE — PROGRESS NOTES
Referral sent to [facility]. Reviewed chart. Discharge plan is home with home health. CM will continue to monitor for transitional needs. SHANE PerezN, [phone]

## 2018-11-06 NOTE — ROUTINE PROCESS
Patient is alert and oriented times three with no signs or symptoms of distress. Dressing is clean dry and intact and pulses palpable

## 2018-11-06 NOTE — ROUTINE PROCESS
SBAR/Bedside report given to [facility], [facility]. AND [facility].

## 2018-11-06 NOTE — HOME CARE
Rec HC order - pt has no dme needs at this time [facility] will follow per Dr. Ashlee Savage orders - D Jyoti DAVIS

## 2018-11-06 NOTE — PROGRESS NOTES
Problem: Self Care Deficits Care Plan (Adult) Goal: *Acute Goals and Plan of Care (Insert Text) Outcome: Resolved/Met Date Met: 11/06/18 Occupational Therapy EVALUATION/discharge Patient: Aretha Fisher (65 y.o. female) Date: 11/6/2018 Primary Diagnosis: Humeral head fracture, right, open, initial encounter Procedure(s) (LRB): 
RIGHT SHOULDER OPEN REDUCTION INTERNAL FIXATION/C-ARM/MULTI-LOCKING NAILS SYNTHES/POSSIBLE NERVE BLOCK (Right) 1 Day Post-Op *Precautions:   (Gentle PROM only right shoulder, AROM RUE elbow & wrist, no RUE external rotation, okay to remove RUE sling in bed) ASSESSMENT AND RECOMMENDATIONS: 
Mrs. Deborah Eisenberg is a pleasant 59 yr old female admitted to the hospital 11-4-18 after a mechanical fall. Pt was found to have a R displaced proximal humerus fracture and is status-post an ORIF of the R shoulder on 11-5-18. During the OT eval today, the pt completed sit to stand independently, and she required supervision for lower body dressing, min assist for upper body dressing, and modified independence for toileting. OT educated the pt on the above listed precautions, as well as the jammie-technique for upper body dressing, use of loose fitting clothes for ease with dressing tasks, one handed technique for lower body dressing, and no pushing/pulling with RUE. OT clarified to pt that R wrist AROM is okay only if she is informed there is not acute wrist fracture (x-ray results still pending); if there is an acute fracture, the pt should be informed of any activity and/or ROM limitations by medical personnel. The pt presented with good understanding, teach back abilities, and recall of info and skills taught. As such, she does not require further OT services in the hospital setting.  OT will sign off and recommend the pt return home with her spouse at discharge, with resumption of outpatient upper extremity rehab (pt started outpatient therapy after she had a R rotator cuff repair ~6 weeks ago). Skilled occupational therapy is not indicated at this time. Discharge Recommendations: Outpatient Further Equipment Recommendations for Discharge: N/A Barriers to Learning/Limitations: None Compensate with: visual, verbal, tactile, kinesthetic cues/model COMPLEXITY Eval Complexity: History: LOW Complexity : Brief history review ; Examination: LOW Complexity : 1-3 performance deficits relating to physical, cognitive , or psychosocial skils that result in activity limitations and / or participation restrictions ; Decision Making:LOW Complexity : No comorbidities that affect functional and no verbal or physical assistance needed to complete eval tasks  Assessment: Low Complexity G-CODES:  
 
Self Care  Current  CJ= 20-39%  Goal  CJ= 20-39%  D/C  CJ= 20-39%. The severity rating is based on the Level of Assistance required for Functional Mobility and ADLs. SUBJECTIVE:  
Patient was agreeable to OT eval. 
 
OBJECTIVE DATA SUMMARY:  
 
Past Medical History:  
Diagnosis Date  Bell's palsy  Cardiac nuclear imaging test, normal 07/13/2012 Low risk. No ischemia or prior infarction. No RWMA. EF 64%. Nondiagnostic EKG on EST. Ex time 10 min 41 sec.  Lump Palm of the left hand Past Surgical History:  
Procedure Laterality Date  HAND/FINGER SURGERY UNLISTED Left   
 cyst removal  
 HX APPENDECTOMY  HX CHOLECYSTECTOMY  HX GASTRIC BYPASS  HX HYSTERECTOMY  2008 Prior Level of Function/Home Situation: Prior to a R rotator cuff repair ~6 weeks ago, the pt was independent with ADLs, driving, working, and household chores. Since her surgery, she had been independent with ambulation, however her spouse provided min assist with dressing & bathing tasks. Home Situation Home Environment: Private residence One/Two Story Residence: One story Living Alone: No(Lives with spouse.) Support Systems: Family member(s) Patient Expects to be Discharged to[de-identified] Private residence Current DME Used/Available at Home: (hand held showerhead) []     Right hand dominant   []     Left hand dominantCognitive/Behavioral Status: 
Neurologic State: Alert Orientation Level: Oriented X4 Cognition: Appropriate decision making; Appropriate for age attention/concentration; Appropriate safety awareness; Follows commands Safety/Judgement: Good awareness of safety precautions Vision/Perceptual:   
    
       
Acuity: [facility]) Balance: 
Sitting: Intact Standing: Intact Strength: 
Strength: Within functional limits(BLE and LUE; RUE not formally assessed due to precautions/restrictions) Tone & Sensation: 
Tone: Normal(BUE and BLE) Range of Motion: 
ROM: AROM within functional limits for BUE & BLE, except R shoulder ROM not assessed due to no AROM restriction) Transfers: 
Sit to Stand: Independent ADL Assessment: 
Feeding: Setup Oral Facial Hygiene/Grooming: Moderate assistance(for contralateral grooming tasks, given R shoulder ROM limitations) Bathing: Minimum assistance Upper Body Dressing: Minimum assistance Lower Body Dressing: Supervision Toileting: Supervision Cognitive Retraining Safety/Judgement: Good awareness of safety precautions Pain: 
Pt reports 7/10 pain or discomfort prior to treatment.   
Pt reports 7/10 pain or discomfort post treatment. Activity Tolerance:  
good Please refer to the flowsheet for vital signs taken during this treatment. After treatment:  
[x]  Patient left in no apparent distress sitting up in chair 
[]  Patient left in no apparent distress in bed 
[x]  Call bell left within reach 
[]  Nursing notified 
[]  Caregiver present 
[]  Bed alarm activated COMMUNICATION/EDUCATION:  
Communication/Collaboration: 
[x]      Home safety education was provided and the patient/caregiver indicated understanding. [x]      Patient/family have participated as able and agree with findings and recommendations. []      Patient is unable to participate in plan of care at this time. Faizan Penn, OT Time Calculation: 15 mins

## 2018-11-07 ENCOUNTER — APPOINTMENT (OUTPATIENT)
Dept: PHYSICAL THERAPY | Age: 58
End: 2018-11-07
Payer: COMMERCIAL

## 2018-11-07 VITALS
TEMPERATURE: 98.3 F | SYSTOLIC BLOOD PRESSURE: 122 MMHG | OXYGEN SATURATION: 96 % | RESPIRATION RATE: 17 BRPM | WEIGHT: 158 LBS | BODY MASS INDEX: 26.29 KG/M2 | HEART RATE: 93 BPM | DIASTOLIC BLOOD PRESSURE: 62 MMHG

## 2018-11-07 LAB
ERYTHROCYTE [DISTWIDTH] IN BLOOD BY AUTOMATED COUNT: 17.4 % (ref 11.6–14.5)
HCT VFR BLD AUTO: 24 % (ref 35–45)
HGB BLD-MCNC: 7.6 G/DL (ref 12–16)
MCH RBC QN AUTO: 25.2 PG (ref 24–34)
MCHC RBC AUTO-ENTMCNC: 31.7 G/DL (ref 31–37)
MCV RBC AUTO: 79.5 FL (ref 74–97)
PLATELET # BLD AUTO: 304 K/UL (ref 135–420)
PMV BLD AUTO: 10.7 FL (ref 9.2–11.8)
RBC # BLD AUTO: 3.02 M/UL (ref 4.2–5.3)
WBC # BLD AUTO: 10.8 K/UL (ref 4.6–13.2)

## 2018-11-07 PROCEDURE — 85027 COMPLETE CBC AUTOMATED: CPT | Performed by: PHYSICIAN ASSISTANT

## 2018-11-07 PROCEDURE — 36415 COLL VENOUS BLD VENIPUNCTURE: CPT | Performed by: PHYSICIAN ASSISTANT

## 2018-11-07 PROCEDURE — 74011250636 HC RX REV CODE- 250/636: Performed by: ORTHOPAEDIC SURGERY

## 2018-11-07 PROCEDURE — 77030012935 HC DRSG AQUACEL BMS -B

## 2018-11-07 PROCEDURE — 74011250637 HC RX REV CODE- 250/637: Performed by: PHYSICIAN ASSISTANT

## 2018-11-07 RX ORDER — HYDROMORPHONE HYDROCHLORIDE 2 MG/1
2 TABLET ORAL
Qty: 40 TAB | Refills: 0 | Status: SHIPPED | OUTPATIENT
Start: 2018-11-07 | End: 2022-08-23

## 2018-11-07 RX ADMIN — SENNOSIDES AND DOCUSATE SODIUM 1 TABLET: 8.6; 5 TABLET ORAL at 08:53

## 2018-11-07 RX ADMIN — HYDROMORPHONE HYDROCHLORIDE 2 MG: 2 INJECTION INTRAMUSCULAR; INTRAVENOUS; SUBCUTANEOUS at 06:44

## 2018-11-07 RX ADMIN — ACETAMINOPHEN 1000 MG: 500 TABLET ORAL at 06:36

## 2018-11-07 RX ADMIN — OXYCODONE HYDROCHLORIDE 5 MG: 5 TABLET ORAL at 08:53

## 2018-11-07 RX ADMIN — CELECOXIB 200 MG: 100 CAPSULE ORAL at 08:53

## 2018-11-07 NOTE — PROGRESS NOTES
Pt discharged home with home health via private vehicle and accompanied by . Pt VS WNL. No distress noted. Armbands removed and shredded. Pt given instructions and opportunity for questions was provided.  picked up prescriptions from pharmacy.

## 2018-11-07 NOTE — ROUTINE PROCESS
Pt frequently up in room independently, steady gait noted. Reports RUE pain, prn meds given x'1 with complete relief reported. Currently the pt is awake and denies needs at this time. Will continue to monitor.

## 2018-11-07 NOTE — PROGRESS NOTES
Discharge order noted for today. Pt has been accepted to [facility]. Met with pt and agreeable to the transition plan today. Transport has been arranged with . P'ts discharge [facility] orders have been forwarded to [address]. Duglas Urbina, SHANEN, [phone]

## 2018-11-07 NOTE — PROGRESS NOTES
conducted an initial consultation and Spiritual Assessment for Chelsea Schuster, who is a 59 y.o.,female. Patients Primary Language is: [place]. According to the patients EMR Confucianist Affiliation is: [facility].  
 
The reason the Patient came to the hospital is:  
Patient Active Problem List  
 Diagnosis Date Noted  Humeral head fracture, right, open, initial encounter 11/04/2018 The  provided the following Interventions: 
Initiated a relationship of care and support. Provided information about Spiritual Care Services. Chart reviewed. The following outcomes where achieved: 
Patient expressed gratitude for 's visit. Assessment: 
Patient does not have any Zoroastrianism/cultural needs that will affect patients preferences in health care. There are no spiritual or Zoroastrianism issues which require intervention at this time. Plan: 
Chaplains will continue to follow and will provide pastoral care on an as needed/requested basis.  recommends bedside caregivers page  on duty if patient shows signs of acute spiritual or emotional distress. [facility] 
[phone])

## 2018-11-07 NOTE — DISCHARGE SUMMARY
Discharge Summary    Patient: Daya Pinedo               Sex: female          DOA: 11/4/2018         YOB: 1960      Age:  59 y.o.        LOS:  LOS: 3 days                Admit Date: 11/4/2018    Discharge Date: 11/7/2018    Admission Diagnoses: Humeral head fracture, right, open, initial encounter    Discharge Diagnoses:    Problem List as of 11/7/2018 Date Reviewed: 11/5/2018          Codes Class Noted - Resolved    Humeral head fracture, right, open, initial encounter ICD-10-CM: S36.46B  ICD-9-CM: 812.19  11/4/2018 - Present              Discharge Condition: Good    Discharge Destination: Home with [place] [id number] Course: 59 y.o. female who was admitted s/p fall with right proximal humerus fracture. Underwent ORIF of right humerus without complications. Transferred up to floor for IV prophylactic antibiotics, pain control and PT/OT. Complained of right wrist pain post operatively. Xray revealing healed old distal radius fracture. No acute fractures noted. Pain controlled post op day #2. patient verbalized readiness to be discharged to home with home health POD#2. She was consulted by cardiology pre operatively for cardiac clearance. Consults: Cardiology    Significant Diagnostic Studies: labs: CBC - minor acute blood loss anemia    Discharge Medications:   See AVS    Activity: No heavy lifting, pushing, pulling with the implant side for 2 months, No driving while on analgesics, PT/OT per Home Health and See surgical instructions    Diet: Regular Diet    Wound Care: Keep wound clean and dry, Reinforce dressing PRN and Ice to area for comfort    Follow-up: 1 week with ORTHO.  14 days with PCP      [facility]

## 2018-11-08 ENCOUNTER — TELEPHONE (OUTPATIENT)
Dept: ORTHOPEDIC SURGERY | Age: 58
End: 2018-11-08

## 2018-11-08 ENCOUNTER — HOME CARE VISIT (OUTPATIENT)
Dept: SCHEDULING | Facility: HOME HEALTH | Age: 58
End: 2018-11-08
Payer: COMMERCIAL

## 2018-11-08 VITALS
OXYGEN SATURATION: 98 % | TEMPERATURE: 96.8 F | HEART RATE: 78 BPM | DIASTOLIC BLOOD PRESSURE: 88 MMHG | SYSTOLIC BLOOD PRESSURE: 122 MMHG

## 2018-11-08 PROCEDURE — 400013 HH SOC

## 2018-11-08 PROCEDURE — G0151 HHCP-SERV OF PT,EA 15 MIN: HCPCS

## 2018-11-09 ENCOUNTER — HOME CARE VISIT (OUTPATIENT)
Dept: HOME HEALTH SERVICES | Facility: HOME HEALTH | Age: 58
End: 2018-11-09
Payer: COMMERCIAL

## 2018-11-09 ENCOUNTER — TELEPHONE (OUTPATIENT)
Dept: ORTHOPEDIC SURGERY | Age: 58
End: 2018-11-09

## 2018-11-09 DIAGNOSIS — S42.291D OTHER CLOSED DISPLACED FRACTURE OF PROXIMAL END OF RIGHT HUMERUS WITH ROUTINE HEALING, SUBSEQUENT ENCOUNTER: Primary | ICD-10-CM

## 2018-11-09 RX ORDER — HYDROCODONE BITARTRATE AND ACETAMINOPHEN 10; 325 MG/1; MG/1
1 TABLET ORAL
Qty: 40 TAB | Refills: 0 | Status: SHIPPED | OUTPATIENT
Start: 2018-11-09 | End: 2019-01-24 | Stop reason: SDUPTHER

## 2018-11-09 NOTE — TELEPHONE ENCOUNTER
The patient reports Dilaudid is too strong. She requests a prescription for Roxicodone 5 mg tabs (medication prescribed to treat post op pain in September). Please advise. Date of Surgery: 11/05/2018 Right Shoulder ORIF Last Visit: 10/16/2018 with MENA Roberson Next Appointment: 11/15/2018 with MENA Roberson

## 2018-11-09 NOTE — TELEPHONE ENCOUNTER
Spoke with patient and informed her that her rx is ready to be picked up at the [facility] location.

## 2018-11-10 ENCOUNTER — HOME CARE VISIT (OUTPATIENT)
Dept: SCHEDULING | Facility: HOME HEALTH | Age: 58
End: 2018-11-10
Payer: COMMERCIAL

## 2018-11-10 PROCEDURE — G0157 HHC PT ASSISTANT EA 15: HCPCS

## 2018-11-11 VITALS
DIASTOLIC BLOOD PRESSURE: 72 MMHG | HEART RATE: 76 BPM | TEMPERATURE: 98.1 F | SYSTOLIC BLOOD PRESSURE: 120 MMHG | OXYGEN SATURATION: 99 %

## 2018-11-12 ENCOUNTER — APPOINTMENT (OUTPATIENT)
Dept: PHYSICAL THERAPY | Age: 58
End: 2018-11-12
Payer: COMMERCIAL

## 2018-11-13 ENCOUNTER — HOME CARE VISIT (OUTPATIENT)
Dept: SCHEDULING | Facility: HOME HEALTH | Age: 58
End: 2018-11-13
Payer: COMMERCIAL

## 2018-11-13 PROCEDURE — G0157 HHC PT ASSISTANT EA 15: HCPCS

## 2018-11-13 NOTE — PROGRESS NOTES
In [address]  [place] [address] [place], [address] 
[phone] fax Physical Therapy Discharge Summary Patient name: Carlee Felipe Start of Care: 18 Referral source: Ana Laura Alcantara,* : 1960 Medical/Treatment Diagnosis: Complete rotator cuff tear or rupture of right shoulder, not specified as traumatic [M75.121] Onset Date:18 
   
Prior Hospitalization: see medical history Provider#: [id number] Medications: Verified on Patient Summary List    
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 
   
Visits from [facility] of Care: 11    Missed Visits: 0 Reporting Period : 10/16/2018 to 2018 Summary of Care: 
DC PT after pt fell and fractured proximal humerus requiring another surgery. Pt was not reassessed properly for discharge. ASSESSMENT/RECOMMENDATIONS: 
[x]Discontinue therapy: []Patient has reached or is progressing toward set goals []Patient is non-compliant or has abdicated 
    [x]Due to lack of appreciable progress towards set goals Rashi Henry 2018 1:52 PM

## 2018-11-14 ENCOUNTER — HOME CARE VISIT (OUTPATIENT)
Dept: SCHEDULING | Facility: HOME HEALTH | Age: 58
End: 2018-11-14
Payer: COMMERCIAL

## 2018-11-14 ENCOUNTER — APPOINTMENT (OUTPATIENT)
Dept: PHYSICAL THERAPY | Age: 58
End: 2018-11-14
Payer: COMMERCIAL

## 2018-11-14 VITALS
DIASTOLIC BLOOD PRESSURE: 74 MMHG | SYSTOLIC BLOOD PRESSURE: 132 MMHG | TEMPERATURE: 98.8 F | OXYGEN SATURATION: 98 % | HEART RATE: 85 BPM

## 2018-11-14 VITALS
HEART RATE: 75 BPM | SYSTOLIC BLOOD PRESSURE: 122 MMHG | TEMPERATURE: 98.1 F | DIASTOLIC BLOOD PRESSURE: 76 MMHG | OXYGEN SATURATION: 99 %

## 2018-11-14 PROCEDURE — G0157 HHC PT ASSISTANT EA 15: HCPCS

## 2018-11-15 ENCOUNTER — HOME CARE VISIT (OUTPATIENT)
Dept: SCHEDULING | Facility: HOME HEALTH | Age: 58
End: 2018-11-15
Payer: COMMERCIAL

## 2018-11-15 ENCOUNTER — OFFICE VISIT (OUTPATIENT)
Dept: ORTHOPEDIC SURGERY | Age: 58
End: 2018-11-15

## 2018-11-15 VITALS
TEMPERATURE: 98 F | BODY MASS INDEX: 26.99 KG/M2 | WEIGHT: 162 LBS | HEIGHT: 65 IN | RESPIRATION RATE: 16 BRPM | HEART RATE: 84 BPM | DIASTOLIC BLOOD PRESSURE: 58 MMHG | OXYGEN SATURATION: 97 % | SYSTOLIC BLOOD PRESSURE: 113 MMHG

## 2018-11-15 DIAGNOSIS — M25.511 RIGHT SHOULDER PAIN, UNSPECIFIED CHRONICITY: Primary | ICD-10-CM

## 2018-11-15 DIAGNOSIS — S42.291D OTHER CLOSED DISPLACED FRACTURE OF PROXIMAL END OF RIGHT HUMERUS WITH ROUTINE HEALING, SUBSEQUENT ENCOUNTER: ICD-10-CM

## 2018-11-15 PROCEDURE — G0151 HHCP-SERV OF PT,EA 15 MIN: HCPCS

## 2018-11-15 NOTE — PROGRESS NOTES
Asmita Carolina 1960 HISTORY OF PRESENT ILLNESS Asmita Carolina is a 59 y.o. female who presents today for evaluation s/p ORIF Right humerus on 18. She is currently in her sling. Home health is coming to her house currently for PT but she believes they are discharging her today. Patient pain is a 3/10. Patient denies any fever, chills, chest pain, shortness of breath or calf pain. The remainder of the review of systems is negative. There are no new illness or injuries to report since last seen in the office. No changes in medications, allergies, social or family history. PHYSICAL EXAM:  
Visit Vitals /58 Pulse 84 Temp 98 °F (36.7 °C) (Oral) Resp 16 Ht 5' 5\" (1.651 m) Wt 162 lb (73.5 kg) SpO2 97% BMI 26.96 kg/m² The patient is a well-developed, well-nourished female in no acute distress. The patient is alert and oriented times three. The patient appears to be well groomed. Mood and affect are normal. 
ORTHOPEDIC EXAM of right shoulder: Inspection: (+) swelling, (+) bruising Incision, clean, dry, intact, sutures in place Range of motion: 0-30 degrees passive glenohumeral abduction 
ttp ant lateral aspect of shoulder Stability: Stable Strength: n/a Imagin view xray images of right humerus on 11/15/2018 read and reviewed by myself reveal reduced proximal humerus fracture with hardware in place. No callus formation IMPRESSION:   
  ICD-10-CM ICD-9-CM 1. Right shoulder pain, unspecified chronicity M25.511 719.41 AMB POC XRAY; HUMERUS, 2+ VIEWS 2. Other closed displaced fracture of proximal end of right humerus with routine healing, subsequent encounter S42.291D V54.11 PLAN:  
1. Patient is doing well post operatively 2. Incisions cleaned 3. Will set up with outpatient PT. PROM x 6 total weeks until callus is seen 4. Stressed no increases in pain 5. No driving x 3 weeks Patient seen and evaluated by Dr. Zac Beltran today who agrees with treatment plan Follow-up Disposition: Not on File [facility]

## 2018-11-16 VITALS
DIASTOLIC BLOOD PRESSURE: 70 MMHG | OXYGEN SATURATION: 98 % | HEART RATE: 84 BPM | SYSTOLIC BLOOD PRESSURE: 120 MMHG | TEMPERATURE: 97 F

## 2018-11-28 ENCOUNTER — HOSPITAL ENCOUNTER (OUTPATIENT)
Dept: PHYSICAL THERAPY | Age: 58
Discharge: HOME | End: 2018-11-28
Payer: COMMERCIAL

## 2018-11-28 PROCEDURE — 97016 VASOPNEUMATIC DEVICE THERAPY: CPT

## 2018-11-28 PROCEDURE — 97161 PT EVAL LOW COMPLEX 20 MIN: CPT

## 2018-11-28 PROCEDURE — 97110 THERAPEUTIC EXERCISES: CPT

## 2018-11-28 NOTE — PROGRESS NOTES
In [address]  [place] [address] [place], [address] 
[phone] fax Plan of Care/ Statement of Necessity for Physical Therapy Services Patient name: Kody Grant Start of Care: 2018 Referral source: Harshad Castro [place] : 1960 Medical Diagnosis: Other displaced fracture of upper end of right humerus, sequela [S42.291S] Payor: Khadijah Gómez / Plan:  [facility] / Product Type: PPO /  Onset Date:18 Treatment Diagnosis: s/p right proximal humeral ORIF Prior Hospitalization: see medical history Provider#: [id number] Medications: Verified on Patient summary List  
 Comorbidities: right RTC repair Prior Level of Function:Independent with all ADLs and daily activities; left handed. The Plan of Care and following information is based on the information from the initial evaluation. Assessment/ key information: 60 y/o female presents s/p right shoulder ORIF as noted above. She had previous RTC repair in September and fell suffering a humeral fracture in November. She demonstrates significant PROM restrictions of the right shoulder as follows: flexion 75 degrees, IR 40 degrees, ER 10 degrees. Elbow, wrist and hand AROM WFL. Incisions appear to be healing, middle incision with some scab present. Pt will benefit from PT to improve aforementioned impairments. Evaluation Complexity History MEDIUM  Complexity : 1-2 comorbidities / personal factors will impact the outcome/ POC ; Examination MEDIUM Complexity : 3 Standardized tests and measures addressing body structure, function, activity limitation and / or participation in recreation  ;Presentation LOW Complexity : Stable, uncomplicated  ;Clinical Decision Making MEDIUM Complexity : FOTO score of 26-74 Overall Complexity Rating: LOW Problem List: pain affecting function, decrease ROM, decrease strength, decrease ADL/ functional abilitiies, decrease activity tolerance and decrease flexibility/ joint mobility Treatment Plan may include any combination of the following: Therapeutic exercise, Therapeutic activities, Neuromuscular re-education, Physical agent/modality, Manual therapy, Patient education and Self Care training Patient / Family readiness to learn indicated by: asking questions, trying to perform skills and interest 
Persons(s) to be included in education: patient (P) Barriers to Learning/Limitations: None Patient Goal (s): To get back use of my arm Patient Self Reported Health Status: good Rehabilitation Potential: good Short Term Goals: To be accomplished in 1 weeks: 1. Pt will be I and compliant with HEP Long Term Goals: To be accomplished in 4-8 weeks: 1. Improve FOTO to 66 to improve ability for functional tasks 2. Improve PROM scaption of the right shoulder to 140 degrees to improve future use of daily tasks 3. Pt will report being able to flush the toiliet without difficulty. 4. Improve PROM right shoulder ER to 45 degrees for improved ability for daily tasks. Frequency / Duration: Patient to be seen 2-3 times per week for 4-8 weeks. Patient/ Caregiver education and instruction: Diagnosis, prognosis, self care, activity modification, brace/ splint application and exercises 
 [x]  Plan of care has been reviewed with ABILIO Garcia, PT 11/28/2018 5:10 PM 
 
________________________________________________________________________ I certify that the above Therapy Services are being furnished while the patient is under my care. I agree with the treatment plan and certify that this therapy is necessary. [de-identified] Signature:____________Date:_________TIME:________ 
 
[employer], Date and Time must be completed for valid certification ** Please sign and return to [address] [place] [address] [facility], [address] [phone] fax

## 2018-11-28 NOTE — PROGRESS NOTES
PT DAILY TREATMENT NOTE 10-18 Patient Name: Katerine Chico Date:2018 : 1960 [x]  Patient  Verified Payor: BLUE CROSS / Plan: [facility] / Product Type: PPO / In time:5:00  Out time:5:52 Total Treatment Time (min): 52 Visit #: 1 of 8 Medicare/BCBS Only Total Timed Codes (min):  8 1:1 Treatment Time:  43 Treatment Area: Other displaced fracture of upper end of right humerus, sequela [S42.976S] SUBJECTIVE Pain Level (0-10 scale): 5 Any medication changes, allergies to medications, adverse drug reactions, diagnosis change, or new procedure performed?: [x] No    [] Yes (see summary sheet for update) Subjective functional status/changes:   [] No changes reported OBJECTIVE Modality rationale: decrease inflammation and decrease pain to improve the patients ability to perform ADL Min Type Additional Details  
 [] Estim:  []Unatt       []IFC  []Premod []Other:  []w/ice   []w/heat Position: Location:  
 [] Estim: []Att    []TENS instruct  []NMES []Other:  []w/US   []w/ice   []w/heat Position: Location:  
 []  Traction: [] Cervical       []Lumbar 
                     [] Prone          []Supine []Intermittent   []Continuous Lbs: 
[] before manual 
[] after manual  
 []  Ultrasound: []Continuous   [] Pulsed []1MHz   []3MHz W/cm2: 
Location:  
 []  Iontophoresis with dexamethasone Location: [] Take home patch  
[] In clinic  
 []  Ice     []  heat 
[]  Ice massage 
[]  Laser  
[]  Anodyne Position: Location:  
 []  Laser with stim 
[]  Other:  Position: Location:  
10 [x]  Vasopneumatic Device Pressure:       [x] lo [] med [] hi  
Temperature: [x] lo [] med [] hi  
[] Skin assessment post-treatment:  []intact []redness- no adverse reaction 
  []redness  adverse reaction:  
 
24 min [x]Eval                  []Re-Eval  
 
 
 18 min Therapeutic Exercise:  [] See flow sheet : HEP Rationale: increase ROM to improve the patients ability to perform ADL With 
 [x] TE 
 [] TA 
 [] neuro 
 [] other: Patient Education: [x] Review HEP [] Progressed/Changed HEP based on:  
[] positioning   [] body mechanics   [] transfers   [x] heat/ice application   
[] other: precautions Other Objective/Functional Measures:    
 
Pain Level (0-10 scale) post treatment: 4 
 
ASSESSMENT/Changes in Function:   
 
Patient will continue to benefit from skilled PT services to modify and progress therapeutic interventions, address functional mobility deficits, address ROM deficits, address strength deficits, analyze and address soft tissue restrictions, analyze and cue movement patterns and assess and modify postural abnormalities to attain remaining goals. [x]  See Plan of Care 
[]  See progress note/recertification 
[]  See Discharge Summary Progress towards goals / Updated goals: 
Per POC PLAN 
[]  Upgrade activities as tolerated     []  Continue plan of care 
[]  Update interventions per flow sheet      
[]  Discharge due to:_ 
[]  Other:_ Margie Brooks, PT 11/28/2018  5:09 PM 
 
Future Appointments Date Time Provider Bryson Maxwell 12/6/2018  9:00 AM Laura Magana PA [address]

## 2018-12-03 ENCOUNTER — HOSPITAL ENCOUNTER (OUTPATIENT)
Dept: PHYSICAL THERAPY | Age: 58
Discharge: HOME | End: 2018-12-03
Payer: COMMERCIAL

## 2018-12-03 PROCEDURE — 97016 VASOPNEUMATIC DEVICE THERAPY: CPT

## 2018-12-03 PROCEDURE — 97110 THERAPEUTIC EXERCISES: CPT

## 2018-12-03 NOTE — PROGRESS NOTES
PT DAILY TREATMENT NOTE 10-18 Patient Name: Lanette Baumgarten Date:12/3/2018 : 1960 [x]  Patient  Verified Payor: BLUE CROSS / Plan: [facility] / Product Type: PPO / In time:4:50  Out time:5:26 Total Treatment Time (min): 36 Visit #: 2 of 8 Medicare/BCBS Only Total Timed Codes (min):  26 1:1 Treatment Time:  36  
 
 
Treatment Area: Other displaced fracture of upper end of right humerus, subsequent encounter for fracture with routine healing [S42.291D] SUBJECTIVE Pain Level (0-10 scale): 8 Any medication changes, allergies to medications, adverse drug reactions, diagnosis change, or new procedure performed?: [x] No    [] Yes (see summary sheet for update) Subjective functional status/changes:   [] No changes reported \"My shoulder pain is unbearable today, I dont even know if I can do anything today\". Patient states she had a bad weekend with pain and all day today, however states she remains compliant with HEP. Patient arrived to appointment 20 minutes late. OBJECTIVE Modality rationale: decrease edema, decrease inflammation and decrease pain to improve the patients ability to perform functional activities with decreased pain. Min Type Additional Details  
 [] Estim:  []Unatt       []IFC  []Premod []Other:  []w/ice   []w/heat Position: Location:  
 [] Estim: []Att    []TENS instruct  []NMES []Other:  []w/US   []w/ice   []w/heat Position: Location:  
 []  Traction: [] Cervical       []Lumbar 
                     [] Prone          []Supine []Intermittent   []Continuous Lbs: 
[] before manual 
[] after manual  
 []  Ultrasound: []Continuous   [] Pulsed []1MHz   []3MHz W/cm2: 
Location:  
 []  Iontophoresis with dexamethasone Location: [] Take home patch  
[] In clinic  
 []  Ice     []  heat 
[]  Ice massage 
[]  Laser  
[]  Anodyne Position: Location: []  Laser with stim 
[]  Other:  Position: Location:  
10 [x]  Vasopneumatic Device Pressure:       [x] lo [] med [] hi  
Temperature: [x] lo [] med [] hi  
[x] Skin assessment post-treatment:  [x]intact []redness- no adverse reaction 
  []redness  adverse reaction:  
 
26 min Therapeutic Exercise:  [x] See flow sheet :  
Rationale: increase ROM and increase strength to improve the patients ability to perform ADLs. With 
 [x] TE 
 [] TA 
 [] neuro 
 [] other: Patient Education: [x] Review HEP [] Progressed/Changed HEP based on:  
[] positioning   [] body mechanics   [] transfers   [] heat/ice application   
[] other:   
 
Other Objective/Functional Measures: Patient with increased pain today. Dr. Cynthia Kay present for part of session, observed patients right shoulder and requested to hold manual therapy today and utilize vasopneumatic device. Pain Level (0-10 scale) post treatment: 8 
 
ASSESSMENT/Changes in Function: Patient able to perform all exercises with proper technique. When observing patients shoulder it was noted that her bra strap was placing increased pressure on [address] joint possibly contributing to increased pain levels. Patient educated on wearing a strapless bra until pain subsides. Patient will continue to benefit from skilled PT services to modify and progress therapeutic interventions, address functional mobility deficits, address ROM deficits, address strength deficits, analyze and address soft tissue restrictions, analyze and cue movement patterns, analyze and modify body mechanics/ergonomics and assess and modify postural abnormalities to attain remaining goals. [x]  See Plan of Care 
[]  See progress note/recertification 
[]  See Discharge Summary Progress towards goals / Updated goals: 
  
Short Term Goals: To be accomplished in 1 weeks: 1. Pt will be I and compliant with HEP  Goal met (12/3/18) Per patient report Long Term Goals: To be accomplished in 4-8 weeks: 1. Improve FOTO to 66 to improve ability for functional tasks 2. Improve PROM scaption of the right shoulder to 140 degrees to improve future use of daily tasks 3. Pt will report being able to flush the toiliet without difficulty. 4. Improve PROM right shoulder ER to 45 degrees for improved ability for daily tasks. PLAN 
[]  Upgrade activities as tolerated     [x]  Continue plan of care 
[]  Update interventions per flow sheet      
[]  Discharge due to:_ 
[]  Other:_ Karolina Cid, PT 12/3/2018  5:05 PM 
 
Future Appointments Date Time Provider Bryson Alissa 12/6/2018  8:00 AM Ashley Stallworth PT [facility]  
12/6/2018  9:00 AM MENA [address]  
12/10/2018  5:00 PM Scott, [address] [facility]  
12/12/2018  4:30 PM Anat Petty, PT [facility]  
12/17/2018  5:00 PM Duncan Winter [facility]  
12/19/2018  5:00 PM Anat Petty, PT [facility]  
12/26/2018  5:00 PM Anat Petty, PT [facility]

## 2018-12-06 ENCOUNTER — HOSPITAL ENCOUNTER (OUTPATIENT)
Dept: PHYSICAL THERAPY | Age: 58
Discharge: HOME | End: 2018-12-06
Payer: COMMERCIAL

## 2018-12-06 ENCOUNTER — OFFICE VISIT (OUTPATIENT)
Dept: ORTHOPEDIC SURGERY | Age: 58
End: 2018-12-06

## 2018-12-06 VITALS
DIASTOLIC BLOOD PRESSURE: 80 MMHG | RESPIRATION RATE: 16 BRPM | BODY MASS INDEX: 26.39 KG/M2 | TEMPERATURE: 98 F | HEART RATE: 66 BPM | SYSTOLIC BLOOD PRESSURE: 136 MMHG | WEIGHT: 158.4 LBS | HEIGHT: 65 IN | OXYGEN SATURATION: 98 %

## 2018-12-06 DIAGNOSIS — M25.511 ACUTE PAIN OF RIGHT SHOULDER: ICD-10-CM

## 2018-12-06 DIAGNOSIS — S42.291D OTHER CLOSED DISPLACED FRACTURE OF PROXIMAL END OF RIGHT HUMERUS WITH ROUTINE HEALING, SUBSEQUENT ENCOUNTER: Primary | ICD-10-CM

## 2018-12-06 PROCEDURE — 97016 VASOPNEUMATIC DEVICE THERAPY: CPT

## 2018-12-06 PROCEDURE — 97110 THERAPEUTIC EXERCISES: CPT

## 2018-12-06 PROCEDURE — 97140 MANUAL THERAPY 1/> REGIONS: CPT

## 2018-12-06 RX ORDER — HYDROCODONE BITARTRATE AND ACETAMINOPHEN 5; 325 MG/1; MG/1
1 TABLET ORAL
Qty: 28 TAB | Refills: 0 | Status: SHIPPED | OUTPATIENT
Start: 2018-12-06 | End: 2018-12-27 | Stop reason: SDUPTHER

## 2018-12-06 RX ORDER — HYDROCODONE BITARTRATE AND ACETAMINOPHEN 7.5; 325 MG/1; MG/1
1 TABLET ORAL
Qty: 28 TAB | Refills: 0 | Status: CANCELLED | OUTPATIENT
Start: 2018-12-06

## 2018-12-06 NOTE — PROGRESS NOTES
PT DAILY TREATMENT NOTE 10-18 Patient Name: Katerine Dallas Date:2018 : 1960 [x]  Patient  Verified Payor: BLUE CROSS / Plan: [facility] / Product Type: PPO / In time 803  Out time:846 Total Treatment Time (min): 43 Visit #: 3 of 8 Medicare/BCBS Only Total Timed Codes (min):  28 1:1 Treatment Time:  28 Treatment Area: Other displaced fracture of upper end of right humerus, subsequent encounter for fracture with routine healing [S42.890A] SUBJECTIVE Pain Level (0-10 scale): 4 Any medication changes, allergies to medications, adverse drug reactions, diagnosis change, or new procedure performed?: [x] No    [] Yes (see summary sheet for update) Subjective functional status/changes:   [] No changes reported \"My shoulder pain was unbearable the last session bc I donned a bra strap. I have since been wearing strapless bra and pain has decreased. I was told not to wear the sling at home, but I always wear it while out in the community. \" OBJECTIVE Modality rationale: 15 min decrease edema, decrease inflammation and decrease pain to improve the patients ability to perform functional activities with decreased pain. Type Additional Details  
[] Estim:  []Unatt       []IFC  []Premod []Other:  []w/ice   []w/heat Position: Location:  
[] Estim: []Att    []TENS instruct  []NMES []Other:  []w/US   []w/ice   []w/heat Position: Location:  
[]  Traction: [] Cervical       []Lumbar 
                     [] Prone          []Supine []Intermittent   []Continuous Lbs: 
[] before manual 
[] after manual  
[]  Ultrasound: []Continuous   [] Pulsed []1MHz   []3MHz W/cm2: 
Location:  
[]  Iontophoresis with dexamethasone Location: [] Take home patch  
[] In clinic  
[]  Ice     []  heat 
[]  Ice massage 
[]  Laser  
[]  Anodyne Position: Location:  
[]  Laser with stim []  Other:  Position: Location:  
[x]  Vasopneumatic Device to the right shoulder  Pressure:       [x] lo [] med [] hi  
Temperature: [x] lo [] med [] hi  
[x] Skin assessment post-treatment:  [x]intact []redness- no adverse reaction 
  []redness  adverse reaction:  
 
20 min Therapeutic Exercise:  [x] See flow sheet :  
Rationale: increase ROM and increase strength to improve the patients ability to perform ADLs. 8 min manual therapy: PROM right GH joint in scapular plane within protocol/pain level/precautions With 
 [x] TE 
 [] TA 
 [] neuro 
 [] other: Patient Education: [x] Review HEP [] Progressed/Changed HEP based on:  
[] positioning   [] body mechanics   [] transfers   [] heat/ice application   
[] other:   
 
Other Objective/Functional Measures:  
Pt tolerated manual therapy well today without any c/o pain Pain Level (0-10 scale) post treatment: 0 
 
ASSESSMENT/Changes in Function: Pt performed all therex this session and tolerated PROM well without any c/o pain. Patient will continue to benefit from skilled PT services to modify and progress therapeutic interventions, address functional mobility deficits, address ROM deficits, address strength deficits, analyze and address soft tissue restrictions, analyze and cue movement patterns, analyze and modify body mechanics/ergonomics and assess and modify postural abnormalities to attain remaining goals. [x]  See Plan of Care 
[]  See progress note/recertification 
[]  See Discharge Summary Progress towards goals / Updated goals: 
  
Short Term Goals: To be accomplished in 1 weeks: 1. Pt will be I and compliant with HEP  Goal met (12/3/18) Per patient report Long Term Goals: To be accomplished in 4-8 weeks: 1. Improve FOTO to 66 to improve ability for functional tasks 2. Improve PROM scaption of the right shoulder to 140 degrees to improve future use of daily tasks 3. Pt will report being able to flush the toiliet without difficulty. 4. Improve PROM right shoulder ER to 45 degrees for improved ability for daily tasks. PLAN 
[]  Upgrade activities as tolerated     [x]  Continue plan of care 
[]  Update interventions per flow sheet      
[]  Discharge due to:_ 
[]  Other:_   
 
Chelo Berger, PT 12/6/2018  5:05 PM 
 
Future Appointments Date Time Provider Bryson Cappsi 12/6/2018  9:00 AM [facility]  
12/10/2018  5:00 PM Scott, [address] [facility]  
12/12/2018  4:30 PM Ethyl [place], PT [facility]  
12/17/2018  5:00 PM Pop Kearns [facility]  
12/19/2018  5:00 PM Ethyl [place], PT [facility]  
12/26/2018  5:00 PM Ethyl [place], PT [facility]

## 2018-12-06 NOTE — PROGRESS NOTES
Sandro Davis 1960 HISTORY OF PRESENT ILLNESS Sandro Davis is a 59 y.o. female who presents today for evaluation s/p ORIF Right humerus on 11/5/18. Patient pain is a 4/10. She is currently in her sling. Home health is coming to her house currently for PT but she believes they are discharging her today. She has now started outpatient PT. She reports improvement since last OV. Patient denies any fever, chills, chest pain, shortness of breath or calf pain. The remainder of the review of systems is negative. There are no new illness or injuries to report since last seen in the office. No changes in medications, allergies, social or family history. PHYSICAL EXAM:  
Visit Vitals /80 Pulse 66 Temp 98 °F (36.7 °C) Resp 16 Ht 5' 5\" (1.651 m) Wt 158 lb 6.4 oz (71.8 kg) SpO2 98% BMI 26.36 kg/m² The patient is a well-developed, well-nourished female in no acute distress. The patient is alert and oriented times three. The patient appears to be well groomed. Mood and affect are normal. 
ORTHOPEDIC EXAM of right shoulder: Inspection:mild swelling Incision, clean, dry, intact, sutures in place Range of motion: 0-30 degrees passive glenohumeral abduction 
ttp ant lateral aspect of shoulder Stability: Stable Strength: n/a Imaging: XR of right shoulder dated 12/6/18 was reviewed and read: proximal humerus fracture with a short humeral nail in excellent position no significant callus noted. 2 view xray images of right humerus on 11/15/2018 read and reviewed by myself reveal reduced proximal humerus fracture with hardware in place. No callus formation IMPRESSION:   
  ICD-10-CM ICD-9-CM 1. Other closed displaced fracture of proximal end of right humerus with routine healing, subsequent encounter S42.291D V54.11 REFERRAL TO PHYSICAL THERAPY HYDROcodone-acetaminophen (NORCO) 5-325 mg per tablet 2. Acute pain of right shoulder M25.511 719.41 AMB POC XRAY; HUMERUS, 2+ VIEWS  
   REFERRAL TO PHYSICAL THERAPY HYDROcodone-acetaminophen (NORCO) 5-325 mg per tablet PLAN:  
1. Patient is doing well post operatively 2. D/c use of sling after 2 weeks. 3. PROM x 2 weeks until callus is seen. Then transition to AROM. 4. Stressed no increases in pain 5. No driving x 3 weeks 6. Given refill of Norco 5 mg. Patient given pain medication for short term acute pain relief. Goal is to treat patient according to above plan and to ultimately have patient off all pain medications once appropriate. If chronic pain management is required beyond what is expected for current orthopedic problem, will refer patient to pain management.  was reviewed and will be reviewed with every medication refill request.  
 
 
 
RTC 3 weeks Follow-up Disposition: Not on File Scribed by Sowmya Claudio (Isac Mohr) as dictated by Brandon Rodrigues MD 
 
I, Dr. Brandon Rodrigues, confirm that all documentation is accurate.  
 
Brandon Rodrigues M.D.  
[facility]

## 2018-12-10 ENCOUNTER — HOSPITAL ENCOUNTER (OUTPATIENT)
Dept: PHYSICAL THERAPY | Age: 58
Discharge: HOME | End: 2018-12-10
Payer: COMMERCIAL

## 2018-12-10 PROCEDURE — 97140 MANUAL THERAPY 1/> REGIONS: CPT

## 2018-12-10 PROCEDURE — 97110 THERAPEUTIC EXERCISES: CPT

## 2018-12-10 PROCEDURE — 97016 VASOPNEUMATIC DEVICE THERAPY: CPT

## 2018-12-10 NOTE — PROGRESS NOTES
PT DAILY TREATMENT NOTE 10-18 Patient Name: Jose Burroughs Date:12/10/2018 : 1960 [x]  Patient  Verified Payor: BLUE CROSS / Plan: [facility]OSS [employer] / Product Type: PPO / In time:5:02  Out time:5:32 Total Treatment Time (min): 30 Visit #: 4 of 8 Medicare/BCBS Only Total Timed Codes (min):  23 1:1 Treatment Time:  21 Treatment Area: Other displaced fracture of upper end of right humerus, subsequent encounter for fracture with routine healing [S42.291D] SUBJECTIVE Pain Level (0-10 scale): 3 Any medication changes, allergies to medications, adverse drug reactions, diagnosis change, or new procedure performed?: [x] No    [] Yes (see summary sheet for update) Subjective functional status/changes:   [] No changes reported Pt presents with updated script to progress to AROM in 2 weeks. OBJECTIVE Modality rationale: decrease inflammation and decrease pain to improve the patients ability to perform ADLs and self care Min Type Additional Details  
 [] Estim:  []Unatt       []IFC  []Premod []Other:  []w/ice   []w/heat Position: Location:  
 [] Estim: []Att    []TENS instruct  []NMES []Other:  []w/US   []w/ice   []w/heat Position: Location:  
 []  Traction: [] Cervical       []Lumbar 
                     [] Prone          []Supine []Intermittent   []Continuous Lbs: 
[] before manual 
[] after manual  
 []  Ultrasound: []Continuous   [] Pulsed []1MHz   []3MHz W/cm2: 
Location:  
 []  Iontophoresis with dexamethasone Location: [] Take home patch  
[] In clinic  
 []  Ice     []  heat 
[]  Ice massage 
[]  Laser  
[]  Anodyne Position: Location:  
 []  Laser with stim 
[]  Other:  Position: Location:  
10 [x]  Vasopneumatic Device Pressure:       [x] lo [] med [] hi  
Temperature: [x] lo [] med [] hi  
[] Skin assessment post-treatment:  []intact []redness- no adverse reaction 
  []redness  adverse reaction:  
 
 
10 min Therapeutic Exercise:  [] See flow sheet :  
Rationale: increase ROM and increase strength to improve the patients ability to perform daily tasks 10 min Manual Therapy:  Right STJ mobs, PROM right shoulder flex/abd/ER&IR at neutral  
Rationale: increase ROM and increase tissue extensibility to perform ADLs and self care With 
 [] TE 
 [] TA 
 [] neuro 
 [] other: Patient Education: [x] Review HEP [] Progressed/Changed HEP based on:  
[] positioning   [] body mechanics   [] transfers   [] heat/ice application   
[] other:   
 
Other Objective/Functional Measures: 90 deg right shoulder flexion PROM Pain Level (0-10 scale) post treatment: 3 
 
ASSESSMENT/Changes in Function: Pt with improved pain levels and improved tolerance to manual therapy today. She shows slowly progressing shoulder mobility, will progress as tolerated into AAROM towards AROM in 2 weeks. Patient will continue to benefit from skilled PT services to modify and progress therapeutic interventions, address functional mobility deficits, address ROM deficits, address strength deficits, analyze and address soft tissue restrictions, analyze and cue movement patterns and analyze and modify body mechanics/ergonomics to attain remaining goals. []  See Plan of Care 
[]  See progress note/recertification 
[]  See Discharge Summary Progress towards goals / Updated goals: 
Short Term Goals: To be accomplished in 1 weeks: 
             1. Pt will be I and compliant with HEP  Goal met (12/3/18) Per patient report  
  
Long Term Goals: To be accomplished in 4-8 weeks: 
             1. Improve FOTO to 66 to improve ability for functional tasks              2. Improve PROM scaption of the right shoulder to 140 degrees to improve future use of daily tasks progressing 90 deg 12/10/18 
             3. Pt will report being able to flush the toiliet without difficulty.              4. Improve PROM right shoulder ER to 45 degrees for improved ability for daily tasks. PLAN 
[]  Upgrade activities as tolerated     []  Continue plan of care 
[]  Update interventions per flow sheet      
[]  Discharge due to:_ 
[]  Other:_ [facility] 12/10/2018  5:02 PM 
 
Future Appointments Date Time Provider Bryson Alissa 12/12/2018  4:30 PM [facility] [place]  
12/17/2018  5:00 PM Sharon Jin [facility]  
12/19/2018  5:00 PM [facility]  
12/26/2018  5:00 PM [facility]  
12/27/2018  8:45 AM MENA [address]

## 2018-12-12 ENCOUNTER — HOSPITAL ENCOUNTER (OUTPATIENT)
Dept: PHYSICAL THERAPY | Age: 58
Discharge: HOME | End: 2018-12-12
Payer: COMMERCIAL

## 2018-12-12 PROCEDURE — 97140 MANUAL THERAPY 1/> REGIONS: CPT

## 2018-12-12 NOTE — PROGRESS NOTES
PT DAILY TREATMENT NOTE 10-18    Patient Name: Sahara Jones  Date:2018  : 1960  [x]  Patient  Verified  Payor: BLUE CROSS / Plan: [employer] [facility] / Product Type: PPO /    In time:6:00  Out time:6:31  Total Treatment Time (min): 31  Visit #: 5 of 8    Medicare/BCBS Only   Total Timed Codes (min):  31 1:1 Treatment Time:  19       Treatment Area: Other displaced fracture of upper end of right humerus, subsequent encounter for fracture with routine healing [S42.291D]    SUBJECTIVE  Pain Level (0-10 scale): 2/10  Any medication changes, allergies to medications, adverse drug reactions, diagnosis change, or new procedure performed?: [x] No    [] Yes (see summary sheet for update)  Subjective functional status/changes:   [] No changes reported  \"Doing better today. \"    OBJECTIVE    23 min Therapeutic Exercise:  [x] See flow sheet :   Rationale: increase ROM and increase proprioception to improve the patients ability to perform ADL's.    8 min Manual Therapy:  Right ST joint mobs, shoulder PROM, ER/IR at neutral.   Rationale: decrease pain, increase ROM and increase tissue extensibility to perform ADL's and self care activities. With   [x] TE   [] TA   [] neuro   [] other: Patient Education: [x] Review HEP    [] Progressed/Changed HEP based on:   [] positioning   [] body mechanics   [] transfers   [] heat/ice application    [] other:      Other Objective/Functional Measures: No change in there ex. Pain Level (0-10 scale) post treatment: 210    ASSESSMENT/Changes in Function: Discomfort with table slides, instructed to perform with palm up, able to perform with less discomfort. PROM progressing slowly. Pt stated \"this has been my best week so far. \"    Patient will continue to benefit from skilled PT services to modify and progress therapeutic interventions, address functional mobility deficits, address ROM deficits, analyze and address soft tissue restrictions and analyze and cue movement patterns to attain remaining goals. [x]  See Plan of Care  []  See progress note/recertification  []  See Discharge Summary         Progress towards goals / Updated goals:  Short Term Goals: To be accomplished in 1 weeks:               1. Pt will be I and compliant with HEP  Goal met (12/3/18) Per patient report      Long Term Goals: To be accomplished in 4-8 weeks:               1. Improve FOTO to 66 to improve ability for functional tasks               2. Improve PROM scaption of the right shoulder to 140 degrees to improve future use of daily tasks progressing 90 deg 12/10/18               3. Pt will report being able to flush the toiliet without difficulty.              4. Improve PROM right shoulder ER to 45 degrees for improved ability for daily tasks.     PLAN  []  Upgrade activities as tolerated     [x]  Continue plan of care  []  Update interventions per flow sheet       []  Discharge due to:_  []  Other:_      Berta Bauman, ABILIO 12/12/2018  5:59 PM    Future Appointments   Date Time Provider Bryson Maxwell   12/12/2018  6:00 PM [facility]   12/17/2018  5:00 PM [facility]   12/19/2018  5:00 PM Sisi Precise, [facility]   12/26/2018  5:00 PM [facility]isi Precise[facility]   12/27/2018  8:45 AM MENA Perez 67

## 2018-12-17 ENCOUNTER — HOSPITAL ENCOUNTER (OUTPATIENT)
Dept: PHYSICAL THERAPY | Age: 58
Discharge: HOME | End: 2018-12-17
Payer: COMMERCIAL

## 2018-12-17 PROCEDURE — 97110 THERAPEUTIC EXERCISES: CPT

## 2018-12-17 PROCEDURE — 97140 MANUAL THERAPY 1/> REGIONS: CPT

## 2018-12-17 PROCEDURE — 97016 VASOPNEUMATIC DEVICE THERAPY: CPT

## 2018-12-17 NOTE — PROGRESS NOTES
PT DAILY TREATMENT NOTE 10-18    Patient Name: Veronica Post  Date:2018  : 1960  [x]  Patient  Verified  Payor: BLUE CROSS / Plan:  [facility] / Product Type: PPO /    In time:5:00  Out time:5:42  Total Treatment Time (min): 42  Visit #: 6 of 8    Medicare/BCBS Only   Total Timed Codes (min):  32 1:1 Treatment Time:  30       Treatment Area:  Other displaced fracture of upper end of right humerus, subsequent encounter for fracture with routine healing [S42.528D]    SUBJECTIVE  Pain Level (0-10 scale): 4  Any medication changes, allergies to medications, adverse drug reactions, diagnosis change, or new procedure performed?: [x] No    [] Yes (see summary sheet for update)  Subjective functional status/changes:   [] No changes reported  \"A little more sore than usual, we went out of town so I don't know if riding or walking did it\"    OBJECTIVE    Modality rationale: decrease inflammation and decrease pain to improve the patients ability to perform ADLs   Min Type Additional Details    [] Estim:  []Unatt       []IFC  []Premod                        []Other:  []w/ice   []w/heat  Position:  Location:    [] Estim: []Att    []TENS instruct  []NMES                    []Other:  []w/US   []w/ice   []w/heat  Position:  Location:    []  Traction: [] Cervical       []Lumbar                       [] Prone          []Supine                       []Intermittent   []Continuous Lbs:  [] before manual  [] after manual    []  Ultrasound: []Continuous   [] Pulsed                           []1MHz   []3MHz W/cm2:  Location:    []  Iontophoresis with dexamethasone         Location: [] Take home patch   [] In clinic    []  Ice     []  heat  []  Ice massage  []  Laser   []  Anodyne Position:  Location:    []  Laser with stim  []  Other:  Position:  Location:   10 [x]  Vasopneumatic Device Pressure:       [x] lo [] med [] hi   Temperature: [x] lo [] med [] hi   [] Skin assessment post-treatment:  []intact []redness- no adverse reaction    []redness  adverse reaction:         22 min Therapeutic Exercise:  [] See flow sheet :   Rationale: increase ROM and increase strength to improve the patients ability to perform daily tasks and self care    10 min Manual Therapy:  Right GH mobs post/inf grade II-III, PROM right shoulder flex/abd/ER&IR   Rationale: increase ROM and increase tissue extensibility to improve ease of ADLs            With   [] TE   [] TA   [] neuro   [] other: Patient Education: [x] Review HEP    [] Progressed/Changed HEP based on:   [] positioning   [] body mechanics   [] transfers   [] heat/ice application    [] other:      Other Objective/Functional Measures:      Pain Level (0-10 scale) post treatment: 3    ASSESSMENT/Changes in Function: Pt still limited by guarding and firm end feel with end range shoulder mobility. Progressed into AAROM today with good tolerance to attempt improved mobility as pt is allowed more control over her shoulder. Progress as tolerated with AAROM towards AROM    Patient will continue to benefit from skilled PT services to modify and progress therapeutic interventions, address functional mobility deficits, address ROM deficits, address strength deficits, analyze and address soft tissue restrictions, analyze and cue movement patterns and analyze and modify body mechanics/ergonomics to attain remaining goals. []  See Plan of Care  []  See progress note/recertification  []  See Discharge Summary         Progress towards goals / Updated goals:  Short Term Goals: To be accomplished in 1 weeks:               1. Pt will be I and compliant with HEP  Goal met (12/3/18) Per patient report      Long Term Goals: To be accomplished in 4-8 weeks:               1. Improve FOTO to 66 to improve ability for functional tasks               2.  Improve PROM scaption of the right shoulder to 140 degrees to improve future use of daily tasks progressing 90 deg 12/10/18; slow progress, just past 90 deg 12/17/18               3. Pt will report being able to flush the toiliet without difficulty.              4. Improve PROM right shoulder ER to 45 degrees for improved ability for daily tasks.     PLAN  []  Upgrade activities as tolerated     []  Continue plan of care  []  Update interventions per flow sheet       []  Discharge due to:_  []  Other:_      Eliz Young DPT, CMTPT 12/17/2018  5:03 PM    Future Appointments   Date Time Provider Bryson Maxwell   12/19/2018  5:00 PM Dino Dubois, PT [facility]   12/26/2018  5:00 PM Dino Dubois, PT [facility]   12/27/2018  8:45 AM MENA Dorsey 67

## 2018-12-19 ENCOUNTER — HOSPITAL ENCOUNTER (OUTPATIENT)
Dept: PHYSICAL THERAPY | Age: 58
Discharge: HOME | End: 2018-12-19
Payer: COMMERCIAL

## 2018-12-19 PROCEDURE — 97140 MANUAL THERAPY 1/> REGIONS: CPT

## 2018-12-19 PROCEDURE — 97110 THERAPEUTIC EXERCISES: CPT

## 2018-12-19 PROCEDURE — 97016 VASOPNEUMATIC DEVICE THERAPY: CPT

## 2018-12-19 NOTE — PROGRESS NOTES
PT DAILY TREATMENT NOTE 10-18    Patient Name: Shiloh Khan  Date:2018  : 1960  [x]  Patient  Verified  Payor: BLUE CROSS / Plan: [employer] [place] [address] [place] / Product Type: PPO /    In time:5:01  Out time:5:40  Total Treatment Time (min): 39  Visit #: 7 of 8    Medicare/BCBS Only   Total Timed Codes (min):  29 1:1 Treatment Time:  25       Treatment Area: Other displaced fracture of upper end of right humerus, subsequent encounter for fracture with routine healing [S42.291D]    SUBJECTIVE  Pain Level (0-10 scale): 2-3  Any medication changes, allergies to medications, adverse drug reactions, diagnosis change, or new procedure performed?: [x] No    [] Yes (see summary sheet for update)  Subjective functional status/changes:   [] No changes reported  Patient reports having increased pain at night, but overall is noticing improvements in mobility.      OBJECTIVE    Modality rationale: decrease edema, decrease inflammation and decrease pain to improve the patients ability to perform ADLs with decreased pain   Min Type Additional Details    [] Estim:  []Unatt       []IFC  []Premod                        []Other:  []w/ice   []w/heat  Position:  Location:    [] Estim: []Att    []TENS instruct  []NMES                    []Other:  []w/US   []w/ice   []w/heat  Position:  Location:    []  Traction: [] Cervical       []Lumbar                       [] Prone          []Supine                       []Intermittent   []Continuous Lbs:  [] before manual  [] after manual    []  Ultrasound: []Continuous   [] Pulsed                           []1MHz   []3MHz W/cm2:  Location:    []  Iontophoresis with dexamethasone         Location: [] Take home patch   [] In clinic    []  Ice     []  heat  []  Ice massage  []  Laser   []  Anodyne Position:  Location:    []  Laser with stim  []  Other:  Position:  Location:   10 [x]  Vasopneumatic Device Pressure:       [x] lo [] med [] hi   Temperature: [x] lo [] med [] hi   [] Skin assessment post-treatment:  []intact []redness- no adverse reaction    []redness  adverse reaction:     10 min Therapeutic Exercise:  [x] See flow sheet :   Rationale: increase ROM and increase strength to improve the patients ability to perform functional activities with improved mobility. 10 min Manual Therapy:  PROM   Rationale: decrease pain, increase ROM and increase tissue extensibility to improve patients functional mobility. With   [] TE   [] TA   [] neuro   [] other: Patient Education: [x] Review HEP    [] Progressed/Changed HEP based on:   [] positioning   [] body mechanics   [] transfers   [] heat/ice application    [] other:      Other Objective/Functional Measures: Patient progressed to sidelying shoulder abduction and ER     Pain Level (0-10 scale) post treatment: 2-3    ASSESSMENT/Changes in Function: Patient tolerates all exercises without increase in symptoms. Patient is making improvements with ROM. Increased muscle guarding noted with PROM, requiring moderate cueing for relaxation. Patient will continue to benefit from skilled PT services to modify and progress therapeutic interventions, address functional mobility deficits, address ROM deficits, address strength deficits, analyze and address soft tissue restrictions, analyze and cue movement patterns, analyze and modify body mechanics/ergonomics and assess and modify postural abnormalities to attain remaining goals. [x]  See Plan of Care  []  See progress note/recertification  []  See Discharge Summary         Progress towards goals / Updated goals:  Short Term Goals: To be accomplished in 1 weeks:               1. Pt will be I and compliant with HEP  Goal met (12/3/18) Per patient report      Long Term Goals: To be accomplished in 4-8 weeks:               1. Improve FOTO to 66 to improve ability for functional tasks               2.  Improve PROM scaption of the right shoulder to 140 degrees to improve future use of daily tasks progressing 90 deg 12/10/18; slow progress, just past 90 deg 12/17/18               3. Pt will report being able to flush the toiliet without difficulty.              4. Improve PROM right shoulder ER to 45 degrees for improved ability for daily tasks.       PLAN  []  Upgrade activities as tolerated     [x]  Continue plan of care  []  Update interventions per flow sheet       []  Discharge due to:_  []  Other:_      Jerry Myers, PT 12/19/2018  5:07 PM    Future Appointments   Date Time Provider Bryson Cappsi   12/26/2018  5:00 PM Aníbal Rivers, DANIELLE [facility]   12/27/2018  8:45 AM MENA Davenport 67

## 2018-12-26 ENCOUNTER — HOSPITAL ENCOUNTER (OUTPATIENT)
Dept: PHYSICAL THERAPY | Age: 58
Discharge: HOME | End: 2018-12-26
Payer: COMMERCIAL

## 2018-12-26 PROCEDURE — 97110 THERAPEUTIC EXERCISES: CPT

## 2018-12-26 PROCEDURE — 97016 VASOPNEUMATIC DEVICE THERAPY: CPT

## 2018-12-26 PROCEDURE — 97140 MANUAL THERAPY 1/> REGIONS: CPT

## 2018-12-26 NOTE — PROGRESS NOTES
PT DAILY TREATMENT NOTE 10-18    Patient Name: Dolores Truong  Date:2018  : 1960  [x]  Patient  Verified  Payor: BLUE CROSS / Plan: [address] [place], [place] [address]way / Product Type: PPO /     In time:5:00  Out time:5:40  Total Treatment Time (min): 40  Visit #: 8 of 8    Medicare/BCBS Only   Total Timed Codes (min):  30 1:1 Treatment Time:  30       Treatment Area: Other displaced fracture of upper end of right humerus, subsequent encounter for fracture with routine healing [S42.291D]    SUBJECTIVE  Pain Level (0-10 scale): 2-3  Any medication changes, allergies to medications, adverse drug reactions, diagnosis change, or new procedure performed?: [x] No    [] Yes (see summary sheet for update)  Subjective functional status/changes:   [] No changes reported  \"My pain is not too bad today\". Patient states she will schedule more appointments after seeing her doctor tomorrow morning. OBJECTIVE    Modality rationale: decrease edema, decrease inflammation and decrease pain to improve the patients ability to perform ADLs with decreased pain.     Type Additional Details   [] Estim:  []Unatt       []IFC  []Premod                        []Other:  []w/ice   []w/heat  Position:  Location:   [] Estim: []Att    []TENS instruct  []NMES                    []Other:  []w/US   []w/ice   []w/heat  Position:  Location:   []  Traction: [] Cervical       []Lumbar                       [] Prone          []Supine                       []Intermittent   []Continuous Lbs:  [] before manual  [] after manual   []  Ultrasound: []Continuous   [] Pulsed                           []1MHz   []3MHz W/cm2:  Location:   []  Iontophoresis with dexamethasone         Location: [] Take home patch   [] In clinic   []  Ice     []  heat  []  Ice massage  []  Laser   []  Anodyne Position:  Location:   []  Laser with stim  []  Other:  Position:  Location:   [x]  Vasopneumatic Device Pressure:       [x] lo [] med [] hi   Temperature: [x] lo [] med [] hi   [x] Skin assessment post-treatment:  [x]intact []redness- no adverse reaction    []redness  adverse reaction:       20 min Therapeutic Exercise:  [x] See flow sheet :   Rationale: increase ROM and increase strength to improve the patients ability to perform ADLs with improved mobility. 10 min Manual Therapy:  PROM    Rationale: decrease pain, increase ROM and increase tissue extensibility to improve patients functional mobility. With   [x] TE   [] TA   [] neuro   [] other: Patient Education: [x] Review HEP    [] Progressed/Changed HEP based on:   [x] positioning   [] body mechanics   [] transfers   [] heat/ice application    [] other:      Other Objective/Functional Measures: Right shoulder ER - 28 degrees     Pain Level (0-10 scale) post treatment: 2    ASSESSMENT/Changes in Function: Patient performs all exercises without increase in symptoms. Improvements in right shoulder AROM noted today. Patient is continuing to make progress toward established goals and puts forth great effort during threrapy sessions. Patient will continue to benefit from skilled PT services to modify and progress therapeutic interventions, address functional mobility deficits, address ROM deficits, address strength deficits, analyze and address soft tissue restrictions, analyze and cue movement patterns, analyze and modify body mechanics/ergonomics and assess and modify postural abnormalities to attain remaining goals. [x]  See Plan of Care  []  See progress note/recertification  []  See Discharge Summary         Progress towards goals / Updated goals:    Short Term Goals: To be accomplished in 1 weeks:               1. Pt will be I and compliant with HEP  Goal met (12/3/18) Per patient report      Long Term Goals: To be accomplished in 4-8 weeks:               1. Improve FOTO to 66 to improve ability for functional tasks               2.  Improve PROM scaption of the right shoulder to 140 degrees to improve future use of daily tasks progressing 90 deg 12/10/18; slow progress, just past 90 deg 12/17/18               3. Pt will report being able to flush the toiliet without difficulty. Goal met per patient report 12/26/18               4. Improve PROM right shoulder ER to 45 degrees for improved ability for daily tasks.  Progressing - Right shoulder ER - 28 degrees (12/26/18)    PLAN  []  Upgrade activities as tolerated     [x]  Continue plan of care  []  Update interventions per flow sheet       []  Discharge due to:_  []  Other:_      Cammie Valdivia, PT 12/26/2018  5:02 PM    Future Appointments   Date Time Provider Bryson Maxwell   12/27/2018  8:45 AM Sarai Lee PA [address]

## 2018-12-26 NOTE — PROGRESS NOTES
[facility]  [address]  [place], [address]  [phone] fax    Physical Therapy Progress Note  Patient name: Naren Moon Start of Care: 18   Referral source: Ki HartmannCt jorge : 1960   Medical/Treatment Diagnosis: Other displaced fracture of upper end of right humerus, subsequent encounter for fracture with routine healing [S42.291D]  Payor: BLUE CROSS / Plan: [employer] [facility] [employer] [facility] / Product Type: PPO /  Onset Date:18     Prior Hospitalization: see medical history Provider#: [id number]   Medications: Verified on Patient Summary List    Comorbidities: Right RTC repair  Prior Level of Function:Independent with all ADLs and daily activities; left handed. Visits from Start of Care: 8    Missed Visits: 0      Key Functional Changes: Patient is continuing to make progress toward established goals. Patient is still limited in right Shoulder ROM and strength. Patient improved right shoulder ER to 28 degrees today. Patient requires moderate cueing for proper positioning during exercises. Short Term Goals: To be accomplished in 1 weeks:               1. Pt will be I and compliant with HEP  Goal met (12/3/18) Per patient report      Long Term Goals: To be accomplished in 4-8 weeks:               1. Improve FOTO to 66 to improve ability for functional tasks Progressing - FOTO = 54 (18)               2. Improve PROM scaption of the right shoulder to 140 degrees to improve future use of daily tasks progressing 90 deg 12/10/18; slow progress, just past 90 deg 18               3. Pt will report being able to flush the toiliet without difficulty. Goal met per patient report 18               4. Improve PROM right shoulder ER to 45 degrees for improved ability for daily tasks. Progressing - Right shoulder ER - 28 degrees (18)       Updated Goals: to be achieved in 4 weeks:   1.  Improve FOTO to 66 to improve ability for functional tasks Progressing - FOTO = 54 (12/26/18)   2. Improve PROM scaption of the right shoulder to 140 degrees to improve future use of daily tasks progressing 90 deg 12/10/18; slow progress, just past 90 deg 12/17/18  3. Improve PROM right shoulder ER to 45 degrees for improved ability for daily tasks. Progressing - Right shoulder ER - 28 degrees (12/26/18)      ASSESSMENT/RECOMMENDATIONS:  [x]Continue therapy per initial plan/protocol at a frequency of  2 x per week for 4 weeks  []Continue therapy with the following recommended changes:_____________________      _____________________________________________________________________  []Discontinue therapy progressing towards or have reached established goals  []Discontinue therapy due to lack of appreciable progress towards goals  []Discontinue therapy due to lack of attendance or compliance  []Await Physician's recommendations/decisions regarding therapy  []Other:________________________________________________________________    Thank you for this referral.    Cammie Valdivia, PT 12/26/2018 6:57 PM  NOTE TO PHYSICIAN:  PLEASE COMPLETE THE ORDERS BELOW AND   FAX TO [facility]: [phone]  If you are unable to process this request in 24 hours please contact our office: [phone]    ? I have read the above report and request that my patient continue as recommended. ? I have read the above report and request that my patient continue therapy with the following changes/special instructions:__________________________________________________________  ? I have read the above report and request that my patient be discharged from therapy.     Physicians signature: ______________________________Date: ______Time:______

## 2018-12-27 ENCOUNTER — OFFICE VISIT (OUTPATIENT)
Dept: ORTHOPEDIC SURGERY | Age: 58
End: 2018-12-27

## 2018-12-27 VITALS
OXYGEN SATURATION: 100 % | BODY MASS INDEX: 26.16 KG/M2 | TEMPERATURE: 98.1 F | WEIGHT: 157 LBS | SYSTOLIC BLOOD PRESSURE: 135 MMHG | HEIGHT: 65 IN | DIASTOLIC BLOOD PRESSURE: 83 MMHG | RESPIRATION RATE: 16 BRPM | HEART RATE: 75 BPM

## 2018-12-27 DIAGNOSIS — S42.291D OTHER CLOSED DISPLACED FRACTURE OF PROXIMAL END OF RIGHT HUMERUS WITH ROUTINE HEALING, SUBSEQUENT ENCOUNTER: Primary | ICD-10-CM

## 2018-12-27 DIAGNOSIS — M25.511 ACUTE PAIN OF RIGHT SHOULDER: ICD-10-CM

## 2018-12-27 RX ORDER — HYDROCODONE BITARTRATE AND ACETAMINOPHEN 5; 325 MG/1; MG/1
1 TABLET ORAL
Qty: 28 TAB | Refills: 0 | Status: SHIPPED | OUTPATIENT
Start: 2018-12-27 | End: 2019-01-24 | Stop reason: SDUPTHER

## 2018-12-27 NOTE — PROGRESS NOTES
Elyssa Euceda  1960     HISTORY OF PRESENT ILLNESS  Elyssa Eucdea is a 59 y.o. female who presents today for evaluation s/p ORIF Right humerus on 11/5/18. Patient pain is a 4/10. She is no longer wearing her sling. She is doing outpatient PT now. She reports improvement since last OV. Patient denies any fever, chills, chest pain, shortness of breath or calf pain. The remainder of the review of systems is negative. There are no new illness or injuries to report since last seen in the office. No changes in medications, allergies, social or family history. PHYSICAL EXAM:   Visit Vitals  /83   Pulse 75   Temp 98.1 °F (36.7 °C) (Oral)   Resp 16   Ht 5' 5\" (1.651 m)   Wt 157 lb (71.2 kg)   SpO2 100%   BMI 26.13 kg/m²      The patient is a well-developed, well-nourished female in no acute distress. The patient is alert and oriented times three. The patient appears to be well groomed. Mood and affect are normal.  ORTHOPEDIC EXAM of right shoulder:  Inspection:mild swelling  Incisions well healed  Range of motion: 0-70 degrees passive glenohumeral abduction  ttp ant lateral aspect of shoulder  Stability: Stable  Strength: 4/5    Imaging:   XR of right shoulder dated 12/27/18 was reviewed and read: proximal humerus fracture with a short humeral nail in excellent position  callus formation noted. XR of right shoulder dated 12/6/18 was reviewed and read: proximal humerus fracture with a short humeral nail in excellent position no significant callus noted. 2 view xray images of right humerus on 11/15/2018 read and reviewed by myself reveal reduced proximal humerus fracture with hardware in place. No callus formation     IMPRESSION:      ICD-10-CM ICD-9-CM    1.  Other closed displaced fracture of proximal end of right humerus with routine healing, subsequent encounter S42.291D V54.11 AMB POC XRAY; HUMERUS, 2+ VIEWS      HYDROcodone-acetaminophen (NORCO) 5-325 mg per tablet      REFERRAL TO PHYSICAL THERAPY   2. Acute pain of right shoulder M25.511 719.41 HYDROcodone-acetaminophen (NORCO) 5-325 mg per tablet      REFERRAL TO PHYSICAL THERAPY      PLAN:   1. Patient is doing well post operatively  2. D/c use of sling after 2 weeks. 3. PROM x 2 weeks until callus is seen. Then transition to AROM. 4. Stressed no increases in pain  5. No driving x 3 weeks  6. Given refill of Norco 5 mg. Patient given pain medication for short term acute pain relief. Goal is to treat patient according to above plan and to ultimately have patient off all pain medications once appropriate. If chronic pain management is required beyond what is expected for current orthopedic problem, will refer patient to pain management.  was reviewed and will be reviewed with every medication refill request.         RTC 3 weeks   Follow-up Disposition: Not on File    Kate Lima PA-C.    [address] and Spine Specialist

## 2019-01-09 ENCOUNTER — HOSPITAL ENCOUNTER (OUTPATIENT)
Dept: PHYSICAL THERAPY | Age: 59
Discharge: HOME | End: 2019-01-09
Payer: COMMERCIAL

## 2019-01-09 PROCEDURE — 97140 MANUAL THERAPY 1/> REGIONS: CPT

## 2019-01-09 PROCEDURE — 97110 THERAPEUTIC EXERCISES: CPT

## 2019-01-09 PROCEDURE — 97016 VASOPNEUMATIC DEVICE THERAPY: CPT

## 2019-01-14 ENCOUNTER — APPOINTMENT (OUTPATIENT)
Dept: PHYSICAL THERAPY | Age: 59
End: 2019-01-14
Payer: COMMERCIAL

## 2019-01-16 ENCOUNTER — APPOINTMENT (OUTPATIENT)
Dept: PHYSICAL THERAPY | Age: 59
End: 2019-01-16
Payer: COMMERCIAL

## 2019-01-24 ENCOUNTER — OFFICE VISIT (OUTPATIENT)
Dept: ORTHOPEDIC SURGERY | Age: 59
End: 2019-01-24

## 2019-01-24 VITALS
DIASTOLIC BLOOD PRESSURE: 80 MMHG | HEART RATE: 68 BPM | HEIGHT: 65 IN | OXYGEN SATURATION: 99 % | TEMPERATURE: 98.6 F | SYSTOLIC BLOOD PRESSURE: 131 MMHG | WEIGHT: 157.2 LBS | BODY MASS INDEX: 26.19 KG/M2 | RESPIRATION RATE: 16 BRPM

## 2019-01-24 DIAGNOSIS — S42.291D OTHER CLOSED DISPLACED FRACTURE OF PROXIMAL END OF RIGHT HUMERUS WITH ROUTINE HEALING, SUBSEQUENT ENCOUNTER: Primary | ICD-10-CM

## 2019-01-24 RX ORDER — HYDROCODONE BITARTRATE AND ACETAMINOPHEN 7.5; 325 MG/1; MG/1
1 TABLET ORAL
Qty: 28 TAB | Refills: 0 | Status: SHIPPED | OUTPATIENT
Start: 2019-01-24 | End: 2019-02-21 | Stop reason: SDUPTHER

## 2019-01-24 NOTE — PROGRESS NOTES
Dayton Farrar 1960 HISTORY OF PRESENT ILLNESS Dayton Farrar is a 59 y.o. female who presents today for evaluation s/p ORIF Right humerus on 11/5/18. Patient pain is a 4/10. She is no longer wearing her sling. She is doing outpatient PT now. She reports improvement since last OV. She is able to reach to top of her head. Has some soreness. Notes she is doing laundry and cleaning with arm. Notes some soreness after. Patient denies any fever, chills, chest pain, shortness of breath or calf pain. The remainder of the review of systems is negative. There are no new illness or injuries to report since last seen in the office. No changes in medications, allergies, social or family history. PHYSICAL EXAM:  
Visit Vitals /80 (BP 1 Location: Right arm, BP Patient Position: Sitting) Pulse 68 Temp 98.6 °F (37 °C) (Oral) Resp 16 Ht 5' 5\" (1.651 m) Wt 157 lb 3.2 oz (71.3 kg) SpO2 99% BMI 26.16 kg/m² The patient is a well-developed, well-nourished female in no acute distress. The patient is alert and oriented times three. The patient appears to be well groomed. Mood and affect are normal. 
ORTHOPEDIC EXAM of right shoulder: Inspection:mild swelling Incisions well healed Range of motion: 0-80 degrees passive glenohumeral abduction 
ttp ant lateral aspect of shoulder Stability: Stable Strength: 4/5, able to reach to back of head Imaging: XR of right shoulder dated 1/24/19 was reviewed and read: proximal humerus fracture with a short humeral nail in excellent position  callus formation noted. XR of right shoulder dated 12/27/18 was reviewed and read: proximal humerus fracture with a short humeral nail in excellent position  callus formation noted. XR of right shoulder dated 12/6/18 was reviewed and read: proximal humerus fracture with a short humeral nail in excellent position no significant callus noted. 2 view xray images of right humerus on 11/15/2018 read and reviewed by myself reveal reduced proximal humerus fracture with hardware in place. No callus formation IMPRESSION:   
  ICD-10-CM ICD-9-CM 1. Other closed displaced fracture of proximal end of right humerus with routine healing, subsequent encounter S42.291D V54.11 PLAN:  
1. Patient is doing well post operatively 2. Stressed that patient is likely doing too much with her arm and we need to limit activities being mindful of pain. 3. Will cont with PT working on gentle strengthening with no increases in pain RTC 4 weeks Patient seen and evaluated by Dr. Catherine Velez today who agrees with treatment plan Follow-up Disposition: Not on File Kulwant Lira PA-C.   
[facility]

## 2019-01-24 NOTE — PROGRESS NOTES
1. Have you been to the ER, urgent care clinic since your last visit? Hospitalized since your last visit? NO 
 
2. Have you seen or consulted any other health care providers outside of the [address] [place] [address] since your last visit? Include any pap smears or colon screening.  NO

## 2019-01-30 ENCOUNTER — HOSPITAL ENCOUNTER (OUTPATIENT)
Dept: PHYSICAL THERAPY | Age: 59
Discharge: HOME | End: 2019-01-30
Payer: COMMERCIAL

## 2019-01-30 PROCEDURE — 97110 THERAPEUTIC EXERCISES: CPT

## 2019-01-30 PROCEDURE — 97140 MANUAL THERAPY 1/> REGIONS: CPT

## 2019-01-30 NOTE — PROGRESS NOTES
PT DAILY TREATMENT NOTE 10-18 Patient Name: Roxanne Terry Date:2019 : 1960 [x]  Patient  Verified Payor: BLUE CROSS / Plan: [place] [employer] / Product Type: PPO / In time:5:00  Out time:5:45 Total Treatment Time (min): 45 Visit #: 2 of 8 Medicare/BCBS Only Total Timed Codes (min):  45 1:1 Treatment Time:  45  
 
 
Treatment Area: Other displaced fracture of upper end of right humerus, subsequent encounter for fracture with routine healing [S42.291D] SUBJECTIVE Pain Level (0-10 scale): 5 Any medication changes, allergies to medications, adverse drug reactions, diagnosis change, or new procedure performed?: [x] No    [] Yes (see summary sheet for update) Subjective functional status/changes:   [] No changes reported Pt reports she had to cancel some appointments and then we could not get her back in. She reports HEP compliance. OBJECTIVE 35 min Therapeutic Exercise:  [x] See flow sheet :  
Rationale: increase ROM and increase strength to improve the patients ability to perform ADLs with improved mobility. 10 min Manual Therapy: right shoulder STJ mobs, GHJ inferior and posterior glides grade 2-3,  PROM right shoulder all planes Rationale: decrease pain, increase ROM and increase tissue extensibility to improve patients functional mobility. With 
 [x] TE 
 [] TA 
 [] neuro 
 [] other: Patient Education: [x] Review HEP [] Progressed/Changed HEP based on:  
[x] positioning   [] body mechanics   [] transfers   [] heat/ice application   
[] other:   
 
Other Objective/Functional Measures:  Progressed therex per flow sheet. PROM: 128 degrees flexion, ER in scapular plane: 50 degrees, IR in scapular plane to 45 degrees. Pain Level (0-10 scale) post treatment: 5 
 
ASSESSMENT/Changes in Function:  Pt with diminished GHJ and scapular mobility. Her limited attendance seems has been a limiting factor.  She does demonstrate improved PROM since last progress note and tolerated increase in therex without pain increase. Pt will benefit from continuation with more consistency with attendance to continue functional progress. Patient will continue to benefit from skilled PT services to modify and progress therapeutic interventions, address functional mobility deficits, address ROM deficits, address strength deficits, analyze and address soft tissue restrictions, analyze and cue movement patterns, analyze and modify body mechanics/ergonomics and assess and modify postural abnormalities to attain remaining goals. []  See Plan of Care [x]  See progress note/recertification 
[]  See Discharge Summary Progress towards goals / Updated goals: 
 
Short Term Goals: To be accomplished in 1 weeks: 
             1. Pt will be I and compliant with HEP  Goal met (12/3/18) Per patient report  
  
Long Term Goals: To be accomplished in 4-8 weeks: 
             1. Improve FOTO to 66 to improve ability for functional tasks- progressing 54 on 12-26-18 
             2. Improve PROM scaption of the right shoulder to 140 degrees to improve future use of daily tasks progressing 90 deg 12/10/18; slow progress, just past 90 deg 12/17/18 
             3. Pt will report being able to flush the toiliet without difficulty. Goal met per patient report 12/26/18 
             4. Improve PROM right shoulder ER to 45 degrees for improved ability for daily tasks. Progressing - Right shoulder ER - 28 degrees (12/26/18), improved to 50 degrees on 1-30-19 PLAN 
[]  Upgrade activities as tolerated     [x]  Continue plan of care 
[]  Update interventions per flow sheet      
[]  Discharge due to:_ 
[]  Other:_ Yoly May, PT 1/30/2019  5:00 PM 
 
Future Appointments Date Time Provider Bryson Maxwell 1/30/2019  5:00 PM Leslie Wilburn, PT [facility]  
2/6/2019  5:00 PM Leslie Wilburn, PT [facility]  
 2/8/2019  5:00 PM Leslie Wilburn, PT [facility]  
2/21/2019  8:45 AM MENA Jay 69

## 2019-01-30 NOTE — PROGRESS NOTES
In [address]  [place] [address] [place], [address] 
[phone] fax Physical Therapy Progress Note Patient name: Amanda Clark Start of Care: 18 Referral source: Lay James [place] : 1960 Medical/Treatment Diagnosis: Other displaced fracture of upper end of right humerus, subsequent encounter for fracture with routine healing [S42.291D] Payor: BLUE CROSS / Plan: "[employer] [facility] [email or web address] / Product Type: PPO /  Onset Date:18 
   
Prior Hospitalization: see medical history Provider#: [id number] Medications: Verified on Patient Summary List    
Comorbidities: Right RTC repair Prior Level of Function:Independent with all ADLs and daily activities; left handed.  
 
Visits from Start of Care: 10                                      Missed Visits: 2 Key Functional Changes: Pt with diminished GHJ and scapular mobility. Her limited attendance over the past month seems has been a limiting factor. She cancelled her final scheduled appointments and did not reschedule. She does demonstrate improved PROM since last progress note and tolerated increase in therex without pain increase. Pt will benefit from continuation with more consistency with attendance to continue functional progress. PROM: 128 degrees flexion, ER in scapular plane: 50 degrees, IR in scapular plane to 45 degrees.  
  
 
Patient will continue to benefit from skilled PT services to modify and progress therapeutic interventions, address functional mobility deficits, address ROM deficits, address strength deficits, analyze and address soft tissue restrictions, analyze and cue movement patterns, analyze and modify body mechanics/ergonomics and assess and modify postural abnormalities to attain remaining goals. Progress towards goals: 
 
Short Term Goals: To be accomplished in 1 weeks: 
             1.  Pt will be I and compliant with HEP  Goal met (12/3/18) Per patient report  
  
Long Term Goals: To be accomplished in 4-8 weeks: 
             1. Improve FOTO to 66 to improve ability for functional tasks- progressing 54 on 12-26-18 
             2. Improve PROM scaption of the right shoulder to 140 degrees to improve future use of daily tasks progressing 90 deg 12/10/18; slow progress, just past 90 deg 12/17/18, 128 degrees on 1-30-19 
             3. Pt will report being able to flush the toiliet without difficulty. Goal met per patient report 12/26/18 
             4. Improve PROM right shoulder ER to 45 degrees for improved ability for daily tasks. Progressing - Right shoulder ER - 28 degrees (12/26/18), MET- improved to 50 degrees on 1-30-19 Updated Goals: to be achieved in 4 weeks: 
         1. Improve FOTO to 66 to improve ability for functional tasks- progressing 54 on 12-26-18 
             2. Improve PROM scaption of the right shoulder to 140 degrees to improve future use of daily tasks progressing 90 deg 12/10/18; slow progress, just past 90 deg 12/17/18, 128 degrees on 1-30-19 3. Pt will report no difficulty with reaching a shelf shoulder height. 4. Pt will report no difficulty with making her bed. 5. Improve right shoulder AROM flexion to 120 degrees to improve ability for functional tasks. ASSESSMENT/RECOMMENDATIONS: 
[x]Continue therapy per initial plan/protocol at a frequency of  2 x per week for 4 weeks []Continue therapy with the following recommended changes:_____________________      _____________________________________________________________________ []Discontinue therapy progressing towards or have reached established goals []Discontinue therapy due to lack of appreciable progress towards goals []Discontinue therapy due to lack of attendance or compliance []Await Physician's recommendations/decisions regarding therapy []Other:________________________________________________________________ Thank you for this referral.   
 Prasanth Elaine, PT 1/30/2019 5:50 PM 
NOTE TO PHYSICIAN:  PLEASE COMPLETE THE ORDERS BELOW AND  
FAX TO [facility] Physical Therapy: [phone] If you are unable to process this request in 24 hours please contact our office: [phone] ? I have read the above report and request that my patient continue as recommended. ? I have read the above report and request that my patient continue therapy with the following changes/special instructions:__________________________________________________________ ? I have read the above report and request that my patient be discharged from therapy. Physicians signature: ______________________________Date: ______Time:______

## 2019-02-04 ENCOUNTER — HOSPITAL ENCOUNTER (OUTPATIENT)
Dept: PHYSICAL THERAPY | Age: 59
Discharge: HOME | End: 2019-02-04
Payer: COMMERCIAL

## 2019-02-04 PROCEDURE — 97110 THERAPEUTIC EXERCISES: CPT

## 2019-02-04 PROCEDURE — 97140 MANUAL THERAPY 1/> REGIONS: CPT

## 2019-02-06 ENCOUNTER — HOSPITAL ENCOUNTER (OUTPATIENT)
Dept: PHYSICAL THERAPY | Age: 59
Discharge: HOME | End: 2019-02-06
Payer: COMMERCIAL

## 2019-02-06 PROCEDURE — 97140 MANUAL THERAPY 1/> REGIONS: CPT

## 2019-02-06 PROCEDURE — 97110 THERAPEUTIC EXERCISES: CPT

## 2019-02-06 NOTE — PROGRESS NOTES
PT DAILY TREATMENT NOTE 10-18 Patient Name: Silas Later Date:2019 : 1960 [x]  Patient  Verified Payor: BLUE CROSS / Plan: [place] BLUE [employer]OSS [employer]ER[employer] / Product Type: PPO / In time:4:56  Out time:5:34 Total Treatment Time (min):38 Visit #: 2 of 8 Medicare/BCBS Only Total Timed Codes (min):  38 1:1 Treatment Time:  45 Treatment Area: Other displaced fracture of upper end of right humerus, subsequent encounter for fracture with routine healing [S42.291D] SUBJECTIVE Pain Level (0-10 scale): 3-410 Any medication changes, allergies to medications, adverse drug reactions, diagnosis change, or new procedure performed?: [x] No    [] Yes (see summary sheet for update) Subjective functional status/changes:   [] No changes reported Pt reports she is doing okay. She feels her pain stays around a 3 or 4. She reports no difficulty with making her bed when asked. OBJECTIVE 30 min Therapeutic Exercise:  [x] See flow sheet :  
Rationale: increase ROM and increase strength to improve the patients ability to improve ADL ease. 8 min Manual Therapy:  Right GHJ PROM with inferior and posterior capsular mobs. Rationale: decrease pain, increase ROM and increase tissue extensibility to improve ADL ease. With 
 [] TE 
 [] TA 
 [] neuro 
 [] other: Patient Education: [x] Review HEP [] Progressed/Changed HEP based on:  
[] positioning   [] body mechanics   [] transfers   [] heat/ice application   
[] other:   
 
Other Objective/Functional Measures: increased reps per flow sheet Pain Level (0-10 scale) post treatment: 3-4/10 ASSESSMENT/Changes in Function:  Pt demonstrating improved overall function and ROM but does remain limited. She has met goal 4.  
 
Patient will continue to benefit from skilled PT services to modify and progress therapeutic interventions, address functional mobility deficits, address ROM deficits, address strength deficits, analyze and address soft tissue restrictions, analyze and cue movement patterns, analyze and modify body mechanics/ergonomics, assess and modify postural abnormalities and instruct in home and community integration to attain remaining goals. []  See Plan of Care 
[]  See progress note/recertification 
[]  See Discharge Summary Progress towards goals / Updated goals: 
 
Updated Goals: to be achieved in 4 weeks: 
         1. Improve FOTO to 66 to improve ability for functional tasks- progressing 54 on 12-26-18 
             2. Improve PROM scaption of the right shoulder to 140 degrees to improve future use of daily tasks progressing 90 deg 12/10/18; slow progress, just past 90 deg 12/17/18, 128 degrees on 1-30-19 3. Pt will report no difficulty with reaching a shelf shoulder height. - progressing 2-6-19 4. Pt will report no difficulty with making her bed. - met 2-6-19  
            5. Improve right shoulder AROM flexion to 120 degrees to improve ability for functional tasks. Demonstrates 100 degrees flexion AROM 2/04/2019 PLAN 
[]  Upgrade activities as tolerated     [x]  Continue plan of care 
[]  Update interventions per flow sheet      
[]  Discharge due to:_ 
[]  Other:_ Samia Whitehead, PT 2/6/2019  5:05 PM 
 
Future Appointments Date Time Provider Bryson Maxwell 2/6/2019  5:00 PM Ashleigh Millard PT [facility]  
2/21/2019  8:45 AM MENA Dial 69

## 2019-02-08 ENCOUNTER — APPOINTMENT (OUTPATIENT)
Dept: PHYSICAL THERAPY | Age: 59
End: 2019-02-08
Payer: COMMERCIAL

## 2019-02-13 ENCOUNTER — HOSPITAL ENCOUNTER (OUTPATIENT)
Dept: PHYSICAL THERAPY | Age: 59
Discharge: HOME | End: 2019-02-13
Payer: COMMERCIAL

## 2019-02-13 PROCEDURE — 97110 THERAPEUTIC EXERCISES: CPT

## 2019-02-13 PROCEDURE — 97140 MANUAL THERAPY 1/> REGIONS: CPT

## 2019-02-13 NOTE — PROGRESS NOTES
PT DAILY TREATMENT NOTE 10-18 Patient Name: Susan Huang Date:2019 : 1960 [x]  Patient  Verified Payor: BLUE CROSS / Plan: [place] BLUE [place]OSS [employer]ERAL / Product Type: PPO / In time:5:28  Out time:6:10 Total Treatment Time (min): 42 Visit #: 3 of 8 Medicare/BCBS Only Total Timed Codes (min):  42 1:1 Treatment Time:  42 Treatment Area: Other displaced fracture of upper end of right humerus, subsequent encounter for fracture with routine healing [S42.291D] SUBJECTIVE Pain Level (0-10 scale): 210 Any medication changes, allergies to medications, adverse drug reactions, diagnosis change, or new procedure performed?: [x] No    [] Yes (see summary sheet for update) Subjective functional status/changes:   [] No changes reported \"Feeling good today, not much pain. \" OBJECTIVE 34 min Therapeutic Exercise:  [x] See flow sheet :  
Rationale: increase ROM, increase strength and increase proprioception to improve the patients ability to perform ADL's. 
 
8 min Manual Therapy:  Right ST joint mobs, GH joint mobs inferior/posterior, shoulder PROM. Rationale: decrease pain, increase ROM and increase tissue extensibility to improve ease of performing functional activities. With 
 [x] TE 
 [] TA 
 [] neuro 
 [] other: Patient Education: [x] Review HEP [] Progressed/Changed HEP based on:  
[] positioning   [] body mechanics   [] transfers   [] heat/ice application   
[] other:   
 
Other Objective/Functional Measures: Changed UBE to retro/forward 3' each. Pain Level (0-10 scale) post treatment: 2/10 ASSESSMENT/Changes in Function: Pt making steady ROM progress, puts forth good effort and reports continued HEP compliance. Pain level has decreased slightly since previous visits.  
 
Patient will continue to benefit from skilled PT services to modify and progress therapeutic interventions, address functional mobility deficits, address ROM deficits, address strength deficits, analyze and address soft tissue restrictions and analyze and cue movement patterns to attain remaining goals. [x]  See Plan of Care 
[]  See progress note/recertification 
[]  See Discharge Summary Progress towards goals / Updated goals: 
Updated Goals: to be achieved in 4 weeks: 
         1. Improve FOTO to 66 to improve ability for functional tasks- progressing 54 on 12-26-18 
             2. Improve PROM scaption of the right shoulder to 140 degrees to improve future use of daily tasks progressing 90 deg 12/10/18; slow progress, just past 90 deg 12/17/18, 128 degrees on 1-30-19 
            3. Pt will report no difficulty with reaching a shelf shoulder height. - progressing 2-6-19 
            4. Pt will report no difficulty with making her bed. - met 2-6-19  
            5. Improve right shoulder AROM flexion to 120 degrees to improve ability for functional tasks.  Demonstrates 100 degrees flexion AROM 2/04/2019 PLAN 
[]  Upgrade activities as tolerated     [x]  Continue plan of care 
[]  Update interventions per flow sheet      
[]  Discharge due to:_ 
[]  Other:_   
 
Janette Holman PTA 2/13/2019  5:31 PM 
 
Future Appointments Date Time Provider Bryson Maxwell 2/13/2019  6:00 PM Vinayak Avery PTA [facility]  
2/21/2019  8:45 AM MENA Castañeda [address]

## 2019-02-21 ENCOUNTER — OFFICE VISIT (OUTPATIENT)
Dept: ORTHOPEDIC SURGERY | Age: 59
End: 2019-02-21

## 2019-02-21 VITALS
OXYGEN SATURATION: 100 % | HEART RATE: 68 BPM | TEMPERATURE: 98.1 F | BODY MASS INDEX: 26.26 KG/M2 | RESPIRATION RATE: 16 BRPM | HEIGHT: 65 IN | DIASTOLIC BLOOD PRESSURE: 47 MMHG | SYSTOLIC BLOOD PRESSURE: 116 MMHG | WEIGHT: 157.6 LBS

## 2019-02-21 DIAGNOSIS — S42.291D OTHER CLOSED DISPLACED FRACTURE OF PROXIMAL END OF RIGHT HUMERUS WITH ROUTINE HEALING, SUBSEQUENT ENCOUNTER: Primary | ICD-10-CM

## 2019-02-21 RX ORDER — TRAMADOL HYDROCHLORIDE 50 MG/1
50 TABLET ORAL
Qty: 40 TAB | Refills: 0 | Status: SHIPPED | OUTPATIENT
Start: 2019-02-21 | End: 2022-08-23

## 2019-02-21 NOTE — PROGRESS NOTES
Liana Kearney 1960 HISTORY OF PRESENT ILLNESS Liana Kearney is a 59 y.o. female who presents today for evaluation s/p ORIF Right humerus on 11/5/18. Patient pain is a 3/10. Lavada Saucer She reports improvement since last OV. She is able to reach over her head. Has some soreness. Notes she is doing laundry and cleaning with arm. Notes some soreness after but feels like it is improving. She has been going to PT occasionally. Her last session was 1 week ago. Patient denies any fever, chills, chest pain, shortness of breath or calf pain. The remainder of the review of systems is negative. There are no new illness or injuries to report since last seen in the office. No changes in medications, allergies, social or family history. PHYSICAL EXAM:  
Visit Vitals /47 (BP 1 Location: Left arm, BP Patient Position: Sitting) Pulse 68 Temp 98.1 °F (36.7 °C) (Oral) Resp 16 Ht 5' 5\" (1.651 m) Wt 157 lb 9.6 oz (71.5 kg) SpO2 100% BMI 26.23 kg/m² The patient is a well-developed, well-nourished female in no acute distress. The patient is alert and oriented times three. The patient appears to be well groomed. Mood and affect are normal. 
LYMPHATIC: lymph nodes are not enlarged and are within normal limits SKIN: normal in color and non tender to palpation. There are no bruises or abrasions noted. NEUROLOGICAL: Motor sensory exam is within normal limits. Reflexes are equal bilaterally. There is normal sensation to pinprick and light touch ORTHOPEDIC EXAM of right shoulder: Inspection:no swelling Incisions well healed Range of motion: 0-80 degrees passive glenohumeral abduction, able to reach above head today 
ttp ant lateral aspect of shoulder Stability: Stable Strength: 4+/5 Imaging: XR of right shoulder dated 1/24/19 was reviewed and read: proximal humerus fracture with a short humeral nail in excellent position  callus formation noted. XR of right shoulder dated 12/27/18 was reviewed and read: proximal humerus fracture with a short humeral nail in excellent position  callus formation noted. XR of right shoulder dated 12/6/18 was reviewed and read: proximal humerus fracture with a short humeral nail in excellent position no significant callus noted. 2 view xray images of right humerus on 11/15/2018 read and reviewed by myself reveal reduced proximal humerus fracture with hardware in place. No callus formation IMPRESSION:   
  ICD-10-CM ICD-9-CM 1. Other closed displaced fracture of proximal end of right humerus with routine healing, subsequent encounter S42.291D V54.11 PLAN:  
1. Patient continues to improve post operatively. Will cont to gradually increase her activities being mindful of pain. Will revisit PT x 1 session for instruction on HEP Risk factors include: osteopenia 2. No cortisone injection indicated today 3. Yes Physical/Occupational Therapy indicated today: right shoulder for HEP 4. No diagnostic test indicated today: 5. No durable medical equipment indicated today 6. No referral indicated today 7. No medications indicated today:  
8. Last rx of tramadol given today. Patient given pain medication for short term acute pain relief. Goal is to treat patient according to above plan and to ultimately have patient off all pain medications once appropriate. If chronic pain management is required beyond what is expected for current orthopedic problem, will refer patient to pain management.  was reviewed and will be reviewed with every medication refill request.  
  
 
RTC PRN Follow-up Disposition: Not on File Mague Lemos PA-C.   
[facility]

## 2019-02-21 NOTE — PROGRESS NOTES
1. Have you been to the ER, urgent care clinic since your last visit? Hospitalized since your last visit? NO 
 
2. Have you seen or consulted any other health care providers outside of the [address] [place] [address] since your last visit? Include any pap smears or colon screening.  NO

## 2019-03-08 ENCOUNTER — APPOINTMENT (OUTPATIENT)
Dept: PHYSICAL THERAPY | Age: 59
End: 2019-03-08

## 2019-03-14 ENCOUNTER — HOSPITAL ENCOUNTER (OUTPATIENT)
Dept: BONE DENSITY | Age: 59
Discharge: HOME | End: 2019-03-14
Attending: FAMILY MEDICINE
Payer: COMMERCIAL

## 2019-03-14 DIAGNOSIS — M81.0 AGE-RELATED OSTEOPOROSIS WITHOUT CURRENT PATHOLOGICAL FRACTURE: ICD-10-CM

## 2019-03-14 PROCEDURE — 77080 DXA BONE DENSITY AXIAL: CPT

## 2019-03-15 NOTE — PROGRESS NOTES
[facility]  [address]  [place], [address]  [phone] fax    Physical Therapy Discharge Summary  Patient name: Terri Fong Start of Care: 18   Referral Nataliia PjYesenia jonesma : 1960   Medical/Treatment Diagnosis: Other displaced fracture of upper end of right humerus, subsequent encounter for fracture with routine healing [S42.291D]  Payor: BLUE CROSS / Plan: [employer] / Product Type: PPO /  Onset Date:18      Prior Hospitalization: see medical history Provider#: [id number]   Medications: Verified on Patient Summary List     Comorbidities: Right RTC repair  Prior Level of Function:Independent with all ADLs and daily activities; left handed.    Visits from Start of Care: 13    Missed Visits: 1  Reporting Period : 2019 to 2019      Summary of Care:  Updated Goals: to be achieved in 4 weeks:           1. Improve FOTO to 66 to improve ability for functional tasks- progressing 54 on 18               2. Improve PROM scaption of the right shoulder to 140 degrees to improve future use of daily tasks progressing 90 deg 12/10/18; slow progress, just past 90 deg 18, 128 degrees on 19              3. Pt will report no difficulty with reaching a shelf shoulder height. - progressing 19              4. Pt will report no difficulty with making her bed. - met 19               5. Improve right shoulder AROM flexion to 120 degrees to improve ability for functional tasks.  Demonstrates 100 degrees flexion AROM 2019        ASSESSMENT/RECOMMENDATIONS: The patient opted for early D/C per communication log. Unable to reassess goals with program incomplete and FOTO score not met.     [x]Discontinue therapy: []Patient has reached or is progressing toward set goals      [x]Patient is non-compliant or has abdicated      []Due to lack of appreciable progress towards set goals    Georgia Ruiz Yakov, PT 3/15/2019 7:44 AM

## 2019-09-10 ENCOUNTER — APPOINTMENT (OUTPATIENT)
Dept: GENERAL RADIOLOGY | Age: 59
End: 2019-09-10
Attending: PHYSICIAN ASSISTANT
Payer: COMMERCIAL

## 2019-09-10 ENCOUNTER — APPOINTMENT (OUTPATIENT)
Dept: CT IMAGING | Age: 59
End: 2019-09-10
Attending: PHYSICIAN ASSISTANT
Payer: COMMERCIAL

## 2019-09-10 ENCOUNTER — HOSPITAL ENCOUNTER (EMERGENCY)
Age: 59
Discharge: HOME | End: 2019-09-10
Attending: EMERGENCY MEDICINE
Payer: COMMERCIAL

## 2019-09-10 VITALS
WEIGHT: 149 LBS | BODY MASS INDEX: 24.83 KG/M2 | SYSTOLIC BLOOD PRESSURE: 134 MMHG | HEART RATE: 68 BPM | TEMPERATURE: 97.8 F | RESPIRATION RATE: 16 BRPM | DIASTOLIC BLOOD PRESSURE: 56 MMHG | HEIGHT: 65 IN | OXYGEN SATURATION: 98 %

## 2019-09-10 DIAGNOSIS — R10.9 FLANK PAIN: Primary | ICD-10-CM

## 2019-09-10 LAB
ALBUMIN SERPL-MCNC: 4.8 G/DL (ref 3.4–5)
ALBUMIN/GLOB SERPL: 1.3 {RATIO} (ref 0.8–1.7)
ALP SERPL-CCNC: 94 U/L (ref 45–117)
ALT SERPL-CCNC: 27 U/L (ref 13–56)
ANION GAP SERPL CALC-SCNC: 8 MMOL/L (ref 3–18)
APPEARANCE UR: CLEAR
AST SERPL-CCNC: 25 U/L (ref 10–38)
BASOPHILS # BLD: 0 K/UL (ref 0–0.1)
BASOPHILS NFR BLD: 0 % (ref 0–2)
BILIRUB SERPL-MCNC: 0.3 MG/DL (ref 0.2–1)
BILIRUB UR QL: NEGATIVE
BUN SERPL-MCNC: 11 MG/DL (ref 7–18)
BUN/CREAT SERPL: 11 (ref 12–20)
CALCIUM SERPL-MCNC: 10.1 MG/DL (ref 8.5–10.1)
CHLORIDE SERPL-SCNC: 103 MMOL/L (ref 100–111)
CK MB CFR SERPL CALC: 2 % (ref 0–4)
CK MB SERPL-MCNC: 4.6 NG/ML (ref 5–25)
CK SERPL-CCNC: 229 U/L (ref 26–192)
CO2 SERPL-SCNC: 27 MMOL/L (ref 21–32)
COLOR UR: YELLOW
CREAT SERPL-MCNC: 1.02 MG/DL (ref 0.6–1.3)
D DIMER PPP FEU-MCNC: 0.32 UG/ML(FEU)
DIFFERENTIAL METHOD BLD: ABNORMAL
EOSINOPHIL # BLD: 0.1 K/UL (ref 0–0.4)
EOSINOPHIL NFR BLD: 2 % (ref 0–5)
EPITH CASTS URNS QL MICRO: NORMAL /LPF (ref 0–5)
ERYTHROCYTE [DISTWIDTH] IN BLOOD BY AUTOMATED COUNT: 15.1 % (ref 11.6–14.5)
GLOBULIN SER CALC-MCNC: 3.6 G/DL (ref 2–4)
GLUCOSE SERPL-MCNC: 88 MG/DL (ref 74–99)
GLUCOSE UR STRIP.AUTO-MCNC: NEGATIVE MG/DL
HCT VFR BLD AUTO: 36.9 % (ref 35–45)
HGB BLD-MCNC: 11.7 G/DL (ref 12–16)
HGB UR QL STRIP: ABNORMAL
KETONES UR QL STRIP.AUTO: NEGATIVE MG/DL
LEUKOCYTE ESTERASE UR QL STRIP.AUTO: ABNORMAL
LYMPHOCYTES # BLD: 4.4 K/UL (ref 0.9–3.6)
LYMPHOCYTES NFR BLD: 47 % (ref 21–52)
MCH RBC QN AUTO: 27.5 PG (ref 24–34)
MCHC RBC AUTO-ENTMCNC: 31.7 G/DL (ref 31–37)
MCV RBC AUTO: 86.8 FL (ref 74–97)
MONOCYTES # BLD: 0.8 K/UL (ref 0.05–1.2)
MONOCYTES NFR BLD: 9 % (ref 3–10)
NEUTS SEG # BLD: 3.9 K/UL (ref 1.8–8)
NEUTS SEG NFR BLD: 42 % (ref 40–73)
NITRITE UR QL STRIP.AUTO: NEGATIVE
PH UR STRIP: 6 [PH] (ref 5–8)
PLATELET # BLD AUTO: 347 K/UL (ref 135–420)
PMV BLD AUTO: 10.9 FL (ref 9.2–11.8)
POTASSIUM SERPL-SCNC: 3.4 MMOL/L (ref 3.5–5.5)
PROT SERPL-MCNC: 8.4 G/DL (ref 6.4–8.2)
PROT UR STRIP-MCNC: NEGATIVE MG/DL
RBC # BLD AUTO: 4.25 M/UL (ref 4.2–5.3)
RBC #/AREA URNS HPF: NORMAL /HPF (ref 0–5)
SODIUM SERPL-SCNC: 138 MMOL/L (ref 136–145)
SP GR UR REFRACTOMETRY: 1 (ref 1–1.03)
TROPONIN I SERPL-MCNC: <0.02 NG/ML (ref 0–0.04)
UROBILINOGEN UR QL STRIP.AUTO: 0.2 EU/DL (ref 0.2–1)
WBC # BLD AUTO: 9.3 K/UL (ref 4.6–13.2)
WBC URNS QL MICRO: NORMAL /HPF (ref 0–4)

## 2019-09-10 PROCEDURE — 81001 URINALYSIS AUTO W/SCOPE: CPT

## 2019-09-10 PROCEDURE — 82550 ASSAY OF CK (CPK): CPT

## 2019-09-10 PROCEDURE — 85025 COMPLETE CBC W/AUTO DIFF WBC: CPT

## 2019-09-10 PROCEDURE — 85379 FIBRIN DEGRADATION QUANT: CPT

## 2019-09-10 PROCEDURE — 80053 COMPREHEN METABOLIC PANEL: CPT

## 2019-09-10 PROCEDURE — 74176 CT ABD & PELVIS W/O CONTRAST: CPT

## 2019-09-10 PROCEDURE — 99284 EMERGENCY DEPT VISIT MOD MDM: CPT

## 2019-09-10 PROCEDURE — 74011250636 HC RX REV CODE- 250/636: Performed by: PHYSICIAN ASSISTANT

## 2019-09-10 PROCEDURE — 96374 THER/PROPH/DIAG INJ IV PUSH: CPT

## 2019-09-10 PROCEDURE — 96375 TX/PRO/DX INJ NEW DRUG ADDON: CPT

## 2019-09-10 PROCEDURE — 93005 ELECTROCARDIOGRAM TRACING: CPT

## 2019-09-10 RX ORDER — KETOROLAC TROMETHAMINE 15 MG/ML
15 INJECTION, SOLUTION INTRAMUSCULAR; INTRAVENOUS
Status: COMPLETED | OUTPATIENT
Start: 2019-09-10 | End: 2019-09-10

## 2019-09-10 RX ORDER — ONDANSETRON 2 MG/ML
4 INJECTION INTRAMUSCULAR; INTRAVENOUS
Status: COMPLETED | OUTPATIENT
Start: 2019-09-10 | End: 2019-09-10

## 2019-09-10 RX ORDER — ACETAMINOPHEN AND CODEINE PHOSPHATE 300; 30 MG/1; MG/1
1 TABLET ORAL
Qty: 8 TAB | Refills: 0 | Status: SHIPPED | OUTPATIENT
Start: 2019-09-10 | End: 2019-09-13

## 2019-09-10 RX ADMIN — ONDANSETRON 4 MG: 2 INJECTION INTRAMUSCULAR; INTRAVENOUS at 18:22

## 2019-09-10 RX ADMIN — KETOROLAC TROMETHAMINE 15 MG: 15 INJECTION, SOLUTION INTRAMUSCULAR; INTRAVENOUS at 18:22

## 2019-09-10 NOTE — ED NOTES
I performed a brief history of the patient here in triage and I have determined that pt will need further treatment and evaluation from the main side ER physician. I have placed initial orders based on the history to help in expediting patients care. Renal colic no previous history of kidney stones. Pt seen by PCP yesterday, UA was positive for occult blood.     Visit Vitals  /88 (BP 1 Location: Left arm, BP Patient Position: At rest)   Pulse 74   Temp 97.8 °F (36.6 °C)   Resp 18   Ht 5' 5\" (1.651 m)   Wt 67.6 kg (149 lb)   SpO2 97%   BMI 24.79 kg/m²       MENA Duncan  09/10/19

## 2019-09-10 NOTE — ED NOTES
Pt c/o left side pain , which has improved since arrival to ED. Pt c/o constant dull pain at this time. resp relaxed. Color pink . Pt aware of ct scan ordered.

## 2019-09-10 NOTE — ED TRIAGE NOTES
The pt arrived today with a CC of left side pain. She went to her pcp yesterday and he thought she may have a kidney stone. He prescribed bactrim and robaxin. She stated the pain is not getting any better.

## 2019-09-10 NOTE — ED PROVIDER NOTES
EMERGENCY DEPARTMENT HISTORY AND PHYSICAL EXAM    Date: 9/10/2019  Patient Name: Ricardo Simmons    History of Presenting Illness     Chief Complaint   Patient presents with    Flank Pain         History Provided By: patient        Additional History (Context): Ricardo Simmons is a 68-year-old female presents to the emergency room with left flank pain. Patient states she has had this pain for the past few days, went to primary care physician yesterday and PCP was concerned about a urinary tract infection so was put on antibiotics. Patient states pain has not improved so physician recommended coming to the emergency room for CAT scan. Patient denies fever, chills, chest pain, shortness of breath, abdominal pain, nausea vomiting, diarrhea, constipation, vaginal discharge, hematuria or dysuria. Patient states she has been urinating more frequently. Patient has not taken anything today for the pain. PCP: Sarai Barksdale MD    Current Outpatient Medications   Medication Sig Dispense Refill    multivitamin (ONE A DAY) tablet Take 1 Tab by mouth daily.  cyanocobalamin (VITAMIN B-12) 1,000 mcg tablet Take 1,000 mcg by mouth daily.  acetaminophen (TYLENOL EXTRA STRENGTH) 500 mg tablet Take  by mouth every six (6) hours as needed for Pain.  zolpidem (AMBIEN) 5 mg tablet Take 5 mg by mouth nightly as needed for Sleep.  traMADol (ULTRAM) 50 mg tablet Take 1 Tab by mouth every six (6) hours as needed for Pain. Max Daily Amount: 200 mg. Do not take until after surgery (for acute post operative pain) 40 Tab 0    HYDROcodone-acetaminophen (NORCO) 5-325 mg per tablet Take 1 Tab by mouth every eight (8) hours as needed for Pain.  HYDROmorphone (DILAUDID) 2 mg tablet Take 1 Tab by mouth every four (4) hours as needed for Pain. Max Daily Amount: 12 mg. 40 Tab 0    ibuprofen (MOTRIN) 400 mg tablet Take  by mouth every six (6) hours as needed for Pain.          Past History     Past Medical History:  Past Medical History:   Diagnosis Date    Bell's palsy     Cardiac nuclear imaging test, normal 07/13/2012    Low risk. No ischemia or prior infarction. No RWMA. EF 64%. Nondiagnostic EKG on EST. Ex time 10 min 41 sec.  Lump     Palm of the left hand       Past Surgical History:  Past Surgical History:   Procedure Laterality Date    HAND/FINGER SURGERY UNLISTED Left     cyst removal    HX APPENDECTOMY      HX CHOLECYSTECTOMY      HX GASTRIC BYPASS      HX HYSTERECTOMY  2008       Family History:  Family History   Problem Relation Age of Onset    Diabetes Mother     Hypertension Mother     Osteoporosis Mother        Social History:  Social History     Tobacco Use    Smoking status: Never Smoker    Smokeless tobacco: Never Used   Substance Use Topics    Alcohol use: No     Alcohol/week: 0.0 standard drinks    Drug use: Never       Allergies:  No Known Allergies      Review of Systems       Review of Systems   Constitutional: Negative for chills and fever. HENT: Negative for nasal congestion, sore throat, rhinorrhea  Eyes: Negative. Respiratory: Negative for cough and negative for shortness of breath. Cardiovascular: Negative for chest pain and palpitations. Gastrointestinal: neg for abdominal pain, constipation, diarrhea, nausea and vomiting. Genitourinary: Negative. Negative for difficulty urinating and pos for flank pain. Musculoskeletal: Negative for back pain. Negative for gait problem and neck pain. Skin: Negative. Allergic/Immunologic: Negative. Neurological: Negative for dizziness, weakness, numbness and headaches. Psychiatric/Behavioral: Negative. All other systems reviewed and are negative.   All Other Systems Negative  Physical Exam     Vitals:    09/10/19 1805   BP: 178/88   Pulse: 74   Resp: 18   Temp: 97.8 °F (36.6 °C)   SpO2: 97%   Weight: 67.6 kg (149 lb)   Height: 5' 5\" (1.651 m)     Physical Exam   Constitutional: She is oriented to person, place, and time. She appears well-developed and well-nourished. No distress. HENT:   Head: Normocephalic and atraumatic. Nose: Nose normal.   Eyes: Pupils are equal, round, and reactive to light. Conjunctivae and EOM are normal.   Neck: Normal range of motion. Neck supple. Cardiovascular: Normal rate and regular rhythm. Pulmonary/Chest: Effort normal and breath sounds normal. No accessory muscle usage. No respiratory distress. She has no decreased breath sounds. Chest wall is not dull to percussion. She exhibits no mass, no tenderness, no bony tenderness, no laceration, no crepitus, no edema, no deformity, no swelling and no retraction. Skin intact   Abdominal: Soft. Musculoskeletal: Normal range of motion. Neurological: She is alert and oriented to person, place, and time. No cranial nerve deficit. Coordination normal.   Skin: Skin is warm. No rash noted. She is not diaphoretic. Psychiatric: She has a normal mood and affect. Her behavior is normal.   Nursing note and vitals reviewed. Diagnostic Study Results     Labs -     Recent Results (from the past 12 hour(s))   CBC WITH AUTOMATED DIFF    Collection Time: 09/10/19  6:20 PM   Result Value Ref Range    WBC 9.3 4.6 - 13.2 K/uL    RBC 4.25 4.20 - 5.30 M/uL    HGB 11.7 (L) 12.0 - 16.0 g/dL    HCT 36.9 35.0 - 45.0 %    MCV 86.8 74.0 - 97.0 FL    MCH 27.5 24.0 - 34.0 PG    MCHC 31.7 31.0 - 37.0 g/dL    RDW 15.1 (H) 11.6 - 14.5 %    PLATELET 970 519 - 300 K/uL    MPV 10.9 9.2 - 11.8 FL    NEUTROPHILS 42 40 - 73 %    LYMPHOCYTES 47 21 - 52 %    MONOCYTES 9 3 - 10 %    EOSINOPHILS 2 0 - 5 %    BASOPHILS 0 0 - 2 %    ABS. NEUTROPHILS 3.9 1.8 - 8.0 K/UL    ABS. LYMPHOCYTES 4.4 (H) 0.9 - 3.6 K/UL    ABS. MONOCYTES 0.8 0.05 - 1.2 K/UL    ABS. EOSINOPHILS 0.1 0.0 - 0.4 K/UL    ABS.  BASOPHILS 0.0 0.0 - 0.1 K/UL    DF AUTOMATED     METABOLIC PANEL, COMPREHENSIVE    Collection Time: 09/10/19  6:20 PM   Result Value Ref Range    Sodium 138 136 - 145 mmol/L    Potassium 3.4 (L) 3.5 - 5.5 mmol/L    Chloride 103 100 - 111 mmol/L    CO2 27 21 - 32 mmol/L    Anion gap 8 3.0 - 18 mmol/L    Glucose 88 74 - 99 mg/dL    BUN 11 7.0 - 18 MG/DL    Creatinine 1.02 0.6 - 1.3 MG/DL    BUN/Creatinine ratio 11 (L) 12 - 20      GFR est AA >60 >60 ml/min/1.73m2    GFR est non-AA 55 (L) >60 ml/min/1.73m2    Calcium 10.1 8.5 - 10.1 MG/DL    Bilirubin, total 0.3 0.2 - 1.0 MG/DL    ALT (SGPT) 27 13 - 56 U/L    AST (SGOT) 25 10 - 38 U/L    Alk.  phosphatase 94 45 - 117 U/L    Protein, total 8.4 (H) 6.4 - 8.2 g/dL    Albumin 4.8 3.4 - 5.0 g/dL    Globulin 3.6 2.0 - 4.0 g/dL    A-G Ratio 1.3 0.8 - 1.7     URINALYSIS W/ RFLX MICROSCOPIC    Collection Time: 09/10/19  6:20 PM   Result Value Ref Range    Color YELLOW      Appearance CLEAR      Specific gravity 1.005 1.005 - 1.030      pH (UA) 6.0 5.0 - 8.0      Protein NEGATIVE  NEG mg/dL    Glucose NEGATIVE  NEG mg/dL    Ketone NEGATIVE  NEG mg/dL    Bilirubin NEGATIVE  NEG      Blood TRACE (A) NEG      Urobilinogen 0.2 0.2 - 1.0 EU/dL    Nitrites NEGATIVE  NEG      Leukocyte Esterase TRACE (A) NEG     CARDIAC PANEL,(CK, CKMB & TROPONIN)    Collection Time: 09/10/19  6:20 PM   Result Value Ref Range     (H) 26 - 192 U/L    CK - MB 4.6 (H) <3.6 ng/ml    CK-MB Index 2.0 0.0 - 4.0 %    Troponin-I, QT <0.02 0.0 - 0.045 NG/ML   URINE MICROSCOPIC ONLY    Collection Time: 09/10/19  6:20 PM   Result Value Ref Range    WBC 0 to 3 0 - 4 /hpf    RBC 0 to 3 0 - 5 /hpf    Epithelial cells 1+ 0 - 5 /lpf   D DIMER    Collection Time: 09/10/19  6:20 PM   Result Value Ref Range    D DIMER 0.32 <0.46 ug/ml(FEU)   EKG, 12 LEAD, INITIAL    Collection Time: 09/10/19  6:32 PM   Result Value Ref Range    Ventricular Rate 59 BPM    Atrial Rate 59 BPM    P-R Interval 160 ms    QRS Duration 80 ms    Q-T Interval 412 ms    QTC Calculation (Bezet) 407 ms    Calculated P Axis 57 degrees    Calculated R Axis 16 degrees    Calculated T Axis 23 degrees Diagnosis       Sinus bradycardia  Otherwise normal ECG  When compared with ECG of 06-NOV-2018 14:19,  Nonspecific T wave abnormality no longer evident in Anterolateral leads         Radiologic Studies -   XR CHEST PORT    (Results Pending)     CT Results  (Last 48 hours)    None        CXR Results  (Last 48 hours)    None            Medical Decision Making   I am the first provider for this patient. I reviewed the vital signs, available nursing notes, past medical history, past surgical history, family history and social history. Vital Signs-Reviewed the patient's vital signs. Records Reviewed: Nursing notes, old medical records and any previous labs, imaging, visits, consultations pertinent to patient care    Procedures:  Procedures    Provider Notes (Medical Decision Making): Vital signs stable, labs including urine unremarkable. Patient appears well, not septic or toxic. Physical exam reassuring, no abdominal pain, lungs CTA. CT c/w right hydroureter. Otherwise discussed all results with patient, no acute pathology. No evidence of sbo. Will d/c home with few tabs of tylenol 3, pt cannot take NSAIDS po. Will f/u and strict return sx given. Pain has resolved at this time. MED RECONCILIATION:  No current facility-administered medications for this encounter. Current Outpatient Medications   Medication Sig    multivitamin (ONE A DAY) tablet Take 1 Tab by mouth daily.  cyanocobalamin (VITAMIN B-12) 1,000 mcg tablet Take 1,000 mcg by mouth daily.  acetaminophen (TYLENOL EXTRA STRENGTH) 500 mg tablet Take  by mouth every six (6) hours as needed for Pain.  zolpidem (AMBIEN) 5 mg tablet Take 5 mg by mouth nightly as needed for Sleep.  traMADol (ULTRAM) 50 mg tablet Take 1 Tab by mouth every six (6) hours as needed for Pain. Max Daily Amount: 200 mg.  Do not take until after surgery (for acute post operative pain)    HYDROcodone-acetaminophen (NORCO) 5-325 mg per tablet Take 1 Tab by mouth every eight (8) hours as needed for Pain.  HYDROmorphone (DILAUDID) 2 mg tablet Take 1 Tab by mouth every four (4) hours as needed for Pain. Max Daily Amount: 12 mg.  ibuprofen (MOTRIN) 400 mg tablet Take  by mouth every six (6) hours as needed for Pain. Disposition:  home    DISCHARGE NOTE:     Pt has been reexamined. Patient has no new complaints, changes, or physical findings. Care plan outlined and precautions discussed. Discussed proper way to take medications. Discussed treatment plan, return precautions, symptomatic relief, and expected time to improvement. All questions answered. Patient is stable for discharge and outpatient management. Patient is ready to go home. Follow-up Information    None         Current Discharge Medication List                Diagnosis     Clinical Impression: No diagnosis found. Dictation disclaimer:  Please note that this dictation was completed with [employer], the computer voice recognition software. Quite often unanticipated grammatical, syntax, homophones, and other interpretive errors are inadvertently transcribed by the computer software. Please disregard these errors. Please excuse any errors that have escaped final proofreading.

## 2019-09-11 NOTE — DISCHARGE INSTRUCTIONS
Patient Education        Flank Pain: Care Instructions  Your Care Instructions  Flank pain is pain on the side of the back just below the rib cage and above the waist. It can be on one or both sides. Flank pain has many possible causes, including a kidney stone, a urinary tract infection, or back strain. Flank pain may get better on its own. But don't ignore new symptoms, such as fever, nausea and vomiting, urination problems, pain that gets worse, and dizziness. These may be signs of a more serious problem. You may have to have tests or other treatment. Follow-up care is a key part of your treatment and safety. Be sure to make and go to all appointments, and call your doctor if you are having problems. It's also a good idea to know your test results and keep a list of the medicines you take. How can you care for yourself at home? · Rest until you feel better. · Take pain medicines exactly as directed. ? If the doctor gave you a prescription medicine for pain, take it as prescribed. ? If you are not taking a prescription pain medicine, ask your doctor if you can take an over-the-counter pain medicine, such as acetaminophen (Tylenol), ibuprofen (Advil, Motrin), or naproxen (Aleve). Read and follow all instructions on the label. · If your doctor prescribed antibiotics, take them as directed. Do not stop taking them just because you feel better. You need to take the full course of antibiotics. · To apply heat, put a warm water bottle, a heating pad set on low, or a warm cloth on the painful area. Do not go to sleep with a heating pad on your skin. · To prevent dehydration, drink plenty of fluids, enough so that your urine is light yellow or clear like water. Choose water and other caffeine-free clear liquids until you feel better. If you have kidney, heart, or liver disease and have to limit fluids, talk with your doctor before you increase the amount of fluids you drink. When should you call for help?   Call your doctor now or seek immediate medical care if:    · Your flank pain gets worse.     · You have new symptoms, such as fever, nausea, or vomiting.     · You have symptoms of a urinary problem. For example:  ? You have blood or pus in your urine. ? You have chills or body aches. ? It hurts to urinate. ? You have groin or belly pain.    Watch closely for changes in your health, and be sure to contact your doctor if you do not get better as expected. Where can you learn more? Go to http://rolando-kristen.info/. Enter S191 in the search box to learn more about \"Flank Pain: Care Instructions. \"  Current as of: September 23, 2018  Content Version: 12.1  © 3564-7538 [employer]. Care instructions adapted under license by [employer] (which disclaims liability or warranty for this information). If you have questions about a medical condition or this instruction, always ask your healthcare professional. [place] [address] any warranty or liability for your use of this information.

## 2019-09-12 LAB
ATRIAL RATE: 59 BPM
CALCULATED P AXIS, ECG09: 57 DEGREES
CALCULATED R AXIS, ECG10: 16 DEGREES
CALCULATED T AXIS, ECG11: 23 DEGREES
DIAGNOSIS, 93000: NORMAL
P-R INTERVAL, ECG05: 160 MS
Q-T INTERVAL, ECG07: 412 MS
QRS DURATION, ECG06: 80 MS
QTC CALCULATION (BEZET), ECG08: 407 MS
VENTRICULAR RATE, ECG03: 59 BPM

## 2019-11-16 ENCOUNTER — HOSPITAL ENCOUNTER (OUTPATIENT)
Dept: CT IMAGING | Age: 59
Discharge: HOME | End: 2019-11-16
Attending: INTERNAL MEDICINE
Payer: COMMERCIAL

## 2019-11-16 DIAGNOSIS — R10.32 LLQ ABDOMINAL PAIN: ICD-10-CM

## 2019-11-16 DIAGNOSIS — K62.5 RECTAL BLEEDING: ICD-10-CM

## 2019-11-16 LAB — CREAT UR-MCNC: 0.7 MG/DL (ref 0.6–1.3)

## 2019-11-16 PROCEDURE — 74177 CT ABD & PELVIS W/CONTRAST: CPT

## 2019-11-16 PROCEDURE — 82565 ASSAY OF CREATININE: CPT

## 2019-11-16 PROCEDURE — 74011636320 HC RX REV CODE- 636/320: Performed by: INTERNAL MEDICINE

## 2019-11-16 RX ADMIN — IOPAMIDOL 100 ML: 612 INJECTION, SOLUTION INTRAVENOUS at 14:12

## 2021-03-03 NOTE — PROGRESS NOTES
Ellen Balderas  1960   Chief Complaint   Patient presents with    Shoulder Pain     Right shoulder        HISTORY OF PRESENT ILLNESS  Ellen Balderas is a 56 y.o. female who presents today for reevaluation of right shoulder. Patient rates pain as 6/10 today. Pt is s/p ORIF Right humerus on 11/5/18. Pain has returned in the last month. No new injury. Having limited ROM with internal and external ROM. Having shoulder blade pain, feels like the pain is like \"putting her finger in an electrical socket\". Pain in the shoulder with certain movements. Has hx of gastric bypass surgery. Patient denies any fever, chills, chest pain, shortness of breath or calf pain. The remainder of the review of systems is negative. There are no new illness or injuries to report since last seen in the office. There are no changes to medications, allergies, family or social history. Pain Assessment  3/4/2021   Location of Pain Shoulder   Location Modifiers Right   Severity of Pain 6   Quality of Pain Sharp; Aching;Burning   Quality of Pain Comment -   Duration of Pain Persistent   Duration of Pain Comment -   Frequency of Pain Constant   Date Pain First Started (No Data)   Aggravating Factors Bending;Stretching   Aggravating Factors Comment -   Limiting Behavior Yes   Relieving Factors Rest   Relieving Factors Comment -   Result of Injury -   Work-Related Injury -   Type of Injury -       PHYSICAL EXAM:   Visit Vitals  /84 (BP 1 Location: Right upper arm, BP Patient Position: Sitting, BP Cuff Size: Large adult)   Pulse 91   Temp 98.2 °F (36.8 °C) (Temporal)   Resp 16   Ht 5' 5\" (1.651 m)   Wt 166 lb (75.3 kg)   SpO2 99%   BMI 27.62 kg/m²     The patient is a well-developed, well-nourished female   in no acute distress. The patient is alert and oriented times three. The patient is alert and oriented times three.  Mood and affect are normal.  LYMPHATIC: lymph nodes are not enlarged and are within normal limits  SKIN: normal in color and non tender to palpation. There are no bruises or abrasions noted. NEUROLOGICAL: Motor sensory exam is within normal limits. Reflexes are equal bilaterally. There is normal sensation to pinprick and light touch  MUSCULOSKELETAL:  Examination Right shoulder   Skin Intact   AC joint tenderness -   Biceps tenderness -   Forward flexion/Elevation    Active abduction    Glenohumeral abduction 90   External rotation ROM 60   Internal rotation ROM 30   Apprehension -   Tatos Relocation -   Jerk -   Load and Shift -   Obriens -   Speeds -   Impingement sign -   Supraspinatus/Empty Can -, 5/5   External Rotation Strength -, 5/5   Lift Off/Belly Press -, 5/5   Neurovascular Intact     Examination Neck   Skin Intact   Tenderness, Paracervical +   Paracervical spasms  +   Flexion Decreased 25%   Extension Decreased 25%   Lateral bend left Normal   Lateral bend right Normal   Masses -   Spurling sign +   Biceps reflex Normal   Triceps reflex Normal   Brachioradialis reflex Normal   Sensation Normal       IMAGING: XR of right humerus with 2 views obtained in the office dated 3/04/2021 was reviewed and read by Dr. Liz Ybarra: Excellent position of the hardware but unable to assess shoulder joint completely. XR of cervical spine with 2 views obtained in the office dated 3/04/2021 was reviewed and read by Dr. Liz Ybarra: Loss of cervical lordosis. Spondylotic changes at  C4-C5 and C5-C6. IMPRESSION:      ICD-10-CM ICD-9-CM    1. Cervical spondylosis  M47.812 721.0 baclofen (LIORESAL) 10 mg tablet      REFERRAL TO PHYSICAL THERAPY   2. Chronic right shoulder pain  M25.511 719.41 CANCELED: AMB POC XRAY, SHOULDER; COMPLETE, 2+    G89.29 338.29    3. Right arm pain  M79.601 729.5 AMB POC XRAY; HUMERUS, 2+ VIEWS   4. Neck pain  M54.2 723.1 AMB POC XRAY, SPINE, CERVICAL; 2 OR 3        PLAN:   1. Pt presents today with right shoulder blade pain and symptoms c/w cervical spondylosis.  Will set up with PT for the neck. Was also given prescription for Baclofen today. Risk factors include: no NSAIDs  2. No ultrasound exam indicated today  3. No cortisone injection indicated today   4. Yes Physical/Occupational Therapy indicated today  5. No diagnostic test indicated today:   6. No durable medical equipment indicated today  7. No referral indicated today   8. Yes medications indicated today: BACLOFEN  9. No Narcotic indicated today        RTC 3-4 weeks      Scribed by Phyllistine Carey Boyd) as dictated by Ora Meng MD    I, Dr. Ora Meng, confirm that all documentation is accurate.     Ora Meng M.D.   [facility]

## 2021-03-04 ENCOUNTER — OFFICE VISIT (OUTPATIENT)
Dept: ORTHOPEDIC SURGERY | Age: 61
End: 2021-03-04
Payer: COMMERCIAL

## 2021-03-04 VITALS
HEIGHT: 65 IN | TEMPERATURE: 98.2 F | WEIGHT: 166 LBS | HEART RATE: 91 BPM | BODY MASS INDEX: 27.66 KG/M2 | DIASTOLIC BLOOD PRESSURE: 84 MMHG | RESPIRATION RATE: 16 BRPM | SYSTOLIC BLOOD PRESSURE: 119 MMHG | OXYGEN SATURATION: 99 %

## 2021-03-04 DIAGNOSIS — G89.29 CHRONIC RIGHT SHOULDER PAIN: ICD-10-CM

## 2021-03-04 DIAGNOSIS — M25.511 CHRONIC RIGHT SHOULDER PAIN: ICD-10-CM

## 2021-03-04 DIAGNOSIS — M79.601 RIGHT ARM PAIN: ICD-10-CM

## 2021-03-04 DIAGNOSIS — M47.812 CERVICAL SPONDYLOSIS: Primary | ICD-10-CM

## 2021-03-04 DIAGNOSIS — M54.2 NECK PAIN: ICD-10-CM

## 2021-03-04 PROCEDURE — 73060 X-RAY EXAM OF HUMERUS: CPT | Performed by: ORTHOPAEDIC SURGERY

## 2021-03-04 PROCEDURE — 72040 X-RAY EXAM NECK SPINE 2-3 VW: CPT | Performed by: ORTHOPAEDIC SURGERY

## 2021-03-04 PROCEDURE — 99214 OFFICE O/P EST MOD 30 MIN: CPT | Performed by: ORTHOPAEDIC SURGERY

## 2021-03-04 RX ORDER — BACLOFEN 10 MG/1
10 TABLET ORAL 2 TIMES DAILY
Qty: 60 TAB | Refills: 1 | Status: SHIPPED | OUTPATIENT
Start: 2021-03-04 | End: 2022-08-23

## 2021-03-04 RX ORDER — MELOXICAM 15 MG/1
15 TABLET ORAL
Qty: 30 TAB | Refills: 1 | Status: CANCELLED | OUTPATIENT
Start: 2021-03-04

## 2021-04-24 ENCOUNTER — HOSPITAL ENCOUNTER (OUTPATIENT)
Dept: GENERAL RADIOLOGY | Age: 61
Discharge: HOME | End: 2021-04-24
Payer: COMMERCIAL

## 2021-04-24 DIAGNOSIS — R06.02 SHORTNESS OF BREATH: ICD-10-CM

## 2021-04-24 PROCEDURE — 71046 X-RAY EXAM CHEST 2 VIEWS: CPT

## 2021-07-06 ENCOUNTER — DOCUMENTATION ONLY (OUTPATIENT)
Dept: PULMONOLOGY | Age: 61
End: 2021-07-06

## 2021-07-06 NOTE — PROGRESS NOTES
Called pt to try to schedule a new pt apt ref by [facility],  Pt was very upset that she had not gotten a call 2 months ago. I explained that our scheduled were booked. Pt used profanity and stated she was not coming to us.

## 2021-07-17 ENCOUNTER — APPOINTMENT (OUTPATIENT)
Dept: GENERAL RADIOLOGY | Age: 61
End: 2021-07-17
Attending: EMERGENCY MEDICINE
Payer: COMMERCIAL

## 2021-07-17 ENCOUNTER — HOSPITAL ENCOUNTER (EMERGENCY)
Age: 61
Discharge: HOME | End: 2021-07-17
Attending: EMERGENCY MEDICINE
Payer: COMMERCIAL

## 2021-07-17 ENCOUNTER — APPOINTMENT (OUTPATIENT)
Dept: VASCULAR SURGERY | Age: 61
End: 2021-07-17
Attending: PHYSICIAN ASSISTANT
Payer: COMMERCIAL

## 2021-07-17 VITALS
HEIGHT: 65 IN | HEART RATE: 72 BPM | TEMPERATURE: 98.6 F | DIASTOLIC BLOOD PRESSURE: 93 MMHG | BODY MASS INDEX: 27.32 KG/M2 | OXYGEN SATURATION: 98 % | RESPIRATION RATE: 18 BRPM | SYSTOLIC BLOOD PRESSURE: 146 MMHG | WEIGHT: 164 LBS

## 2021-07-17 DIAGNOSIS — R21 RASH: Primary | ICD-10-CM

## 2021-07-17 DIAGNOSIS — M79.89 LEFT LEG SWELLING: ICD-10-CM

## 2021-07-17 PROCEDURE — 99283 EMERGENCY DEPT VISIT LOW MDM: CPT

## 2021-07-17 PROCEDURE — 73610 X-RAY EXAM OF ANKLE: CPT

## 2021-07-17 PROCEDURE — 93971 EXTREMITY STUDY: CPT

## 2021-07-17 RX ORDER — HYDROCORTISONE 0.5 %
OINTMENT (GRAM) TOPICAL 3 TIMES DAILY
Qty: 1 TUBE | Refills: 0 | Status: SHIPPED | OUTPATIENT
Start: 2021-07-17 | End: 2021-07-27

## 2021-07-17 NOTE — ROUTINE PROCESS
Janell Mancera is a 59 y.o. female that was discharged in stable. Pt was accompanied by self. Pt is driving. The patients diagnosis, condition and treatment were explained to  patient and aftercare instructions were given. The patient verbalized understanding. Patient armband removed and shredded.

## 2021-07-17 NOTE — ED PROVIDER NOTES
EMERGENCY DEPARTMENT HISTORY AND PHYSICAL EXAM    3:40 PM  Date: 7/17/2021  Patient Name: Brigitte Argueta    History of Presenting Illness       History Provided By:     HPI: Brigitte Argueta is a 59 y.o. female with history of hysterectomy appendectomy cholecystectomy presents with bilateral leg rash and mild left lower extremity swelling. Rash started yesterday. Patient denies using any new clothes, soaps or any other instigating factors. Rash is not itchy. No left leg pain or injury. PCP: Amy Weinberg MD    Past History     Past Medical History:  Past Medical History:   Diagnosis Date    Bell's palsy     Cardiac nuclear imaging test, normal 07/13/2012    Low risk. No ischemia or prior infarction. No RWMA. EF 64%. Nondiagnostic EKG on EST. Ex time 10 min 41 sec.  Lump     Palm of the left hand       Past Surgical History:  Past Surgical History:   Procedure Laterality Date    HAND/FINGER SURGERY UNLISTED Left     cyst removal    HX APPENDECTOMY      HX CHOLECYSTECTOMY      HX GASTRIC BYPASS      HX HYSTERECTOMY  2008       Family History:  Family History   Problem Relation Age of Onset    Diabetes Mother     Hypertension Mother     Osteoporosis Mother        Social History:  Social History     Tobacco Use    Smoking status: Never Smoker    Smokeless tobacco: Never Used   Substance Use Topics    Alcohol use: No     Alcohol/week: 0.0 standard drinks    Drug use: Never       Allergies:  No Known Allergies    Review of Systems   Review of Systems   Constitutional: Negative for activity change, appetite change and chills. HENT: Negative for congestion, ear discharge, ear pain and sore throat. Eyes: Negative for photophobia and pain. Respiratory: Negative for cough and choking. Cardiovascular: Negative for palpitations and leg swelling. Gastrointestinal: Negative for anal bleeding and rectal pain. Endocrine: Negative for polydipsia and polyuria.    Genitourinary: Negative for genital sores and urgency. Musculoskeletal: Negative for arthralgias and myalgias. Skin: Positive for rash. Neurological: Negative for dizziness, seizures and speech difficulty. Psychiatric/Behavioral: Negative for hallucinations, self-injury and suicidal ideas. Physical Exam     Patient Vitals for the past 12 hrs:   Temp Pulse Resp BP SpO2   07/17/21 1523 98.6 °F (37 °C) 72 18 (!) 146/93 98 %       Physical Exam  Vitals and nursing note reviewed. Constitutional:       Appearance: She is well-developed. HENT:      Head: Normocephalic and atraumatic. Eyes:      General:         Right eye: No discharge. Left eye: No discharge. Cardiovascular:      Rate and Rhythm: Normal rate and regular rhythm. Heart sounds: Normal heart sounds. No murmur heard. Pulmonary:      Effort: Pulmonary effort is normal. No respiratory distress. Breath sounds: Normal breath sounds. No stridor. No wheezing or rales. Chest:      Chest wall: No tenderness. Abdominal:      General: Bowel sounds are normal. There is no distension. Palpations: Abdomen is soft. Tenderness: There is no abdominal tenderness. There is no guarding or rebound. Musculoskeletal:         General: Normal range of motion. Cervical back: Normal range of motion and neck supple. Left lower leg: Edema present. Comments: Left lower extremity edema worse on the ankle   Skin:     General: Skin is warm and dry. Findings: Rash present. Comments: Macular erythematous lesions     Neurological:      Mental Status: She is alert and oriented to person, place, and time. Diagnostic Study Results     Labs -  No results found for this or any previous visit (from the past 12 hour(s)). Radiologic Studies -   XR ANKLE LT MIN 3 V    Result Date: 7/17/2021  No acute osseous findings. No soft tissue gas.         Medical Decision Making     ED Course: Progress Notes, Reevaluation, and Consults:    3:40 PM Initial assessment performed. The patients presenting problems have been discussed, and they/their family are in agreement with the care plan formulated and outlined with them. I have encouraged them to ask questions as they arise throughout their visit. Provider Notes (Medical Decision Making):   Patient presents with bilateral lower extremity erythematous macular rash  Some mild left lower leg swelling more pronounced at the ankle  Full range of movement bilateral lower extremities  Neurovascularly intact bilateral lower extremities  X-ray ankle no acute osseous finding  Negative DVT study left  Clinical appearance not consistent with cellulitis  Be discharged with PMD follow-up  Clinical pression dermatitis  Patient will be given hydrocortisone cream and advised follow-up with dermatology if no improvement            Vital Signs-Reviewed the patient's vital signs. Reviewed pt's pulse ox reading. Records Reviewed: old medical records  -I am the first provider for this patient.  -I reviewed the vital signs, available nursing notes, past medical history, past surgical history, family history and social history. Current Outpatient Medications   Medication Sig Dispense Refill    hydrocortisone (CORTIZONE) 0.5 % ointment Apply  to affected area three (3) times daily for 10 days. Apply to affected area 1 Tube 0    baclofen (LIORESAL) 10 mg tablet Take 1 Tab by mouth two (2) times a day. 60 Tab 1    traMADol (ULTRAM) 50 mg tablet Take 1 Tab by mouth every six (6) hours as needed for Pain. Max Daily Amount: 200 mg. Do not take until after surgery (for acute post operative pain) 40 Tab 0    HYDROcodone-acetaminophen (NORCO) 5-325 mg per tablet Take 1 Tab by mouth every eight (8) hours as needed for Pain.  HYDROmorphone (DILAUDID) 2 mg tablet Take 1 Tab by mouth every four (4) hours as needed for Pain.  Max Daily Amount: 12 mg. 40 Tab 0    multivitamin (ONE A DAY) tablet Take 1 Tab by mouth daily.  cyanocobalamin (VITAMIN B-12) 1,000 mcg tablet Take 1,000 mcg by mouth daily.  acetaminophen (TYLENOL EXTRA STRENGTH) 500 mg tablet Take  by mouth every six (6) hours as needed for Pain.  ibuprofen (MOTRIN) 400 mg tablet Take  by mouth every six (6) hours as needed for Pain.  zolpidem (AMBIEN) 5 mg tablet Take 5 mg by mouth nightly as needed for Sleep. Clinical Impression     Clinical Impression:   1. Rash    2. Left leg swelling        Disposition: This note was dictated utilizing voice recognition software which may lead to typographical errors. I apologize in advance if the situation occurs. If questions arise please do not hesitate to contact me or call our department.     Anthony Melendrez MD  3:40 PM

## 2021-07-17 NOTE — ED TRIAGE NOTES
Pt reports noticing rash on right leg yesterday, reports rash is spreading despite taking benadryl. Pt states legs are painful to touch and pt has swelling in right ankle.

## 2021-07-17 NOTE — ED NOTES
L calf swelling and pain, non-pruritic rash. Notes b/l legs with cramps. Vascular tech notified of doppler study. I performed a brief evaluation, including history and physical, of the patient here in triage and I have determined that pt will need further treatment and evaluation from the main side ER physician. I have placed initial orders to help in expediting patients care.      July 17, 2021 at 3:23 PM - MENA Paz

## 2021-09-22 ENCOUNTER — TRANSCRIBE ORDER (OUTPATIENT)
Dept: SCHEDULING | Age: 61
End: 2021-09-22

## 2021-09-22 DIAGNOSIS — Z12.31 SCREENING MAMMOGRAM, ENCOUNTER FOR: Primary | ICD-10-CM

## 2021-09-22 DIAGNOSIS — Z13.820 SCREENING FOR OSTEOPOROSIS: Primary | ICD-10-CM

## 2021-10-09 ENCOUNTER — HOSPITAL ENCOUNTER (OUTPATIENT)
Dept: MAMMOGRAPHY | Age: 61
Discharge: HOME | End: 2021-10-09
Payer: COMMERCIAL

## 2021-10-09 DIAGNOSIS — Z12.31 SCREENING MAMMOGRAM, ENCOUNTER FOR: ICD-10-CM

## 2021-10-09 PROCEDURE — 77063 BREAST TOMOSYNTHESIS BI: CPT

## 2021-10-20 ENCOUNTER — HOSPITAL ENCOUNTER (OUTPATIENT)
Dept: BONE DENSITY | Age: 61
Discharge: HOME | End: 2021-10-20
Payer: COMMERCIAL

## 2021-10-20 DIAGNOSIS — Z13.820 SCREENING FOR OSTEOPOROSIS: ICD-10-CM

## 2021-10-20 PROCEDURE — 77080 DXA BONE DENSITY AXIAL: CPT

## 2022-01-24 NOTE — PROGRESS NOTES
Bedside and Verbal shift change report given to Dolly Grullon RN (oncoming nurse) by Elvira Freeman RN (offgoing nurse). Report included the following information SBAR, Kardex,  Intake/Output and MAR. Never

## 2022-03-18 PROBLEM — S42.291B: Status: ACTIVE | Noted: 2018-11-04

## 2022-08-04 ENCOUNTER — TRANSCRIBE ORDER (OUTPATIENT)
Dept: SCHEDULING | Age: 62
End: 2022-08-04

## 2022-08-04 DIAGNOSIS — R60.0 LOCALIZED EDEMA: Primary | ICD-10-CM

## 2022-08-05 ENCOUNTER — HOSPITAL ENCOUNTER (OUTPATIENT)
Dept: VASCULAR SURGERY | Age: 62
Discharge: HOME | End: 2022-08-05
Attending: NURSE PRACTITIONER
Payer: COMMERCIAL

## 2022-08-05 DIAGNOSIS — R60.0 LOCALIZED EDEMA: ICD-10-CM

## 2022-08-05 PROCEDURE — 93971 EXTREMITY STUDY: CPT

## 2022-08-06 ENCOUNTER — HOSPITAL ENCOUNTER (OUTPATIENT)
Dept: LAB | Age: 62
Discharge: HOME | End: 2022-08-06
Payer: COMMERCIAL

## 2022-08-06 LAB
25(OH)D3 SERPL-MCNC: 24.9 NG/ML (ref 30–100)
ALBUMIN SERPL-MCNC: 4.1 G/DL (ref 3.4–5)
ALBUMIN/GLOB SERPL: 1.2 {RATIO} (ref 0.8–1.7)
ALP SERPL-CCNC: 126 U/L (ref 45–117)
ALT SERPL-CCNC: 69 U/L (ref 13–56)
ANION GAP SERPL CALC-SCNC: 6 MMOL/L (ref 3–18)
AST SERPL-CCNC: 59 U/L (ref 10–38)
BASOPHILS # BLD: 0 K/UL (ref 0–0.1)
BASOPHILS NFR BLD: 0 % (ref 0–2)
BILIRUB SERPL-MCNC: 0.5 MG/DL (ref 0.2–1)
BUN SERPL-MCNC: 15 MG/DL (ref 7–18)
BUN/CREAT SERPL: 19 (ref 12–20)
CALCIUM SERPL-MCNC: 9.2 MG/DL (ref 8.5–10.1)
CHLORIDE SERPL-SCNC: 108 MMOL/L (ref 100–111)
CHOLEST SERPL-MCNC: 181 MG/DL
CO2 SERPL-SCNC: 29 MMOL/L (ref 21–32)
CREAT SERPL-MCNC: 0.79 MG/DL (ref 0.6–1.3)
DIFFERENTIAL METHOD BLD: NORMAL
EOSINOPHIL # BLD: 0.1 K/UL (ref 0–0.4)
EOSINOPHIL NFR BLD: 1 % (ref 0–5)
ERYTHROCYTE [DISTWIDTH] IN BLOOD BY AUTOMATED COUNT: 13.6 % (ref 11.6–14.5)
GLOBULIN SER CALC-MCNC: 3.3 G/DL (ref 2–4)
GLUCOSE SERPL-MCNC: 96 MG/DL (ref 74–99)
HCT VFR BLD AUTO: 41.6 % (ref 35–45)
HDLC SERPL-MCNC: 66 MG/DL (ref 40–60)
HDLC SERPL: 2.7 {RATIO} (ref 0–5)
HGB BLD-MCNC: 13.2 G/DL (ref 12–16)
IMM GRANULOCYTES # BLD AUTO: 0 K/UL (ref 0–0.04)
IMM GRANULOCYTES NFR BLD AUTO: 0 % (ref 0–0.5)
LDLC SERPL CALC-MCNC: 93.2 MG/DL (ref 0–100)
LIPID PROFILE,FLP: ABNORMAL
LYMPHOCYTES # BLD: 3 K/UL (ref 0.9–3.6)
LYMPHOCYTES NFR BLD: 39 % (ref 21–52)
MCH RBC QN AUTO: 28.8 PG (ref 24–34)
MCHC RBC AUTO-ENTMCNC: 31.7 G/DL (ref 31–37)
MCV RBC AUTO: 90.8 FL (ref 78–100)
MONOCYTES # BLD: 0.6 K/UL (ref 0.05–1.2)
MONOCYTES NFR BLD: 7 % (ref 3–10)
NEUTS SEG # BLD: 3.9 K/UL (ref 1.8–8)
NEUTS SEG NFR BLD: 51 % (ref 40–73)
NRBC # BLD: 0 K/UL (ref 0–0.01)
NRBC BLD-RTO: 0 PER 100 WBC
PLATELET # BLD AUTO: 375 K/UL (ref 135–420)
PMV BLD AUTO: 11.7 FL (ref 9.2–11.8)
POTASSIUM SERPL-SCNC: 4.6 MMOL/L (ref 3.5–5.5)
PROT SERPL-MCNC: 7.4 G/DL (ref 6.4–8.2)
RBC # BLD AUTO: 4.58 M/UL (ref 4.2–5.3)
SODIUM SERPL-SCNC: 143 MMOL/L (ref 136–145)
T4 FREE SERPL-MCNC: 0.9 NG/DL (ref 0.7–1.5)
TRIGL SERPL-MCNC: 109 MG/DL (ref ?–150)
TSH SERPL DL<=0.05 MIU/L-ACNC: 2.13 UIU/ML (ref 0.36–3.74)
VLDLC SERPL CALC-MCNC: 21.8 MG/DL
WBC # BLD AUTO: 7.6 K/UL (ref 4.6–13.2)

## 2022-08-06 PROCEDURE — 85025 COMPLETE CBC W/AUTO DIFF WBC: CPT

## 2022-08-06 PROCEDURE — 84443 ASSAY THYROID STIM HORMONE: CPT

## 2022-08-06 PROCEDURE — 80061 LIPID PANEL: CPT

## 2022-08-06 PROCEDURE — 36415 COLL VENOUS BLD VENIPUNCTURE: CPT

## 2022-08-06 PROCEDURE — 84439 ASSAY OF FREE THYROXINE: CPT

## 2022-08-06 PROCEDURE — 82306 VITAMIN D 25 HYDROXY: CPT

## 2022-08-06 PROCEDURE — 80053 COMPREHEN METABOLIC PANEL: CPT

## 2022-08-09 ENCOUNTER — TRANSCRIBE ORDER (OUTPATIENT)
Dept: SCHEDULING | Age: 62
End: 2022-08-09

## 2022-08-09 DIAGNOSIS — R74.8 ELEVATED LIVER ENZYMES: Primary | ICD-10-CM

## 2022-08-19 ENCOUNTER — HOSPITAL ENCOUNTER (OUTPATIENT)
Dept: ULTRASOUND IMAGING | Age: 62
Discharge: HOME | End: 2022-08-19
Attending: NURSE PRACTITIONER
Payer: COMMERCIAL

## 2022-08-19 DIAGNOSIS — R74.8 ELEVATED LIVER ENZYMES: ICD-10-CM

## 2022-08-19 PROCEDURE — 76705 ECHO EXAM OF ABDOMEN: CPT

## 2022-08-23 ENCOUNTER — OFFICE VISIT (OUTPATIENT)
Dept: ORTHOPEDIC SURGERY | Age: 62
End: 2022-08-23
Payer: COMMERCIAL

## 2022-08-23 VITALS
WEIGHT: 168 LBS | OXYGEN SATURATION: 98 % | HEART RATE: 84 BPM | RESPIRATION RATE: 14 BRPM | HEIGHT: 65 IN | BODY MASS INDEX: 27.99 KG/M2

## 2022-08-23 DIAGNOSIS — M77.51 TENDINITIS OF RIGHT ANKLE: Primary | ICD-10-CM

## 2022-08-23 PROCEDURE — 99204 OFFICE O/P NEW MOD 45 MIN: CPT | Performed by: FAMILY MEDICINE

## 2022-08-23 RX ORDER — ATORVASTATIN CALCIUM 10 MG/1
10 TABLET, FILM COATED ORAL DAILY
COMMUNITY

## 2022-08-23 RX ORDER — PREDNISONE 10 MG/1
TABLET ORAL
Qty: 12 TABLET | Refills: 0 | Status: SHIPPED | OUTPATIENT
Start: 2022-08-23

## 2022-08-23 NOTE — LETTER
8/23/2022    Patient: Jeanne Rangel   YOB: 1960   Date of Visit: 8/23/2022     Tyler Alston NP  [facility] [phone] [id number]  Via Fax: [phone]    Dear Tyler Alston NP,      Thank you for referring Ms. Rick Diehl to [facility] [place] [address]. for evaluation. My notes for this consultation are attached. If you have questions, please do not hesitate to call me. I look forward to following your patient along with you.       Sincerely,    Gale Gee, DO

## 2022-08-23 NOTE — PROGRESS NOTES
HISTORY OF PRESENT ILLNESS    Jaleesa Wilder 1960 is a 57y.o. year old female comes in today as new patient for: right foot/ankle swell    Patients symptoms have been present for 1+ months. Pain level 5/10 \"whole foot, feels numby\". It has worsened with sit at work long period. Patient has tried:  rest/sleep and swelling resolves. Did have similar left ankle went to ER [id number] per notes. No known injury. IMAGING: Doppler right lo ext 8/5/2022 images reviewed and concur with:  No evidence of acute deep vein thrombosis in the right common femoral, femoral, popliteal, posterior tibial, and peroneal veins. The veins were imaged in the transverse and longitudinal planes. The vessels showed normal color filling and compressibility. Doppler interrogation showed phasic and spontaneous flow. Past Surgical History:   Procedure Laterality Date    HAND/FINGER SURGERY UNLISTED Left     cyst removal    HX APPENDECTOMY      HX CHOLECYSTECTOMY      HX GASTRIC BYPASS      HX HYSTERECTOMY  2008     Social History     Socioeconomic History    Marital status:    Tobacco Use    Smoking status: Never    Smokeless tobacco: Never   Substance and Sexual Activity    Alcohol use: No     Alcohol/week: 0.0 standard drinks    Drug use: Never      Current Outpatient Medications   Medication Sig Dispense Refill    atorvastatin (LIPITOR) 10 mg tablet Take 10 mg by mouth daily. multivitamin (ONE A DAY) tablet Take 1 Tab by mouth daily. cyanocobalamin 1,000 mcg tablet Take 1,000 mcg by mouth daily. zolpidem (AMBIEN) 5 mg tablet Take 5 mg by mouth nightly as needed for Sleep. baclofen (LIORESAL) 10 mg tablet Take 1 Tab by mouth two (2) times a day. (Patient not taking: Reported on 8/23/2022) 60 Tab 1    traMADol (ULTRAM) 50 mg tablet Take 1 Tab by mouth every six (6) hours as needed for Pain. Max Daily Amount: 200 mg.  Do not take until after surgery (for acute post operative pain) (Patient not taking: Reported on 8/23/2022) 40 Tab 0    HYDROcodone-acetaminophen (NORCO) 5-325 mg per tablet Take 1 Tab by mouth every eight (8) hours as needed for Pain. (Patient not taking: Reported on 8/23/2022)      HYDROmorphone (DILAUDID) 2 mg tablet Take 1 Tab by mouth every four (4) hours as needed for Pain. Max Daily Amount: 12 mg. (Patient not taking: Reported on 8/23/2022) 40 Tab 0    acetaminophen (TYLENOL) 500 mg tablet Take  by mouth every six (6) hours as needed for Pain. (Patient not taking: Reported on 8/23/2022)      ibuprofen (MOTRIN) 400 mg tablet Take  by mouth every six (6) hours as needed for Pain. (Patient not taking: Reported on 8/23/2022)       Past Medical History:   Diagnosis Date    Bell's palsy     Cardiac nuclear imaging test, normal 07/13/2012    Low risk. No ischemia or prior infarction. No RWMA. EF 64%. Nondiagnostic EKG on EST. Ex time 10 min 41 sec. Lump     Palm of the left hand     Family History   Problem Relation Age of Onset    Diabetes Mother     Hypertension Mother     Osteoporosis Mother          ROS:  Some numbness whole foot    Objective:  Pulse 84   Resp 14   Ht 5' 5\" (1.651 m)   Wt 168 lb (76.2 kg)   SpO2 98%   BMI 27.96 kg/m²   NEURO:  Sensation intact light touch B/L lower extremities. Reflexes +2/4 patellar and Achilles bilaterally. MS:  Strength normal throughout upper and lower extremities bilateral .   right ankle/foot:  Positive tenderness at tib posterior>tib anterior/toe dorsiflxors>peroneus. Negative for tenderness at malleoli, base 5th, navicular. Anterior drawer test negative. Talar tilt negative. Kleiger test negative. Syndesmosis squeeze negative. negative fibular head tenderness. Fibular head motion normal. Gait normal.  ROM normal.    Assessment/Plan:     ICD-10-CM ICD-9-CM    1. Tendinitis of right ankle  M77.51 727.06 predniSONE (DELTASONE) 10 mg tablet          Patient (or guardian if minor) verbalizes understanding of evaluation and plan.     Will start HEP and Rx for prednisone taper as S/P gastric bypass and declines PT now  as above and plan follow-up 4 weeks.

## 2022-10-13 ENCOUNTER — TRANSCRIBE ORDER (OUTPATIENT)
Dept: SCHEDULING | Age: 62
End: 2022-10-13

## 2022-10-13 DIAGNOSIS — Z12.31 VISIT FOR SCREENING MAMMOGRAM: Primary | ICD-10-CM

## 2022-11-11 ENCOUNTER — HOSPITAL ENCOUNTER (OUTPATIENT)
Dept: MAMMOGRAPHY | Age: 62
Discharge: HOME | End: 2022-11-11
Attending: NURSE PRACTITIONER
Payer: COMMERCIAL

## 2022-11-11 DIAGNOSIS — Z12.31 VISIT FOR SCREENING MAMMOGRAM: ICD-10-CM

## 2022-11-11 PROCEDURE — 77063 BREAST TOMOSYNTHESIS BI: CPT

## 2023-10-18 ENCOUNTER — TRANSCRIBE ORDERS (OUTPATIENT)
Facility: HOSPITAL | Age: 63
End: 2023-10-18

## 2023-10-18 DIAGNOSIS — Z12.31 SCREENING MAMMOGRAM, ENCOUNTER FOR: Primary | ICD-10-CM

## 2023-11-13 ENCOUNTER — HOSPITAL ENCOUNTER (OUTPATIENT)
Facility: HOSPITAL | Age: 63
Discharge: HOME | End: 2023-11-16
Payer: MEDICARE

## 2023-11-13 VITALS — WEIGHT: 167.99 LBS | HEIGHT: 65 IN | BODY MASS INDEX: 27.99 KG/M2

## 2023-11-13 DIAGNOSIS — Z12.31 SCREENING MAMMOGRAM, ENCOUNTER FOR: ICD-10-CM

## 2023-11-13 PROCEDURE — 77067 SCR MAMMO BI INCL CAD: CPT

## 2023-11-21 ENCOUNTER — OFFICE VISIT (OUTPATIENT)
Age: 63
End: 2023-11-21
Payer: MEDICARE

## 2023-11-21 VITALS — BODY MASS INDEX: 25.43 KG/M2 | HEIGHT: 66 IN | WEIGHT: 158.2 LBS

## 2023-11-21 DIAGNOSIS — M25.511 ACUTE PAIN OF RIGHT SHOULDER: Primary | ICD-10-CM

## 2023-11-21 DIAGNOSIS — M75.51 SUBACROMIAL BURSITIS OF RIGHT SHOULDER JOINT: ICD-10-CM

## 2023-11-21 PROCEDURE — G8399 PT W/DXA RESULTS DOCUMENT: HCPCS | Performed by: ORTHOPAEDIC SURGERY

## 2023-11-21 PROCEDURE — G8427 DOCREV CUR MEDS BY ELIG CLIN: HCPCS | Performed by: ORTHOPAEDIC SURGERY

## 2023-11-21 PROCEDURE — G8484 FLU IMMUNIZE NO ADMIN: HCPCS | Performed by: ORTHOPAEDIC SURGERY

## 2023-11-21 PROCEDURE — G8419 CALC BMI OUT NRM PARAM NOF/U: HCPCS | Performed by: ORTHOPAEDIC SURGERY

## 2023-11-21 PROCEDURE — 20611 DRAIN/INJ JOINT/BURSA W/US: CPT | Performed by: ORTHOPAEDIC SURGERY

## 2023-11-21 PROCEDURE — 3017F COLORECTAL CA SCREEN DOC REV: CPT | Performed by: ORTHOPAEDIC SURGERY

## 2023-11-21 PROCEDURE — 73030 X-RAY EXAM OF SHOULDER: CPT | Performed by: ORTHOPAEDIC SURGERY

## 2023-11-21 PROCEDURE — 99214 OFFICE O/P EST MOD 30 MIN: CPT | Performed by: ORTHOPAEDIC SURGERY

## 2023-11-21 PROCEDURE — 1090F PRES/ABSN URINE INCON ASSESS: CPT | Performed by: ORTHOPAEDIC SURGERY

## 2023-11-21 PROCEDURE — 1036F TOBACCO NON-USER: CPT | Performed by: ORTHOPAEDIC SURGERY

## 2023-11-21 PROCEDURE — 1123F ACP DISCUSS/DSCN MKR DOCD: CPT | Performed by: ORTHOPAEDIC SURGERY

## 2023-11-21 RX ORDER — TRIAMCINOLONE ACETONIDE 40 MG/ML
40 INJECTION, SUSPENSION INTRA-ARTICULAR; INTRAMUSCULAR ONCE
Status: COMPLETED | OUTPATIENT
Start: 2023-11-21 | End: 2023-11-21

## 2023-11-21 RX ORDER — BACLOFEN 10 MG/1
10 TABLET ORAL 3 TIMES DAILY
Qty: 90 TABLET | Refills: 0 | Status: SHIPPED | OUTPATIENT
Start: 2023-11-21

## 2023-11-21 RX ADMIN — TRIAMCINOLONE ACETONIDE 40 MG: 40 INJECTION, SUSPENSION INTRA-ARTICULAR; INTRAMUSCULAR at 16:06

## 2023-11-22 ENCOUNTER — TELEPHONE (OUTPATIENT)
Age: 63
End: 2023-11-22

## 2023-11-22 NOTE — TELEPHONE ENCOUNTER
Patient called again and is asking that the Baclofen medication that  prescribed yesterday be faxed to     [facility] in [facility]. [phone]  Fax [phone]. That she is going out of town and would like to  the medication from that pharmacy. Patient would like a call once it is sent. Patient tel. [phone]. Note :  patient was seen yesterday 11/21/23 by  for the right Shoulder. Please see previous message.

## 2023-11-22 NOTE — TELEPHONE ENCOUNTER
Patient is going out of town and would like to know if she could have her prescription sent to [facility] in [address] [place] [address] Ave     Phone: [phone]  Fax[de-identified] [phone]

## 2023-11-27 RX ORDER — BACLOFEN 10 MG/1
10 TABLET ORAL 3 TIMES DAILY
Qty: 90 TABLET | Refills: 0 | Status: SHIPPED | OUTPATIENT
Start: 2023-11-27

## 2023-11-27 NOTE — TELEPHONE ENCOUNTER
Patient is back home now from the [facility] and is asking for prescriptions to be called in to [facility] on [address]. Per patient, there was to be 2 prescriptions for her shoulder pain. Please advise patient once done, [phone].

## 2023-11-27 NOTE — TELEPHONE ENCOUNTER
Baclofen is only med he wants her on at this time. This was sent to [facility] on 11/21.  It was resent today the other treatment was the injection she had

## 2023-12-01 ENCOUNTER — TELEPHONE (OUTPATIENT)
Age: 63
End: 2023-12-01

## 2023-12-01 DIAGNOSIS — M25.511 ACUTE PAIN OF RIGHT SHOULDER: Primary | ICD-10-CM

## 2023-12-01 DIAGNOSIS — M75.51 SUBACROMIAL BURSITIS OF RIGHT SHOULDER JOINT: ICD-10-CM

## 2023-12-01 NOTE — TELEPHONE ENCOUNTER
If she is not noticing any pain she should stop taking baclofen.  Order STAT MRI of shoulder r/o cuff tear

## 2023-12-01 NOTE — TELEPHONE ENCOUNTER
Tried to call patient. No answer, no machine. If patient calls back, please relay VANE Moser's message. Order entered for STAT MRI.

## 2023-12-09 ENCOUNTER — HOSPITAL ENCOUNTER (OUTPATIENT)
Facility: HOSPITAL | Age: 63
End: 2023-12-09
Attending: ORTHOPAEDIC SURGERY
Payer: MEDICARE

## 2023-12-09 DIAGNOSIS — M25.511 ACUTE PAIN OF RIGHT SHOULDER: ICD-10-CM

## 2023-12-09 DIAGNOSIS — M75.51 SUBACROMIAL BURSITIS OF RIGHT SHOULDER JOINT: ICD-10-CM

## 2023-12-09 PROCEDURE — 73221 MRI JOINT UPR EXTREM W/O DYE: CPT

## 2023-12-11 ENCOUNTER — OFFICE VISIT (OUTPATIENT)
Age: 63
End: 2023-12-11
Payer: MEDICARE

## 2023-12-11 VITALS — HEIGHT: 66 IN | WEIGHT: 150 LBS | BODY MASS INDEX: 24.11 KG/M2

## 2023-12-11 DIAGNOSIS — M25.511 ACUTE PAIN OF RIGHT SHOULDER: Primary | ICD-10-CM

## 2023-12-11 DIAGNOSIS — M54.12 CERVICAL RADICULOPATHY: ICD-10-CM

## 2023-12-11 DIAGNOSIS — M75.51 SUBACROMIAL BURSITIS OF RIGHT SHOULDER JOINT: ICD-10-CM

## 2023-12-11 PROCEDURE — 1090F PRES/ABSN URINE INCON ASSESS: CPT | Performed by: ORTHOPAEDIC SURGERY

## 2023-12-11 PROCEDURE — G8399 PT W/DXA RESULTS DOCUMENT: HCPCS | Performed by: ORTHOPAEDIC SURGERY

## 2023-12-11 PROCEDURE — 1123F ACP DISCUSS/DSCN MKR DOCD: CPT | Performed by: ORTHOPAEDIC SURGERY

## 2023-12-11 PROCEDURE — 3017F COLORECTAL CA SCREEN DOC REV: CPT | Performed by: ORTHOPAEDIC SURGERY

## 2023-12-11 PROCEDURE — G8427 DOCREV CUR MEDS BY ELIG CLIN: HCPCS | Performed by: ORTHOPAEDIC SURGERY

## 2023-12-11 PROCEDURE — 99213 OFFICE O/P EST LOW 20 MIN: CPT | Performed by: ORTHOPAEDIC SURGERY

## 2023-12-11 PROCEDURE — G8484 FLU IMMUNIZE NO ADMIN: HCPCS | Performed by: ORTHOPAEDIC SURGERY

## 2023-12-11 PROCEDURE — G8420 CALC BMI NORM PARAMETERS: HCPCS | Performed by: ORTHOPAEDIC SURGERY

## 2023-12-11 PROCEDURE — 1036F TOBACCO NON-USER: CPT | Performed by: ORTHOPAEDIC SURGERY

## 2023-12-11 RX ORDER — TOPIRAMATE 25 MG/1
25 TABLET ORAL NIGHTLY
Qty: 30 TABLET | Refills: 0 | Status: SHIPPED | OUTPATIENT
Start: 2023-12-11

## 2024-01-03 ENCOUNTER — HOSPITAL ENCOUNTER (OUTPATIENT)
Facility: HOSPITAL | Age: 64
Discharge: HOME | End: 2024-01-06
Payer: MEDICARE

## 2024-01-03 DIAGNOSIS — M85.89 OTHER SPECIFIED DISORDERS OF BONE DENSITY AND STRUCTURE, MULTIPLE SITES: ICD-10-CM

## 2024-01-03 DIAGNOSIS — Z13.6 SCREENING FOR AAA (ABDOMINAL AORTIC ANEURYSM): ICD-10-CM

## 2024-01-03 PROCEDURE — 77080 DXA BONE DENSITY AXIAL: CPT

## 2024-01-03 PROCEDURE — 76706 US ABDL AORTA SCREEN AAA: CPT

## 2024-03-18 PROBLEM — M81.0 OSTEOPOROSIS, POST-MENOPAUSAL: Status: ACTIVE | Noted: 2024-03-18

## 2024-03-18 RX ORDER — DIPHENHYDRAMINE HYDROCHLORIDE 50 MG/ML
50 INJECTION INTRAMUSCULAR; INTRAVENOUS
Status: CANCELLED | OUTPATIENT
Start: 2024-04-23

## 2024-03-18 RX ORDER — ONDANSETRON 2 MG/ML
8 INJECTION INTRAMUSCULAR; INTRAVENOUS
Status: CANCELLED | OUTPATIENT
Start: 2024-04-23

## 2024-03-18 RX ORDER — ALBUTEROL SULFATE 90 UG/1
4 AEROSOL, METERED RESPIRATORY (INHALATION) PRN
Status: CANCELLED | OUTPATIENT
Start: 2024-04-23

## 2024-03-18 RX ORDER — ACETAMINOPHEN 325 MG/1
650 TABLET ORAL
Status: CANCELLED | OUTPATIENT
Start: 2024-04-23

## 2024-03-18 RX ORDER — SODIUM CHLORIDE 9 MG/ML
INJECTION, SOLUTION INTRAVENOUS CONTINUOUS
Status: CANCELLED | OUTPATIENT
Start: 2024-04-23

## 2024-03-18 RX ORDER — EPINEPHRINE 1 MG/ML
0.3 INJECTION, SOLUTION, CONCENTRATE INTRAVENOUS PRN
Status: CANCELLED | OUTPATIENT
Start: 2024-04-23

## 2024-03-21 ENCOUNTER — HOSPITAL ENCOUNTER (OUTPATIENT)
Facility: HOSPITAL | Age: 64
Setting detail: INFUSION SERIES
End: 2024-03-21
Payer: MEDICARE

## 2024-03-21 VITALS
OXYGEN SATURATION: 99 % | SYSTOLIC BLOOD PRESSURE: 131 MMHG | TEMPERATURE: 97.4 F | HEART RATE: 73 BPM | RESPIRATION RATE: 14 BRPM | DIASTOLIC BLOOD PRESSURE: 76 MMHG

## 2024-03-21 LAB
ALBUMIN SERPL-MCNC: 4 G/DL (ref 3.4–5)
ANION GAP SERPL CALC-SCNC: 7 MMOL/L (ref 3–18)
BUN SERPL-MCNC: 14 MG/DL (ref 7–18)
BUN/CREAT SERPL: 14 (ref 12–20)
CALCIUM SERPL-MCNC: 8.9 MG/DL (ref 8.5–10.1)
CHLORIDE SERPL-SCNC: 109 MMOL/L (ref 100–111)
CO2 SERPL-SCNC: 22 MMOL/L (ref 21–32)
CREAT SERPL-MCNC: 0.99 MG/DL (ref 0.6–1.3)
GLUCOSE SERPL-MCNC: 103 MG/DL (ref 74–99)
MAGNESIUM SERPL-MCNC: 2.3 MG/DL (ref 1.6–2.6)
PHOSPHATE SERPL-MCNC: 5.1 MG/DL (ref 2.5–4.9)
POTASSIUM SERPL-SCNC: 4.8 MMOL/L (ref 3.5–5.5)
SODIUM SERPL-SCNC: 138 MMOL/L (ref 136–145)

## 2024-03-21 PROCEDURE — 36415 COLL VENOUS BLD VENIPUNCTURE: CPT

## 2024-03-21 PROCEDURE — 80069 RENAL FUNCTION PANEL: CPT

## 2024-03-21 PROCEDURE — 83735 ASSAY OF MAGNESIUM: CPT

## 2024-03-21 NOTE — PROGRESS NOTES
[facility] Lab Visit:    Olimpia Kendall  1960  [id number]    0940: Patient arrived ambulatory without assistance. Labs drawn per order via right hand venipuncture. Patient tolerated well without complaints. Gauze and band-aid to site. Patient departed [facility] ambulatory and in no distress at 0952. Patient is scheduled to return for her next appointment on Monday, March 25, 2024 at 1000.    Vitals:    03/21/24 0940   BP: 131/76   Pulse: 73   Resp: 14   Temp: 97.4 °F (36.3 °C)   SpO2: 99%

## 2024-04-22 ENCOUNTER — HOSPITAL ENCOUNTER (OUTPATIENT)
Facility: HOSPITAL | Age: 64
Setting detail: INFUSION SERIES
Discharge: HOME | End: 2024-04-25
Payer: MEDICARE

## 2024-04-22 VITALS
WEIGHT: 162.5 LBS | SYSTOLIC BLOOD PRESSURE: 129 MMHG | DIASTOLIC BLOOD PRESSURE: 78 MMHG | TEMPERATURE: 98.3 F | OXYGEN SATURATION: 99 % | HEART RATE: 77 BPM | RESPIRATION RATE: 16 BRPM | BODY MASS INDEX: 26.23 KG/M2

## 2024-04-22 LAB
ALBUMIN SERPL-MCNC: 3.7 G/DL (ref 3.4–5)
ANION GAP SERPL CALC-SCNC: 6 MMOL/L (ref 3–18)
BUN SERPL-MCNC: 14 MG/DL (ref 7–18)
BUN/CREAT SERPL: 18 (ref 12–20)
CALCIUM SERPL-MCNC: 8.9 MG/DL (ref 8.5–10.1)
CHLORIDE SERPL-SCNC: 108 MMOL/L (ref 100–111)
CO2 SERPL-SCNC: 25 MMOL/L (ref 21–32)
CREAT SERPL-MCNC: 0.78 MG/DL (ref 0.6–1.3)
GLUCOSE SERPL-MCNC: 85 MG/DL (ref 74–99)
MAGNESIUM SERPL-MCNC: 2.2 MG/DL (ref 1.6–2.6)
PHOSPHATE SERPL-MCNC: 4 MG/DL (ref 2.5–4.9)
POTASSIUM SERPL-SCNC: 4.6 MMOL/L (ref 3.5–5.5)
SODIUM SERPL-SCNC: 139 MMOL/L (ref 136–145)

## 2024-04-22 PROCEDURE — 80069 RENAL FUNCTION PANEL: CPT

## 2024-04-22 PROCEDURE — 83735 ASSAY OF MAGNESIUM: CPT

## 2024-04-22 PROCEDURE — 36415 COLL VENOUS BLD VENIPUNCTURE: CPT

## 2024-04-22 NOTE — PROGRESS NOTES
[facility] Lab Visit:    Olimpia Kendall  1960  [id number]    0840: Pt arrived ambulatory w/o assist. Labs drawn per Prolia order via right AC venipuncture x1 attempt. Pt tolerated well without complaints. Gauze/ coban to site. Pt armband removed/ shredded. Pt departed [facility] ambulatory and in no distress at 0850. Pt is scheduled to return for her next appointment on 04/23/24 at 0900.    Vitals:    04/22/24 0840   BP: 129/78   Pulse: 77   Resp: 16   Temp: 98.3 °F (36.8 °C)   SpO2: 99%     Pt was given Prolia & fall prevention educational handouts prior to discharge.

## 2024-04-23 ENCOUNTER — HOSPITAL ENCOUNTER (OUTPATIENT)
Facility: HOSPITAL | Age: 64
Setting detail: INFUSION SERIES
Discharge: HOME | End: 2024-04-26
Payer: MEDICARE

## 2024-04-23 VITALS
OXYGEN SATURATION: 97 % | DIASTOLIC BLOOD PRESSURE: 77 MMHG | SYSTOLIC BLOOD PRESSURE: 121 MMHG | HEART RATE: 68 BPM | TEMPERATURE: 97.4 F | RESPIRATION RATE: 16 BRPM

## 2024-04-23 DIAGNOSIS — M81.0 OSTEOPOROSIS, POST-MENOPAUSAL: Primary | ICD-10-CM

## 2024-04-23 PROCEDURE — 96372 THER/PROPH/DIAG INJ SC/IM: CPT

## 2024-04-23 PROCEDURE — 6360000002 HC RX W HCPCS: Performed by: NURSE PRACTITIONER

## 2024-04-23 RX ORDER — SODIUM CHLORIDE 9 MG/ML
INJECTION, SOLUTION INTRAVENOUS CONTINUOUS
OUTPATIENT
Start: 2024-10-20

## 2024-04-23 RX ORDER — MULTIVIT-MIN/IRON/FOLIC ACID/K 18-600-40
CAPSULE ORAL DAILY
COMMUNITY

## 2024-04-23 RX ORDER — EPINEPHRINE 1 MG/ML
0.3 INJECTION, SOLUTION, CONCENTRATE INTRAVENOUS PRN
OUTPATIENT
Start: 2024-10-20

## 2024-04-23 RX ORDER — ALBUTEROL SULFATE 90 UG/1
4 AEROSOL, METERED RESPIRATORY (INHALATION) PRN
OUTPATIENT
Start: 2024-10-20

## 2024-04-23 RX ORDER — ONDANSETRON 2 MG/ML
8 INJECTION INTRAMUSCULAR; INTRAVENOUS
OUTPATIENT
Start: 2024-10-20

## 2024-04-23 RX ORDER — ACETAMINOPHEN 325 MG/1
650 TABLET ORAL
OUTPATIENT
Start: 2024-10-20

## 2024-04-23 RX ORDER — DIPHENHYDRAMINE HYDROCHLORIDE 50 MG/ML
50 INJECTION INTRAMUSCULAR; INTRAVENOUS
OUTPATIENT
Start: 2024-10-20

## 2024-04-23 RX ADMIN — DENOSUMAB 60 MG: 60 INJECTION SUBCUTANEOUS at 09:35

## 2024-04-23 ASSESSMENT — PAIN DESCRIPTION - DESCRIPTORS: DESCRIPTORS: TINGLING

## 2024-04-23 ASSESSMENT — PAIN - FUNCTIONAL ASSESSMENT: PAIN_FUNCTIONAL_ASSESSMENT: 0-10

## 2024-04-23 NOTE — PROGRESS NOTES
[facility] Progress Note    Date: 2024    Name: Olimpia Kendall    MRN: [id number]         : 1960    Ms. Kendall arrived in the [facility] today at 0900, in stable condition, here for her PROLIA INJECTION (EVERY 6 MONTHS). She was assessed and education was provided.     INITIAL PROLIA INJECTION TODAY.         Ms. Kendall's vitals were reviewed.  Vitals:    24 0900   BP: 125/80   Pulse: 77   Resp: 16   Temp: 98.1 °F (36.7 °C)   SpO2: 97%             Her Pre-Prolia labs obtained on 24 were reviewed, and were all noted to be satisfactory for treatment today, and were as follows:     Latest Reference Range & Units 24 08:42   Sodium 136 - 145 mmol/L 139   Potassium 3.5 - 5.5 mmol/L 4.6   Chloride 100 - 111 mmol/L 108   CARBON DIOXIDE 21 - 32 mmol/L 25   BUN,BUNPL 7.0 - 18 MG/DL 14   Creatinine 0.6 - 1.3 MG/DL 0.78   Bun/Cre Ratio 12 - 20   18   Anion Gap 3.0 - 18 mmol/L 6   Est, Glom Filt Rate >60 ml/min/1.73m2 84   Magnesium 1.6 - 2.6 mg/dL 2.2   Glucose, Random 74 - 99 mg/dL 85   Calcium, Total 8.5 - 10.1 MG/DL 8.9   Phosphorus 2.5 - 4.9 MG/DL 4.0   Albumin 3.4 - 5.0 g/dL 3.7         Both verbal and written patient education on the PROLIA injection and associated potential side effects and adverse reactions was provided, and Ms. Kendall verbalized understanding.         Denosumab (PROLIA) 60 mg, was administered SQ, in the back of her LEFT ARM at 0935 per order and without incident.       And after the administration of the PROLIA injection, Ms. Kendall was kept for approximately 30 minutes of observation just as a precaution, since today was her first dose of the medication.             Ms. Kendall tolerated treatment very well today, and she voiced no complaints.     [facility] condition at 1010.     She is to return in 6 months, on Tuesday, 10-22-24 at 0830, for her next appointment, to have her pre-PROLIA labs drawn.     And then, she

## 2024-09-19 ENCOUNTER — APPOINTMENT (OUTPATIENT)
Facility: HOSPITAL | Age: 64
End: 2024-09-19
Payer: MEDICARE

## 2024-09-23 ENCOUNTER — APPOINTMENT (OUTPATIENT)
Facility: HOSPITAL | Age: 64
End: 2024-09-23
Payer: MEDICARE

## 2024-10-22 ENCOUNTER — HOSPITAL ENCOUNTER (OUTPATIENT)
Facility: HOSPITAL | Age: 64
Setting detail: INFUSION SERIES
Discharge: HOME | End: 2024-10-25
Payer: MEDICARE

## 2024-10-22 VITALS
TEMPERATURE: 97.5 F | OXYGEN SATURATION: 99 % | DIASTOLIC BLOOD PRESSURE: 77 MMHG | RESPIRATION RATE: 16 BRPM | HEART RATE: 80 BPM | SYSTOLIC BLOOD PRESSURE: 142 MMHG

## 2024-10-22 LAB
ALBUMIN SERPL-MCNC: 4.2 G/DL (ref 3.4–5)
ANION GAP SERPL CALC-SCNC: 7 MMOL/L (ref 3–18)
BUN SERPL-MCNC: 16 MG/DL (ref 7–18)
BUN/CREAT SERPL: 24 (ref 12–20)
CALCIUM SERPL-MCNC: 9 MG/DL (ref 8.5–10.1)
CHLORIDE SERPL-SCNC: 105 MMOL/L (ref 100–111)
CO2 SERPL-SCNC: 24 MMOL/L (ref 21–32)
CREAT SERPL-MCNC: 0.67 MG/DL (ref 0.6–1.3)
GLUCOSE SERPL-MCNC: 87 MG/DL (ref 74–99)
MAGNESIUM SERPL-MCNC: 2.2 MG/DL (ref 1.6–2.6)
PHOSPHATE SERPL-MCNC: 3.1 MG/DL (ref 2.5–4.9)
POTASSIUM SERPL-SCNC: 4.1 MMOL/L (ref 3.5–5.5)
SODIUM SERPL-SCNC: 136 MMOL/L (ref 136–145)

## 2024-10-22 PROCEDURE — 83735 ASSAY OF MAGNESIUM: CPT

## 2024-10-22 PROCEDURE — 36415 COLL VENOUS BLD VENIPUNCTURE: CPT

## 2024-10-22 PROCEDURE — 80069 RENAL FUNCTION PANEL: CPT

## 2024-10-22 NOTE — PROGRESS NOTES
[facility] Lab Visit:    Olimpia Kendall  1960  [id number]    0840: Pt arrived ambulatory w/o assist. Labs drawn per Prolia order via R hand. Pt tolerated well without complaints. Gauze/ coban to site. Pt armband removed/ shredded. Pt departed [facility] ambulatory and in no distress at 0905. Pt is scheduled to return for her next appointment on 10/23/24 at 0900.    Vitals:    10/22/24 0842   BP: (!) 142/77   Pulse: 80   Resp: 16   Temp: 97.5 °F (36.4 °C)   SpO2: 99%

## 2024-10-23 ENCOUNTER — HOSPITAL ENCOUNTER (OUTPATIENT)
Facility: HOSPITAL | Age: 64
Setting detail: INFUSION SERIES
Discharge: HOME | End: 2024-10-26
Payer: MEDICARE

## 2024-10-23 VITALS
TEMPERATURE: 97.8 F | OXYGEN SATURATION: 97 % | DIASTOLIC BLOOD PRESSURE: 93 MMHG | SYSTOLIC BLOOD PRESSURE: 134 MMHG | HEART RATE: 81 BPM | RESPIRATION RATE: 16 BRPM

## 2024-10-23 DIAGNOSIS — M81.0 OSTEOPOROSIS, POST-MENOPAUSAL: Primary | ICD-10-CM

## 2024-10-23 PROCEDURE — 96372 THER/PROPH/DIAG INJ SC/IM: CPT

## 2024-10-23 PROCEDURE — 6360000002 HC RX W HCPCS: Performed by: NURSE PRACTITIONER

## 2024-10-23 RX ORDER — EPINEPHRINE 1 MG/ML
0.3 INJECTION, SOLUTION, CONCENTRATE INTRAVENOUS PRN
OUTPATIENT
Start: 2025-04-20

## 2024-10-23 RX ORDER — SODIUM CHLORIDE 9 MG/ML
INJECTION, SOLUTION INTRAVENOUS CONTINUOUS
OUTPATIENT
Start: 2025-04-20

## 2024-10-23 RX ORDER — ALBUTEROL SULFATE 90 UG/1
4 INHALANT RESPIRATORY (INHALATION) PRN
OUTPATIENT
Start: 2025-04-20

## 2024-10-23 RX ORDER — ACETAMINOPHEN 325 MG/1
650 TABLET ORAL
OUTPATIENT
Start: 2025-04-20

## 2024-10-23 RX ORDER — DIPHENHYDRAMINE HYDROCHLORIDE 50 MG/ML
50 INJECTION INTRAMUSCULAR; INTRAVENOUS
OUTPATIENT
Start: 2025-04-20

## 2024-10-23 RX ORDER — ONDANSETRON 2 MG/ML
8 INJECTION INTRAMUSCULAR; INTRAVENOUS
OUTPATIENT
Start: 2025-04-20

## 2024-10-23 RX ADMIN — DENOSUMAB 60 MG: 60 INJECTION SUBCUTANEOUS at 09:09

## 2024-10-23 ASSESSMENT — PAIN DESCRIPTION - LOCATION: LOCATION: SHOULDER

## 2024-10-23 ASSESSMENT — PAIN SCALES - GENERAL: PAINLEVEL_OUTOF10: 4

## 2024-10-23 NOTE — PROGRESS NOTES
\[facility]\"" Progress Note    Date: 2024    Name: Olimpia Kendall    MRN: [id number]         : 1960    Ms. Kendall arrived in the \[facility]\"" today at 0905, in stable condition, here for her PROLIA INJECTION (EVERY 6 MONTHS). She was assessed and education was provided.       Ms. Kendall's vitals were reviewed.  Vitals:    10/23/24 0906   BP: (!) 134/93   Pulse: 81   Resp: 16   Temp: 97.8 °F (36.6 °C)   SpO2: 97%     Her Pre-Prolia labs obtained on 10/22/24 were reviewed, and were all noted to be satisfactory for treatment today, and were as follows:    Calcium = 9.0  Magnesium = 2.2  Phosphorus = 3.1    Denosumab (PROLIA) 60 mg was administered SQ in the back of her LEFT ARM per order and without incident.     Ms. Kendall tolerated treatment very well today, and she voiced no complaints.     [facility]. Kendall [facility] in [facility] condition at 0910.    She is to return on 25 at 0830 for her next appointment to have her pre-PROLIA labs drawn.       Tania Godfrey RN  2024  9:16 AM

## 2024-11-19 ENCOUNTER — HOSPITAL ENCOUNTER (OUTPATIENT)
Facility: HOSPITAL | Age: 64
Discharge: HOME | End: 2024-11-22
Payer: MEDICARE

## 2024-11-19 ENCOUNTER — TRANSCRIBE ORDERS (OUTPATIENT)
Facility: HOSPITAL | Age: 64
End: 2024-11-19

## 2024-11-19 VITALS — HEIGHT: 66 IN | BODY MASS INDEX: 27.32 KG/M2 | WEIGHT: 170 LBS

## 2024-11-19 DIAGNOSIS — Z12.31 OTHER SCREENING MAMMOGRAM: ICD-10-CM

## 2024-11-19 DIAGNOSIS — Z12.31 OTHER SCREENING MAMMOGRAM: Primary | ICD-10-CM

## 2024-11-19 PROCEDURE — 77063 BREAST TOMOSYNTHESIS BI: CPT

## 2025-01-19 ENCOUNTER — HOSPITAL ENCOUNTER (INPATIENT)
Age: 65
LOS: 3 days | Discharge: HOME HEALTH | DRG: 517 | End: 2025-01-22
Attending: INTERNAL MEDICINE | Admitting: INTERNAL MEDICINE
Payer: MEDICARE

## 2025-01-19 DIAGNOSIS — S82.091A CLOSED PATELLAR SLEEVE FRACTURE OF RIGHT KNEE, INITIAL ENCOUNTER: Primary | ICD-10-CM

## 2025-01-19 LAB
EKG ATRIAL RATE: 70 BPM
EKG DIAGNOSIS: NORMAL
EKG P AXIS: 58 DEGREES
EKG P-R INTERVAL: 142 MS
EKG Q-T INTERVAL: 372 MS
EKG QRS DURATION: 76 MS
EKG QTC CALCULATION (BAZETT): 401 MS
EKG R AXIS: 4 DEGREES
EKG T AXIS: 21 DEGREES
EKG VENTRICULAR RATE: 70 BPM

## 2025-01-19 PROCEDURE — 93005 ELECTROCARDIOGRAM TRACING: CPT | Performed by: ORTHOPAEDIC SURGERY

## 2025-01-19 PROCEDURE — 6370000000 HC RX 637 (ALT 250 FOR IP): Performed by: NURSE PRACTITIONER

## 2025-01-19 PROCEDURE — 1100000000 HC RM PRIVATE

## 2025-01-19 PROCEDURE — 6360000002 HC RX W HCPCS: Performed by: NURSE PRACTITIONER

## 2025-01-19 RX ORDER — OXYCODONE AND ACETAMINOPHEN 5; 325 MG/1; MG/1
1 TABLET ORAL EVERY 4 HOURS PRN
Status: DISCONTINUED | OUTPATIENT
Start: 2025-01-19 | End: 2025-01-20 | Stop reason: SDUPTHER

## 2025-01-19 RX ORDER — VITAMIN B COMPLEX
2000 TABLET ORAL DAILY
Status: DISCONTINUED | OUTPATIENT
Start: 2025-01-20 | End: 2025-01-22 | Stop reason: HOSPADM

## 2025-01-19 RX ORDER — MORPHINE SULFATE 4 MG/ML
4 INJECTION, SOLUTION INTRAMUSCULAR; INTRAVENOUS EVERY 4 HOURS PRN
Status: DISCONTINUED | OUTPATIENT
Start: 2025-01-19 | End: 2025-01-22 | Stop reason: HOSPADM

## 2025-01-19 RX ORDER — ENOXAPARIN SODIUM 100 MG/ML
40 INJECTION SUBCUTANEOUS DAILY
Status: DISCONTINUED | OUTPATIENT
Start: 2025-01-19 | End: 2025-01-22 | Stop reason: HOSPADM

## 2025-01-19 RX ORDER — ONDANSETRON 4 MG/1
4 TABLET, ORALLY DISINTEGRATING ORAL EVERY 8 HOURS PRN
Status: DISCONTINUED | OUTPATIENT
Start: 2025-01-19 | End: 2025-01-21 | Stop reason: SDUPTHER

## 2025-01-19 RX ORDER — ATORVASTATIN CALCIUM 10 MG/1
10 TABLET, FILM COATED ORAL DAILY
Status: DISCONTINUED | OUTPATIENT
Start: 2025-01-19 | End: 2025-01-22 | Stop reason: HOSPADM

## 2025-01-19 RX ORDER — LORAZEPAM 0.5 MG/1
0.5 TABLET ORAL EVERY 6 HOURS PRN
COMMUNITY

## 2025-01-19 RX ORDER — ZOLPIDEM TARTRATE 5 MG/1
5 TABLET ORAL NIGHTLY PRN
Status: DISCONTINUED | OUTPATIENT
Start: 2025-01-19 | End: 2025-01-22 | Stop reason: HOSPADM

## 2025-01-19 RX ORDER — MULTIVITAMIN WITH IRON
1000 TABLET ORAL DAILY
Status: DISCONTINUED | OUTPATIENT
Start: 2025-01-19 | End: 2025-01-22 | Stop reason: HOSPADM

## 2025-01-19 RX ORDER — ENOXAPARIN SODIUM 100 MG/ML
40 INJECTION SUBCUTANEOUS DAILY
Status: DISCONTINUED | OUTPATIENT
Start: 2025-01-21 | End: 2025-01-19

## 2025-01-19 RX ADMIN — ZOLPIDEM TARTRATE 5 MG: 5 TABLET ORAL at 20:51

## 2025-01-19 RX ADMIN — MORPHINE SULFATE 4 MG: 4 INJECTION, SOLUTION INTRAMUSCULAR; INTRAVENOUS at 20:50

## 2025-01-19 RX ADMIN — MORPHINE SULFATE 4 MG: 4 INJECTION, SOLUTION INTRAMUSCULAR; INTRAVENOUS at 13:23

## 2025-01-19 ASSESSMENT — PAIN SCALES - GENERAL
PAINLEVEL_OUTOF10: 7
PAINLEVEL_OUTOF10: 8
PAINLEVEL_OUTOF10: 10
PAINLEVEL_OUTOF10: 5
PAINLEVEL_OUTOF10: 3

## 2025-01-19 ASSESSMENT — PAIN DESCRIPTION - DESCRIPTORS
DESCRIPTORS: THROBBING;ACHING
DESCRIPTORS: DISCOMFORT

## 2025-01-19 ASSESSMENT — PAIN DESCRIPTION - LOCATION
LOCATION: KNEE
LOCATION: KNEE

## 2025-01-19 ASSESSMENT — PAIN DESCRIPTION - ORIENTATION
ORIENTATION: RIGHT
ORIENTATION: RIGHT

## 2025-01-19 NOTE — PROGRESS NOTES
1230- patient admitted to the floor, VS, assessment completed. [place], drink, warm blanket, and extra pillows provided. Waiting on pain medication. Oeby NP in patients room.    1320- IV placed, morphine given for pain, patient left with no needs

## 2025-01-19 NOTE — PLAN OF CARE
Problem: Discharge Planning  Goal: Discharge to home or other facility with appropriate resources  Outcome: Progressing  Flowsheets (Taken 1/19/2025 1230)  Discharge to home or other facility with appropriate resources:   Identify barriers to discharge with patient and caregiver   Arrange for needed discharge resources and transportation as appropriate   Identify discharge learning needs (meds, wound care, etc)     Problem: Skin/Tissue Integrity  Goal: Absence of new skin breakdown  Description: 1.  Monitor for areas of redness and/or skin breakdown  2.  Assess vascular access sites hourly  3.  Every 4-6 hours minimum:  Change oxygen saturation probe site  4.  Every 4-6 hours:  If on nasal continuous positive airway pressure, respiratory therapy assess nares and determine need for appliance change or resting period.  Outcome: Progressing     Problem: Safety - Adult  Goal: Free from fall injury  Outcome: Progressing  Flowsheets (Taken 1/19/2025 1244)  Free From Fall Injury: Instruct family/caregiver on patient safety     Problem: ABCDS Injury Assessment  Goal: Absence of physical injury  Outcome: Progressing  Flowsheets (Taken 1/19/2025 1244)  Absence of Physical Injury: Implement safety measures based on patient assessment

## 2025-01-19 NOTE — H&P
History and Physical    Subjective:     Olimpia Kendall is a 64 y.o. female with a PMHx significant for HLP, depression and osteoporosis who was sent over to the medical unit as a direct admit from Dr. Orellana (Ortho) office to be admitted for surgery in AM.  Patient reports she was out in the [facility] area 2 days ago, she was climbing a paved surface, she miss stepped, and fell forward, supporting the fall with both hands. She felt her right knee crunching, and experienced severe pain to the right knee, and unable to bear weight in the leg. She visited the nearest ER where a 3 view x-ray of the knee was performed which showed \"there is a displaced fracture of the mid aspect of the patella with superior and inferior displacement of the fracture fragments\". Anterior soft tissue swelling\".  Patient denies any chest pains, shortness of breath, fever or chills.  She denies any cardiac history.  Denies tobacco use.  States she has been in her usual healthy state.    We agreed to inpatient admission criteria for acute right patella fracture.  Estimated LOS: 2-3 midnight.    Past Medical History:   Diagnosis Date    Abnormal nuclear cardiac imaging test 07/13/2012    Low risk.  No ischemia or prior infarction.  No RWMA.  EF 64%.  Nondiagnostic EKG on EST.  Ex time 10 min 41 sec.    Bell's palsy     Lump     Palm of the left hand    Osteoporosis       Past Surgical History:   Procedure Laterality Date    APPENDECTOMY      CHOLECYSTECTOMY      GASTRIC BYPASS SURGERY      HAND/FINGER SURGERY UNLISTED Left     cyst removal    HYSTERECTOMY (CERVIX STATUS UNKNOWN)  2008     Family History   Problem Relation Age of Onset    Diabetes Mother     Hypertension Mother     Osteoporosis Mother     Brain Cancer Father       Social History     Tobacco Use    Smoking status: Never    Smokeless tobacco: Never   Substance Use Topics    Alcohol use: No     Alcohol/week: 0.0 standard drinks of alcohol       Prior to Admission

## 2025-01-20 ENCOUNTER — APPOINTMENT (OUTPATIENT)
Age: 65
DRG: 517 | End: 2025-01-20
Attending: INTERNAL MEDICINE
Payer: MEDICARE

## 2025-01-20 ENCOUNTER — ANESTHESIA (OUTPATIENT)
Age: 65
DRG: 517 | End: 2025-01-20
Payer: MEDICARE

## 2025-01-20 ENCOUNTER — ANESTHESIA EVENT (OUTPATIENT)
Age: 65
DRG: 517 | End: 2025-01-20
Payer: MEDICARE

## 2025-01-20 PROCEDURE — 6360000002 HC RX W HCPCS

## 2025-01-20 PROCEDURE — 73560 X-RAY EXAM OF KNEE 1 OR 2: CPT

## 2025-01-20 PROCEDURE — 64447 NJX AA&/STRD FEMORAL NRV IMG: CPT

## 2025-01-20 PROCEDURE — 3600000015 HC SURGERY LEVEL 5 ADDTL 15MIN: Performed by: ORTHOPAEDIC SURGERY

## 2025-01-20 PROCEDURE — 7100000000 HC PACU RECOVERY - FIRST 15 MIN: Performed by: ORTHOPAEDIC SURGERY

## 2025-01-20 PROCEDURE — 2709999900 HC NON-CHARGEABLE SUPPLY: Performed by: ORTHOPAEDIC SURGERY

## 2025-01-20 PROCEDURE — 2500000003 HC RX 250 WO HCPCS

## 2025-01-20 PROCEDURE — 3700000001 HC ADD 15 MINUTES (ANESTHESIA): Performed by: ORTHOPAEDIC SURGERY

## 2025-01-20 PROCEDURE — 3600000005 HC SURGERY LEVEL 5 BASE: Performed by: ORTHOPAEDIC SURGERY

## 2025-01-20 PROCEDURE — 7100000001 HC PACU RECOVERY - ADDTL 15 MIN: Performed by: ORTHOPAEDIC SURGERY

## 2025-01-20 PROCEDURE — 6360000002 HC RX W HCPCS: Performed by: NURSE PRACTITIONER

## 2025-01-20 PROCEDURE — 2580000003 HC RX 258

## 2025-01-20 PROCEDURE — 2580000003 HC RX 258: Performed by: ORTHOPAEDIC SURGERY

## 2025-01-20 PROCEDURE — 94761 N-INVAS EAR/PLS OXIMETRY MLT: CPT

## 2025-01-20 PROCEDURE — 6360000002 HC RX W HCPCS: Performed by: ORTHOPAEDIC SURGERY

## 2025-01-20 PROCEDURE — 6370000000 HC RX 637 (ALT 250 FOR IP): Performed by: NURSE PRACTITIONER

## 2025-01-20 PROCEDURE — 3E0T3BZ INTRODUCTION OF ANESTHETIC AGENT INTO PERIPHERAL NERVES AND PLEXI, PERCUTANEOUS APPROACH: ICD-10-PCS | Performed by: INTERNAL MEDICINE

## 2025-01-20 PROCEDURE — 3700000000 HC ANESTHESIA ATTENDED CARE: Performed by: ORTHOPAEDIC SURGERY

## 2025-01-20 PROCEDURE — 6370000000 HC RX 637 (ALT 250 FOR IP)

## 2025-01-20 PROCEDURE — 1100000000 HC RM PRIVATE

## 2025-01-20 PROCEDURE — C1713 ANCHOR/SCREW BN/BN,TIS/BN: HCPCS | Performed by: ORTHOPAEDIC SURGERY

## 2025-01-20 PROCEDURE — 6360000002 HC RX W HCPCS: Performed by: NURSE ANESTHETIST, CERTIFIED REGISTERED

## 2025-01-20 PROCEDURE — 2700000000 HC OXYGEN THERAPY PER DAY

## 2025-01-20 PROCEDURE — 2500000003 HC RX 250 WO HCPCS: Performed by: ORTHOPAEDIC SURGERY

## 2025-01-20 PROCEDURE — 0QSD04Z REPOSITION RIGHT PATELLA WITH INTERNAL FIXATION DEVICE, OPEN APPROACH: ICD-10-PCS | Performed by: ORTHOPAEDIC SURGERY

## 2025-01-20 DEVICE — IMPLANTABLE DEVICE: Type: IMPLANTABLE DEVICE | Site: PATELLA | Status: FUNCTIONAL

## 2025-01-20 RX ORDER — ONDANSETRON 4 MG/1
8 TABLET, ORALLY DISINTEGRATING ORAL EVERY 8 HOURS PRN
Status: DISCONTINUED | OUTPATIENT
Start: 2025-01-20 | End: 2025-01-22 | Stop reason: HOSPADM

## 2025-01-20 RX ORDER — SODIUM CHLORIDE, SODIUM LACTATE, POTASSIUM CHLORIDE, CALCIUM CHLORIDE 600; 310; 30; 20 MG/100ML; MG/100ML; MG/100ML; MG/100ML
INJECTION, SOLUTION INTRAVENOUS CONTINUOUS
Status: DISCONTINUED | OUTPATIENT
Start: 2025-01-20 | End: 2025-01-20 | Stop reason: HOSPADM

## 2025-01-20 RX ORDER — ONDANSETRON 2 MG/ML
4 INJECTION INTRAMUSCULAR; INTRAVENOUS EVERY 6 HOURS PRN
Status: DISCONTINUED | OUTPATIENT
Start: 2025-01-20 | End: 2025-01-22 | Stop reason: HOSPADM

## 2025-01-20 RX ORDER — FENTANYL CITRATE 50 UG/ML
INJECTION, SOLUTION INTRAMUSCULAR; INTRAVENOUS
Status: DISCONTINUED | OUTPATIENT
Start: 2025-01-20 | End: 2025-01-20 | Stop reason: SDUPTHER

## 2025-01-20 RX ORDER — DEXAMETHASONE SODIUM PHOSPHATE 10 MG/ML
INJECTION, SOLUTION INTRAMUSCULAR; INTRAVENOUS
Status: COMPLETED | OUTPATIENT
Start: 2025-01-20 | End: 2025-01-20

## 2025-01-20 RX ORDER — MIDAZOLAM HYDROCHLORIDE 1 MG/ML
INJECTION, SOLUTION INTRAMUSCULAR; INTRAVENOUS
Status: DISCONTINUED | OUTPATIENT
Start: 2025-01-20 | End: 2025-01-20 | Stop reason: SDUPTHER

## 2025-01-20 RX ORDER — PHENYLEPHRINE HYDROCHLORIDE 10 MG/ML
INJECTION INTRAVENOUS
Status: DISCONTINUED | OUTPATIENT
Start: 2025-01-20 | End: 2025-01-20 | Stop reason: SDUPTHER

## 2025-01-20 RX ORDER — CELECOXIB 200 MG/1
200 CAPSULE ORAL ONCE
Status: CANCELLED | OUTPATIENT
Start: 2025-01-20 | End: 2025-01-20

## 2025-01-20 RX ORDER — PROPOFOL 10 MG/ML
INJECTION, EMULSION INTRAVENOUS
Status: DISCONTINUED | OUTPATIENT
Start: 2025-01-20 | End: 2025-01-20 | Stop reason: SDUPTHER

## 2025-01-20 RX ORDER — SODIUM CHLORIDE 0.9 % (FLUSH) 0.9 %
5-40 SYRINGE (ML) INJECTION PRN
Status: CANCELLED | OUTPATIENT
Start: 2025-01-20

## 2025-01-20 RX ORDER — KETOROLAC TROMETHAMINE 30 MG/ML
30 INJECTION, SOLUTION INTRAMUSCULAR; INTRAVENOUS EVERY 6 HOURS PRN
Status: DISCONTINUED | OUTPATIENT
Start: 2025-01-20 | End: 2025-01-22 | Stop reason: HOSPADM

## 2025-01-20 RX ORDER — DIPHENHYDRAMINE HYDROCHLORIDE 50 MG/ML
12.5 INJECTION INTRAMUSCULAR; INTRAVENOUS
Status: DISCONTINUED | OUTPATIENT
Start: 2025-01-20 | End: 2025-01-20 | Stop reason: HOSPADM

## 2025-01-20 RX ORDER — ZOLPIDEM TARTRATE 5 MG/1
5 TABLET ORAL ONCE
Status: COMPLETED | OUTPATIENT
Start: 2025-01-21 | End: 2025-01-20

## 2025-01-20 RX ORDER — ONDANSETRON 2 MG/ML
4 INJECTION INTRAMUSCULAR; INTRAVENOUS
Status: DISCONTINUED | OUTPATIENT
Start: 2025-01-20 | End: 2025-01-20 | Stop reason: HOSPADM

## 2025-01-20 RX ORDER — BUPIVACAINE HYDROCHLORIDE 2.5 MG/ML
INJECTION, SOLUTION INFILTRATION; PERINEURAL PRN
Status: DISCONTINUED | OUTPATIENT
Start: 2025-01-20 | End: 2025-01-20 | Stop reason: ALTCHOICE

## 2025-01-20 RX ORDER — NALOXONE HYDROCHLORIDE 0.4 MG/ML
INJECTION, SOLUTION INTRAMUSCULAR; INTRAVENOUS; SUBCUTANEOUS PRN
Status: DISCONTINUED | OUTPATIENT
Start: 2025-01-20 | End: 2025-01-20 | Stop reason: HOSPADM

## 2025-01-20 RX ORDER — OXYCODONE AND ACETAMINOPHEN 5; 325 MG/1; MG/1
1 TABLET ORAL EVERY 4 HOURS PRN
Status: DISCONTINUED | OUTPATIENT
Start: 2025-01-20 | End: 2025-01-22 | Stop reason: HOSPADM

## 2025-01-20 RX ORDER — FENTANYL CITRATE 50 UG/ML
50 INJECTION, SOLUTION INTRAMUSCULAR; INTRAVENOUS EVERY 5 MIN PRN
Status: DISCONTINUED | OUTPATIENT
Start: 2025-01-20 | End: 2025-01-20 | Stop reason: HOSPADM

## 2025-01-20 RX ORDER — MEPERIDINE HYDROCHLORIDE 25 MG/ML
12.5 INJECTION INTRAMUSCULAR; INTRAVENOUS; SUBCUTANEOUS EVERY 5 MIN PRN
Status: DISCONTINUED | OUTPATIENT
Start: 2025-01-20 | End: 2025-01-20 | Stop reason: RX

## 2025-01-20 RX ORDER — SODIUM CHLORIDE 0.9 % (FLUSH) 0.9 %
5-40 SYRINGE (ML) INJECTION EVERY 12 HOURS SCHEDULED
Status: CANCELLED | OUTPATIENT
Start: 2025-01-20

## 2025-01-20 RX ORDER — BUPIVACAINE HYDROCHLORIDE 5 MG/ML
INJECTION, SOLUTION EPIDURAL; INTRACAUDAL
Status: COMPLETED | OUTPATIENT
Start: 2025-01-20 | End: 2025-01-20

## 2025-01-20 RX ORDER — OXYCODONE AND ACETAMINOPHEN 5; 325 MG/1; MG/1
2 TABLET ORAL EVERY 4 HOURS PRN
Status: DISCONTINUED | OUTPATIENT
Start: 2025-01-20 | End: 2025-01-22 | Stop reason: HOSPADM

## 2025-01-20 RX ORDER — LABETALOL HYDROCHLORIDE 5 MG/ML
10 INJECTION, SOLUTION INTRAVENOUS
Status: DISCONTINUED | OUTPATIENT
Start: 2025-01-20 | End: 2025-01-20 | Stop reason: HOSPADM

## 2025-01-20 RX ORDER — ASPIRIN 325 MG
325 TABLET ORAL 2 TIMES DAILY
Qty: 60 TABLET | Refills: 0 | Status: SHIPPED | OUTPATIENT
Start: 2025-01-20

## 2025-01-20 RX ORDER — GLYCOPYRROLATE 0.2 MG/ML
INJECTION INTRAMUSCULAR; INTRAVENOUS
Status: DISCONTINUED | OUTPATIENT
Start: 2025-01-20 | End: 2025-01-20 | Stop reason: SDUPTHER

## 2025-01-20 RX ORDER — CEPHALEXIN 500 MG/1
500 CAPSULE ORAL EVERY 8 HOURS
Qty: 21 CAPSULE | Refills: 0 | Status: SHIPPED | OUTPATIENT
Start: 2025-01-20 | End: 2025-01-27

## 2025-01-20 RX ORDER — ONDANSETRON 8 MG/1
4 TABLET, ORALLY DISINTEGRATING ORAL EVERY 8 HOURS PRN
Qty: 10 TABLET | Refills: 0 | Status: SHIPPED | OUTPATIENT
Start: 2025-01-20 | End: 2025-01-27

## 2025-01-20 RX ORDER — DEXAMETHASONE SODIUM PHOSPHATE 4 MG/ML
4 INJECTION, SOLUTION INTRA-ARTICULAR; INTRALESIONAL; INTRAMUSCULAR; INTRAVENOUS; SOFT TISSUE
Status: DISCONTINUED | OUTPATIENT
Start: 2025-01-20 | End: 2025-01-20 | Stop reason: HOSPADM

## 2025-01-20 RX ORDER — ONDANSETRON 2 MG/ML
INJECTION INTRAMUSCULAR; INTRAVENOUS
Status: DISCONTINUED | OUTPATIENT
Start: 2025-01-20 | End: 2025-01-20 | Stop reason: SDUPTHER

## 2025-01-20 RX ORDER — ACETAMINOPHEN 500 MG
1000 TABLET ORAL EVERY 6 HOURS
Status: DISCONTINUED | OUTPATIENT
Start: 2025-01-20 | End: 2025-01-22 | Stop reason: HOSPADM

## 2025-01-20 RX ORDER — HYDRALAZINE HYDROCHLORIDE 20 MG/ML
10 INJECTION INTRAMUSCULAR; INTRAVENOUS
Status: DISCONTINUED | OUTPATIENT
Start: 2025-01-20 | End: 2025-01-20 | Stop reason: HOSPADM

## 2025-01-20 RX ORDER — DEXMEDETOMIDINE HYDROCHLORIDE 100 UG/ML
INJECTION, SOLUTION INTRAVENOUS
Status: DISCONTINUED | OUTPATIENT
Start: 2025-01-20 | End: 2025-01-20 | Stop reason: SDUPTHER

## 2025-01-20 RX ORDER — ASPIRIN 325 MG
325 TABLET, DELAYED RELEASE (ENTERIC COATED) ORAL DAILY
Status: DISCONTINUED | OUTPATIENT
Start: 2025-01-21 | End: 2025-01-22 | Stop reason: HOSPADM

## 2025-01-20 RX ORDER — OXYCODONE AND ACETAMINOPHEN 5; 325 MG/1; MG/1
1 TABLET ORAL
Qty: 30 TABLET | Refills: 0 | Status: SHIPPED | OUTPATIENT
Start: 2025-01-20 | End: 2025-01-27

## 2025-01-20 RX ADMIN — DEXMEDETOMIDINE HYDROCHLORIDE 8 MCG: 100 INJECTION, SOLUTION INTRAVENOUS at 14:20

## 2025-01-20 RX ADMIN — PHENYLEPHRINE HYDROCHLORIDE 50 MCG: 10 INJECTION INTRAVENOUS at 13:58

## 2025-01-20 RX ADMIN — WATER 2000 MG: 1 INJECTION INTRAMUSCULAR; INTRAVENOUS; SUBCUTANEOUS at 20:14

## 2025-01-20 RX ADMIN — OXYCODONE HYDROCHLORIDE AND ACETAMINOPHEN 2 TABLET: 5; 325 TABLET ORAL at 20:15

## 2025-01-20 RX ADMIN — DEXMEDETOMIDINE HYDROCHLORIDE 4 MCG: 100 INJECTION, SOLUTION INTRAVENOUS at 14:27

## 2025-01-20 RX ADMIN — PROPOFOL 150 MG: 10 INJECTION, EMULSION INTRAVENOUS at 13:07

## 2025-01-20 RX ADMIN — MIDAZOLAM 2 MG: 1 INJECTION INTRAMUSCULAR; INTRAVENOUS at 11:27

## 2025-01-20 RX ADMIN — ZOLPIDEM TARTRATE 5 MG: 5 TABLET ORAL at 19:03

## 2025-01-20 RX ADMIN — ZOLPIDEM TARTRATE 5 MG: 5 TABLET ORAL at 23:51

## 2025-01-20 RX ADMIN — DEXAMETHASONE SODIUM PHOSPHATE 4 MG: 10 INJECTION INTRAMUSCULAR; INTRAVENOUS at 11:33

## 2025-01-20 RX ADMIN — HYDROMORPHONE HYDROCHLORIDE 0.5 MG: 1 INJECTION, SOLUTION INTRAMUSCULAR; INTRAVENOUS; SUBCUTANEOUS at 14:04

## 2025-01-20 RX ADMIN — GLYCOPYRROLATE 0.4 MG: 0.2 INJECTION INTRAMUSCULAR; INTRAVENOUS at 13:12

## 2025-01-20 RX ADMIN — ACETAMINOPHEN 1000 MG: 500 TABLET ORAL at 20:15

## 2025-01-20 RX ADMIN — FENTANYL CITRATE 100 MCG: 50 INJECTION INTRAMUSCULAR; INTRAVENOUS at 13:05

## 2025-01-20 RX ADMIN — WATER 2000 MG: 1 INJECTION INTRAMUSCULAR; INTRAVENOUS; SUBCUTANEOUS at 13:18

## 2025-01-20 RX ADMIN — SODIUM CHLORIDE, POTASSIUM CHLORIDE, SODIUM LACTATE AND CALCIUM CHLORIDE: 600; 310; 30; 20 INJECTION, SOLUTION INTRAVENOUS at 14:51

## 2025-01-20 RX ADMIN — PHENYLEPHRINE HYDROCHLORIDE 50 MCG: 10 INJECTION INTRAVENOUS at 13:43

## 2025-01-20 RX ADMIN — MORPHINE SULFATE 4 MG: 4 INJECTION, SOLUTION INTRAMUSCULAR; INTRAVENOUS at 09:14

## 2025-01-20 RX ADMIN — KETOROLAC TROMETHAMINE 30 MG: 30 INJECTION, SOLUTION INTRAMUSCULAR at 23:51

## 2025-01-20 RX ADMIN — OXYCODONE HYDROCHLORIDE AND ACETAMINOPHEN 1 TABLET: 5; 325 TABLET ORAL at 00:03

## 2025-01-20 RX ADMIN — SODIUM CHLORIDE, POTASSIUM CHLORIDE, SODIUM LACTATE AND CALCIUM CHLORIDE: 600; 310; 30; 20 INJECTION, SOLUTION INTRAVENOUS at 12:57

## 2025-01-20 RX ADMIN — BUPIVACAINE HYDROCHLORIDE 15 ML: 5 INJECTION, SOLUTION EPIDURAL; INTRACAUDAL; PERINEURAL at 11:33

## 2025-01-20 RX ADMIN — ONDANSETRON HYDROCHLORIDE 4 MG: 2 SOLUTION INTRAMUSCULAR; INTRAVENOUS at 14:18

## 2025-01-20 ASSESSMENT — PAIN DESCRIPTION - ONSET
ONSET: GRADUAL
ONSET: GRADUAL

## 2025-01-20 ASSESSMENT — PAIN SCALES - GENERAL
PAINLEVEL_OUTOF10: 3
PAINLEVEL_OUTOF10: 4
PAINLEVEL_OUTOF10: 8
PAINLEVEL_OUTOF10: 10
PAINLEVEL_OUTOF10: 8
PAINLEVEL_OUTOF10: 0
PAINLEVEL_OUTOF10: 5

## 2025-01-20 ASSESSMENT — PAIN - FUNCTIONAL ASSESSMENT
PAIN_FUNCTIONAL_ASSESSMENT: NONE - DENIES PAIN
PAIN_FUNCTIONAL_ASSESSMENT: ACTIVITIES ARE NOT PREVENTED
PAIN_FUNCTIONAL_ASSESSMENT: NONE - DENIES PAIN
PAIN_FUNCTIONAL_ASSESSMENT: NONE - DENIES PAIN
PAIN_FUNCTIONAL_ASSESSMENT: ACTIVITIES ARE NOT PREVENTED
PAIN_FUNCTIONAL_ASSESSMENT: NONE - DENIES PAIN
PAIN_FUNCTIONAL_ASSESSMENT: NONE - DENIES PAIN
PAIN_FUNCTIONAL_ASSESSMENT: 0-10

## 2025-01-20 ASSESSMENT — PAIN DESCRIPTION - DESCRIPTORS
DESCRIPTORS: DISCOMFORT;THROBBING
DESCRIPTORS: ACHING;SORE
DESCRIPTORS: ACHING;BURNING
DESCRIPTORS: ACHING;SORE
DESCRIPTORS: ACHING

## 2025-01-20 ASSESSMENT — PAIN DESCRIPTION - ORIENTATION
ORIENTATION: RIGHT

## 2025-01-20 ASSESSMENT — PAIN DESCRIPTION - PAIN TYPE
TYPE: SURGICAL PAIN
TYPE: SURGICAL PAIN

## 2025-01-20 ASSESSMENT — PAIN DESCRIPTION - LOCATION
LOCATION: KNEE

## 2025-01-20 ASSESSMENT — PAIN SCALES - WONG BAKER: WONGBAKER_NUMERICALRESPONSE: HURTS A LITTLE BIT

## 2025-01-20 ASSESSMENT — PAIN DESCRIPTION - FREQUENCY
FREQUENCY: INTERMITTENT
FREQUENCY: INTERMITTENT

## 2025-01-20 NOTE — PROGRESS NOTES
0700- Assumed care of the patient from off going nurse via bedside shift report. Patient resting in bed, alert.     Assessment complete. No needs voiced at this time. CBWR  Patient assisted to bathroom to void    0915- administered pain medication per patient request     1015- Patient off of unit via bed by surgery staff    Patient out of room for surgery     1545- Patient arrived back to room 257 vai bed by surgery staff, bedside shift report complete    1630- dinner provided- chicken noodle soup     1745- purewick placed for patient to void  Beverages provided per request     1855- rounded on patient- request PRN medication   Clear Yellow urine in purewick canister    1903- administered PRN medication per request

## 2025-01-20 NOTE — PROGRESS NOTES
0915 - Morphine given IV for pain rated 8/10 to right knee.   0920 - CHG bath completed. Patient ready for surgery.

## 2025-01-20 NOTE — PROGRESS NOTES
Physical Therapy  Orders received for P.T., but pt is having surgery this morning. Will discontinue current order and await new orders from ortho following procedure.  Daryn Ramirez, PT, DPT

## 2025-01-20 NOTE — PLAN OF CARE
Problem: Discharge Planning  Goal: Discharge to home or other facility with appropriate resources  1/20/2025 0235 by Emmy Mancera RN  Outcome: Progressing  1/19/2025 1244 by Saravanan White RN  Outcome: Progressing  Flowsheets (Taken 1/19/2025 1230)  Discharge to home or other facility with appropriate resources:   Identify barriers to discharge with patient and caregiver   Arrange for needed discharge resources and transportation as appropriate   Identify discharge learning needs (meds, wound care, etc)     Problem: Skin/Tissue Integrity  Goal: Absence of new skin breakdown  Description: 1.  Monitor for areas of redness and/or skin breakdown  2.  Assess vascular access sites hourly  3.  Every 4-6 hours minimum:  Change oxygen saturation probe site  4.  Every 4-6 hours:  If on nasal continuous positive airway pressure, respiratory therapy assess nares and determine need for appliance change or resting period.  1/20/2025 0235 by Emmy Mancera RN  Outcome: Progressing  1/19/2025 1244 by Saravanan White RN  Outcome: Progressing     Problem: Safety - Adult  Goal: Free from fall injury  1/20/2025 0235 by Emmy Mancera RN  Outcome: Progressing  1/19/2025 1244 by Saravanan White RN  Outcome: Progressing  Flowsheets (Taken 1/19/2025 1244)  Free From Fall Injury: Instruct family/caregiver on patient safety     Problem: ABCDS Injury Assessment  Goal: Absence of physical injury  1/20/2025 0235 by Emmy Mancera RN  Outcome: Progressing  1/19/2025 1244 by Saravanan White RN  Outcome: Progressing  Flowsheets (Taken 1/19/2025 1244)  Absence of Physical Injury: Implement safety measures based on patient assessment     Problem: Pain  Goal: Verbalizes/displays adequate comfort level or baseline comfort level  Outcome: Progressing

## 2025-01-20 NOTE — DISCHARGE INSTRUCTIONS
Lower Extremity Post-op Instructions:      Your first meal home should be clear liquids     Please take your blood thinner (Aspirin 325mg every 12 hours) as prescribed starting 24 hours after surgery. Do not discontinue until instructed to by Dr Orellana's office.     If you are given antibiotics, start antibiotics evening of the surgery unless otherwise instructed. Start Pain meds, the night you have surgery if you need is. No alcohol while on pain meds postop.    An Ice bag should be applied to your operative site for at least 20 minutes four times a day. DO NOT USE HEAT-this may cause increased swelling and discomfort. You may move your toes as tolerated.     You may bear weight as tolerated unless physical therapy has instructed you otherwise with the weightbearing status.    Dressing should remain in place for 5 days.     If you have a brace on or a splint on than those dressings and splint needs to remain in place until the follow-up appointment.    No driving if you have a splint or a brace on.    Swelling and discoloration may be present post-operatively. This is normal.    If your job requires little physical activity you may return as you feel able. If your job requires excessive lifting or use of affected extremity, please discuss return to work date with your nurse or physician.    If you are given a virtual appointment please make sure you have a smart phone with the camera.    If you need refill of pain medication, call the office 2 business days prior to running out of medication. Please call during regular business hours, Medication refill requests will not be addressed during non-business hours. Please do not page the on-call provider for pain medication refills after hours.    If you have had an arthroscopic procedure you will be provided with with pictures.  Please bring those pictures at the follow-up appointment.  If you have a virtual appointment please have the pictures readily available during

## 2025-01-20 NOTE — PROGRESS NOTES
INTERNAL MEDICINE PROGRESS NOTE      Patient: Olimpia Kendall   YOB: 1960   MRN: [id number]      Hospital course / Assessment and Plans   Principle Problems:  Acute right patellar fracture, POA:  -sent over as direct admit by Dr Orellana,   -S/P  ORIF per Dr. Orellana today  -Will continue with postop monitoring and management     Hx of depression: Continue fluoxetine home regimen  Hyperlipidemia: Continue atorvastatin.      Full Code   DVT prophylaxis: pharmacologic and mechanical    Disposition / Plans   Disposition: ? SNF per orthopedic       Subjective / ROS:   Patient states she is fine , no new complains       Medical Decision Making   Chart, Images and Lab data reviewed, necessary medical Orders placed   Discussed with nursing staff     Vitals:    25 1506 25 1511 25 1516 25 1531   BP: 125/64 118/69 111/71 117/72   Pulse: 95 89 91 83   Resp: 13 12 14 13   Temp:    98.2 °F (36.8 °C)   TempSrc:    Temporal   SpO2: 93% 93% 93% 93%   Weight:       Height:         Temp (24hrs), Av.4 °F (36.9 °C), Min:98.1 °F (36.7 °C), Max:99 °F (37.2 °C)      Intake/Output Summary (Last 24 hours) at 2025 1554  Last data filed at 2025 1500  Gross per 24 hour   Intake 1200 ml   Output --   Net 1200 ml       Physical Exam:   General Appearance:   Appears in no acute distress.,  HEENT:   Moist oral mucous membranes, conjunctiva clear,   Neck:   Supple  Lungs:    No wheezes., No rales., Normal respiratory effort,   Heart:   Regular rate and rhythm  Abdomen:   Soft , Non-distended and Non-tender,   Extremities:   no  edema of legs  Neuro:   alert, oriented, moves all extremities well    Current medications:     Current Facility-Administered Medications   Medication Dose Route Frequency    morphine injection 4 mg  4 mg IntraVENous Q4H PRN    oxyCODONE-acetaminophen (PERCOCET) 5-325 MG per tablet 1 tablet  1 tablet Oral Q4H PRN    ondansetron (ZOFRAN-ODT) disintegrating tablet 4 mg  4 mg Oral

## 2025-01-20 NOTE — ANESTHESIA PRE PROCEDURE
AM    K 4.1 10/22/2024 09:05 AM     10/22/2024 09:05 AM    CO2 24 10/22/2024 09:05 AM    BUN 16 10/22/2024 09:05 AM    CREATININE 0.67 10/22/2024 09:05 AM    GFRAA >60 08/06/2022 07:20 AM    AGRATIO 1.2 08/06/2022 07:20 AM    LABGLOM >90 10/22/2024 09:05 AM    LABGLOM 84 04/22/2024 08:42 AM    GLUCOSE 87 10/22/2024 09:05 AM    CALCIUM 9.0 10/22/2024 09:05 AM    BILITOT 0.5 08/06/2022 07:20 AM    ALKPHOS 126 08/06/2022 07:20 AM    AST 59 08/06/2022 07:20 AM    ALT 69 08/06/2022 07:20 AM       POC Tests: No results for input(s): \"POCGLU\", \"POCNA\", \"POCK\", \"POCCL\", \"POCBUN\", \"POCHEMO\", \"POCHCT\" in the last 72 hours.    Coags: No results found for: \"PROTIME\", \"INR\", \"APTT\"    HCG (If Applicable): No results found for: \"PREGTESTUR\", \"PREGSERUM\", \"HCG\", \"HCGQUANT\"     ABGs: No results found for: \"PHART\", \"PO2ART\", \"KDU0YQF\", \"JOK4HPK\", \"BEART\", \"V1HLFZFM\"     Type & Screen (If Applicable):  No results found for: \"ABORH\", \"LABANTI\"    Drug/Infectious Status (If Applicable):  No results found for: \"HIV\", \"HEPCAB\"    COVID-19 Screening (If Applicable): No results found for: \"COVID19\"        Anesthesia Evaluation  Patient summary reviewed and Nursing notes reviewed  Airway: Mallampati: II  TM distance: >3 FB   Neck ROM: full  Mouth opening: > = 3 FB   Dental:    (+) other  Comment: Chipped upper right incisor    Pulmonary:Negative Pulmonary ROS breath sounds clear to auscultation                             Cardiovascular:  Exercise tolerance: good (>4 METS)  (+) hyperlipidemia      ECG reviewed  Rhythm: regular  Rate: normal                    Neuro/Psych:   Negative Neuro/Psych ROS              GI/Hepatic/Renal: Neg GI/Hepatic/Renal ROS           ROS comment: Gastric bypass surgery.   Endo/Other:    (+) : arthritis: OA..                 Abdominal: normal exam            Vascular: negative vascular ROS.         Other Findings:         Anesthesia Plan      regional and general     ASA 2       Induction:

## 2025-01-20 NOTE — ANESTHESIA POSTPROCEDURE EVALUATION
Department of Anesthesiology  Postprocedure Note    Patient: Olimpia Kendall  MRN: [id number]  YOB: 1960  Date of evaluation: 1/20/2025    Procedure Summary       Date: 01/20/25 Room / Location: [facility] 01 / [facility] MAIN OR    Anesthesia Start: 1302 Anesthesia Stop: 1507    Procedure: RIGHT ORIF PATELLA FRACTURE (Right: Knee) Diagnosis:       Closed displaced transverse fracture of right patella with routine healing      (Closed displaced transverse fracture of right patella with routine healing [S82.031D])    Surgeons: Lane Orellana MD Responsible Provider: Ivana Zheng APRN - CRNA    Anesthesia Type: Regional, General ASA Status: Not recorded            Anesthesia Type: Regional, General    Joan Phase I: Joan Score: 8    Joan Phase II:      Anesthesia Post Evaluation    Patient location during evaluation: PACU  Patient participation: complete - patient participated  Level of consciousness: sleepy but conscious  Pain score: 0 (Femoral block assessed)  Airway patency: patent  Nausea & Vomiting: no nausea and no vomiting  Cardiovascular status: hemodynamically stable  Respiratory status: acceptable, spontaneous ventilation and nasal cannula  Hydration status: stable  Comments: Ok to discharge once criteria met  Multimodal analgesia pain management approach  Pain management: adequate and satisfactory to patient    No notable events documented.

## 2025-01-20 NOTE — ANESTHESIA PROCEDURE NOTES
Peripheral Block    Patient location during procedure: pre-op  Reason for block: post-op pain management  Start time: 1/20/2025 11:27 AM  End time: 1/20/2025 11:33 AM  Staffing  Performed: resident/CRNA   Resident/CRNA: Ivana Zheng APRN - CRNA  Performed by: Ivana Zheng APRN - CRNA  Authorized by: Ivana Zheng APRN - CRNA    Preanesthetic Checklist  Completed: patient identified, IV checked, site marked, risks and benefits discussed, surgical/procedural consents, equipment checked, pre-op evaluation, timeout performed, anesthesia consent given, oxygen available, monitors applied/VS acknowledged, fire risk safety assessment completed and verbalized and blood product R/B/A discussed and consented  Peripheral Block   Patient position: supine  Prep: ChloraPrep  Provider prep: mask  Patient monitoring: continuous pulse ox, frequent blood pressure checks, IV access, oxygen and responsive to questions  Block type: Femoral  Femoral crease  Laterality: right  Injection technique: single-shot  Guidance: ultrasound guided    Needle   Needle type: short-bevel   Needle gauge: 20 G  Needle localization: ultrasound guidance  Needle length: 10 cm  Assessment   Injection assessment: negative aspiration for heme, no paresthesia on injection, local visualized surrounding nerve on ultrasound and no intravascular symptoms  Paresthesia pain: none  Slow fractionated injection: yes  Hemodynamics: stable  Outcomes: uncomplicated and patient tolerated procedure well    Additional Notes  Pt reported immediate significant pain relief as well as visualized quadriceps weakness. Pt had no complaints throughout block, adequate circumferential spread noted.  Medications Administered  dexAMETHasone (DECADRON) (PF) 10 mg/mL injection - Other   4 mg - 1/20/2025 11:33:00 AM  BUPivacaine (MARCAINE) PF injection 0.5% - Perineural   15 mL - 1/20/2025 11:33:00 AM

## 2025-01-20 NOTE — PLAN OF CARE
Problem: Discharge Planning  Goal: Discharge to home or other facility with appropriate resources  1/20/2025 1013 by Talya Talavera RN  Outcome: Progressing  1/20/2025 0235 by Emmy Mancera RN  Outcome: Progressing     Problem: Skin/Tissue Integrity  Goal: Absence of new skin breakdown  Description: 1.  Monitor for areas of redness and/or skin breakdown  2.  Assess vascular access sites hourly  3.  Every 4-6 hours minimum:  Change oxygen saturation probe site  4.  Every 4-6 hours:  If on nasal continuous positive airway pressure, respiratory therapy assess nares and determine need for appliance change or resting period.  1/20/2025 1013 by Talya Talavera RN  Outcome: Progressing  1/20/2025 0235 by Emmy Mancera RN  Outcome: Progressing     Problem: Safety - Adult  Goal: Free from fall injury  1/20/2025 1013 by Talya Talavera RN  Outcome: Progressing  1/20/2025 0235 by Emmy Mancera RN  Outcome: Progressing     Problem: ABCDS Injury Assessment  Goal: Absence of physical injury  1/20/2025 1013 by Talya Talavera RN  Outcome: Progressing  1/20/2025 0235 by Emmy Mancera RN  Outcome: Progressing     Problem: Pain  Goal: Verbalizes/displays adequate comfort level or baseline comfort level  1/20/2025 1013 by Talya Talavera RN  Outcome: Progressing  1/20/2025 0235 by Emmy Mancera RN  Outcome: Progressing

## 2025-01-20 NOTE — OP NOTE
Operative Note    Patient: Olimpia Kendall MRN: [id number]  Surgery Date: 1/19/2025 - 1/20/2025           Procedure  Primary Surgeon    RIGHT ORIF PATELLA FRACTURE  Lane Orellana MD    * Panel 2 does not exist *  * Panel 2 does not exist *    * Panel 3 does not exist *  * Panel 3 does not exist *     Surgeons and Role:     * Lane Orellana MD - Primary    Other OR Staff/Assistants:  Circulator: Kavita Rodrigez RN  Surgical Assistant: Kathy Jean-Baptiste  Scrub Person First: Bertha Almanza RN    1st Assistant Tasks:  Closing    Pre-operative Diagnosis:  Closed displaced transverse fracture of right patella with routine healing [S82.031D]    Post-operative Diagnosis: same as preop diagnosis    Anesthesia Type: General     Findings: Comminuted distal patella fracture with an oblique pattern as well as distal one third component with a small fragment distally, torn retinaculum and VMO is well    Complications: No    EBL: 50 cc    Specimens: None    Implants:   Implants       Type Not Specified    (Historical) Blade Sprl F/Hum Nl 40mm Ti -- - Implanted   (Right) Shoulder      Model/Cat number: [id number] : [employer]CR    Lot number: N/[id number]        As of 5/12/2022       Status: Unknown                      (Historical) Endcap Hum T25 Strdrv 0mm -- Ti - Implanted   (Right) Shoulder      Model/Cat number: [id number] : [employer] [place]_CR    Lot number: N/[id number]        As of 5/12/2022       Status: Unknown                      (Historical) Nail Hum Isabelle 8g625ma Strl -- Ti Expert - Implanted   (Right) Shoulder      Model/Cat number: [id number] : [employer] [place]_CR    Lot number: [id number]        As of 5/12/2022       Status: Unknown                      (Historical) Scr Bne Lck T25 4x24mm Ti -- - Implanted   (Right) Shoulder      Model/Cat number: [id number] : [employer] [place]CR    Lot number: [id number]        As of 5/12/2022       Status: Unknown                            Procedure: Right patella

## 2025-01-20 NOTE — PROGRESS NOTES
1900 bedside report received on pt.      2000 assisted pt to bedside commode. Pt back in bed    2050 PRN morphine given for 10/10 right knee pain. PRN Ambien given per pt request    Pain improved    0003 PRN Percocet given for 5/10 right knee pain    0033 pain improved. Pt satisfied    0041 vitals obtained    0300 rounded on pt. Pt in bed eyes closed. respiratory patterns noted     0715 report given to JAYCE Talavera RN

## 2025-01-20 NOTE — PROGRESS NOTES
TRANSFER - OUT REPORT:    Verbal report given to JAYCE Talavera RN on Olimpia Kendall  being transferred to [facility] for routine post-op       Report consisted of patient's Situation, Background, Assessment and   Recommendations(SBAR).     Information from the following report(s) Nurse Handoff Report, Adult Overview, Surgery Report, MAR, and Quality Measures was reviewed with the receiving nurse.           Lines:   Peripheral IV 01/20/25 Left;Ventral Wrist (Active)   Site Assessment Clean, dry & intact 01/20/25 1506   Line Status Infusing 01/20/25 1506   Phlebitis Assessment No symptoms 01/20/25 1506   Infiltration Assessment 0 01/20/25 1506   Alcohol Cap Used No 01/20/25 1506   Dressing Status Clean, dry & intact 01/20/25 1506   Dressing Type Transparent 01/20/25 1506        Opportunity for questions and clarification was provided.      Patient transported with:  O2 @ 2lpm.    VSS: 93% on 2 LPM, 13, 89, 128/94.

## 2025-01-21 PROCEDURE — 6370000000 HC RX 637 (ALT 250 FOR IP): Performed by: NURSE PRACTITIONER

## 2025-01-21 PROCEDURE — 6360000002 HC RX W HCPCS

## 2025-01-21 PROCEDURE — 97530 THERAPEUTIC ACTIVITIES: CPT

## 2025-01-21 PROCEDURE — 1100000000 HC RM PRIVATE

## 2025-01-21 PROCEDURE — 2500000003 HC RX 250 WO HCPCS: Performed by: INTERNAL MEDICINE

## 2025-01-21 PROCEDURE — 6370000000 HC RX 637 (ALT 250 FOR IP)

## 2025-01-21 PROCEDURE — 6360000002 HC RX W HCPCS: Performed by: NURSE PRACTITIONER

## 2025-01-21 PROCEDURE — 97162 PT EVAL MOD COMPLEX 30 MIN: CPT

## 2025-01-21 PROCEDURE — 2500000003 HC RX 250 WO HCPCS

## 2025-01-21 PROCEDURE — 94761 N-INVAS EAR/PLS OXIMETRY MLT: CPT

## 2025-01-21 RX ORDER — CALCIUM CARBONATE 500 MG/1
500 TABLET, CHEWABLE ORAL 3 TIMES DAILY PRN
Status: DISCONTINUED | OUTPATIENT
Start: 2025-01-21 | End: 2025-01-22 | Stop reason: HOSPADM

## 2025-01-21 RX ORDER — SODIUM CHLORIDE 0.9 % (FLUSH) 0.9 %
5-40 SYRINGE (ML) INJECTION 2 TIMES DAILY
Status: DISCONTINUED | OUTPATIENT
Start: 2025-01-21 | End: 2025-01-22 | Stop reason: HOSPADM

## 2025-01-21 RX ADMIN — ATORVASTATIN CALCIUM 10 MG: 10 TABLET, FILM COATED ORAL at 09:05

## 2025-01-21 RX ADMIN — KETOROLAC TROMETHAMINE 30 MG: 30 INJECTION, SOLUTION INTRAMUSCULAR at 20:53

## 2025-01-21 RX ADMIN — OXYCODONE HYDROCHLORIDE AND ACETAMINOPHEN 2 TABLET: 5; 325 TABLET ORAL at 12:41

## 2025-01-21 RX ADMIN — ACETAMINOPHEN 1000 MG: 500 TABLET ORAL at 04:07

## 2025-01-21 RX ADMIN — ENOXAPARIN SODIUM 40 MG: 100 INJECTION SUBCUTANEOUS at 09:05

## 2025-01-21 RX ADMIN — OXYCODONE HYDROCHLORIDE AND ACETAMINOPHEN 2 TABLET: 5; 325 TABLET ORAL at 17:07

## 2025-01-21 RX ADMIN — ZOLPIDEM TARTRATE 5 MG: 5 TABLET ORAL at 20:55

## 2025-01-21 RX ADMIN — Medication 2000 UNITS: at 09:05

## 2025-01-21 RX ADMIN — ASPIRIN 325 MG: 325 TABLET, COATED ORAL at 09:05

## 2025-01-21 RX ADMIN — CYANOCOBALAMIN TAB 500 MCG 1000 MCG: 500 TAB at 09:05

## 2025-01-21 RX ADMIN — FLUOXETINE HYDROCHLORIDE 20 MG: 20 CAPSULE ORAL at 09:05

## 2025-01-21 RX ADMIN — WATER 2000 MG: 1 INJECTION INTRAMUSCULAR; INTRAVENOUS; SUBCUTANEOUS at 04:08

## 2025-01-21 RX ADMIN — OXYCODONE HYDROCHLORIDE AND ACETAMINOPHEN 2 TABLET: 5; 325 TABLET ORAL at 20:54

## 2025-01-21 RX ADMIN — SODIUM CHLORIDE, PRESERVATIVE FREE 10 ML: 5 INJECTION INTRAVENOUS at 20:57

## 2025-01-21 ASSESSMENT — ENCOUNTER SYMPTOMS
GASTROINTESTINAL NEGATIVE: 1
EYES NEGATIVE: 1
ALLERGIC/IMMUNOLOGIC NEGATIVE: 1
RESPIRATORY NEGATIVE: 1

## 2025-01-21 ASSESSMENT — PAIN DESCRIPTION - ORIENTATION
ORIENTATION: RIGHT

## 2025-01-21 ASSESSMENT — PAIN DESCRIPTION - LOCATION
LOCATION: KNEE

## 2025-01-21 ASSESSMENT — PAIN SCALES - GENERAL
PAINLEVEL_OUTOF10: 0
PAINLEVEL_OUTOF10: 7
PAINLEVEL_OUTOF10: 0
PAINLEVEL_OUTOF10: 10
PAINLEVEL_OUTOF10: 7
PAINLEVEL_OUTOF10: 10
PAINLEVEL_OUTOF10: 6
PAINLEVEL_OUTOF10: 5
PAINLEVEL_OUTOF10: 10
PAINLEVEL_OUTOF10: 5
PAINLEVEL_OUTOF10: 8

## 2025-01-21 ASSESSMENT — PAIN DESCRIPTION - DESCRIPTORS
DESCRIPTORS: THROBBING
DESCRIPTORS: GNAWING
DESCRIPTORS: THROBBING
DESCRIPTORS: THROBBING

## 2025-01-21 NOTE — PROGRESS NOTES
0700-Bedside shift change report given to ASHLEE Ventura and ASHLEE Oliver (oncoming nurse) by ASHLEE Kolb (offgoing nurse). Report included the following information Nurse Handoff Report, Adult Overview, MAR, and Event Log- Assumed care    Vitals obtained-Whiteboard updated-Safety measures implemented    0830-Shift assessment completed- Pt displays no signs of distress at this time- NP at bedside- No pt concerns at this time- Breakfast tray provided    0905- Medications administered per MAR protocol- Pt tolerated well- Assisted pt with repositioning    1000- Physical therapy at pt bedside- Expresses no concerns at this time    1120- Assisted pt to bathroom via walker- Pt tolerated well- No signs of pain or distress noted at this time    1241- Pain medication administered per patient request for 8/10 pain- Water provided- Repositioned    1430- Assisted pt to bathroom and back to bed- Pt tolerated well- No further needs at this time    1707- Prn pain medication administered per patient request for 10/10 pain- Pt tolerated well    1820-Assisted pt to bathroom- Pt tolerated well- Assisted back to bed, foot pump placed back on foot- Pt resting in bed    1857-Bedside shift change report given to ASHLEE Abrams (oncoming nurse) by ASHLEE Ventura and ASHLEE Oliver (offgoing nurse). Report included the following information Nurse Handoff Report, Adult Overview, MAR, and Event Log.

## 2025-01-21 NOTE — CASE COMMUNICATION
Discharge Planning/Transition of Care RUR %  Pt has been accepted at [facility] on 1/22/25 after Covid has been done.

## 2025-01-21 NOTE — PLAN OF CARE
Problem: Discharge Planning  Goal: Discharge to home or other facility with appropriate resources  1/21/2025 1039 by Rubin Tafoya RN  Outcome: Progressing  1/21/2025 0356 by Kennedi Hagan RN  Outcome: Progressing  Flowsheets (Taken 1/21/2025 0356)  Discharge to home or other facility with appropriate resources:   Identify discharge learning needs (meds, wound care, etc)   Identify barriers to discharge with patient and caregiver     Problem: Skin/Tissue Integrity  Goal: Absence of new skin breakdown  Description: 1.  Monitor for areas of redness and/or skin breakdown  2.  Assess vascular access sites hourly  3.  Every 4-6 hours minimum:  Change oxygen saturation probe site  4.  Every 4-6 hours:  If on nasal continuous positive airway pressure, respiratory therapy assess nares and determine need for appliance change or resting period.  1/21/2025 1039 by Rubin Tafoya RN  Outcome: Progressing  1/21/2025 0356 by Kennedi Hagan RN  Outcome: Progressing  Note: Monitor incision and surgical dressing to right knee.     Problem: Safety - Adult  Goal: Free from fall injury  1/21/2025 1039 by Rubin Tafoya RN  Outcome: Progressing  1/21/2025 0356 by Kennedi Hagan RN  Outcome: Progressing  Flowsheets (Taken 1/21/2025 0356)  Free From Fall Injury: Instruct family/caregiver on patient safety  Note: PT/OT consult      Problem: ABCDS Injury Assessment  Goal: Absence of physical injury  Outcome: Progressing     Problem: Pain  Goal: Verbalizes/displays adequate comfort level or baseline comfort level  1/21/2025 1039 by Rubin Tafoya RN  Outcome: Progressing  1/21/2025 0356 by Kennedi Hagan RN  Outcome: Progressing  Flowsheets (Taken 1/21/2025 0356)  Verbalizes/displays adequate comfort level or baseline comfort level:   Encourage patient to monitor pain and request assistance   Administer analgesics based on type and severity of pain and evaluate response   Assess pain using appropriate pain scale    Implement non-pharmacological measures as appropriate and evaluate response

## 2025-01-21 NOTE — THERAPY EVALUATION
PHYSICAL THERAPY EVALUATION    Patient: Olimpia Kendall (64 y.o. female)  Date: 1/21/2025  Physical Therapy Time:   PT Individual Minutes  Time In: 0930  Time Out: 1001  Minutes: 31  Timed Minute Breakdown:  20 [place] 11 T.A.     Primary Diagnosis: Closed patellar sleeve fracture of right knee, initial encounter [S82.993Z] Closed patellar sleeve fracture of right knee, initial encounter  Present on Admission:   Closed patellar sleeve fracture of right knee, initial encounter   Procedure(s) (LRB):  RIGHT ORIF PATELLA FRACTURE (Right) 1 Day Post-Op   Precautions:   Restrictions/Precautions  Restrictions/Precautions: Weight Bearing, ROM Restrictions, Other (Comment) (per ortho do not perform any SLRs at this time; minimize quad activation)  Activity Level: Up with Assist  Required Braces or Orthoses?: Yes Lower Extremity Weight Bearing Restrictions  Right Lower Extremity Weight Bearing: Toe Touch Weight Bearing  Required Braces or Orthoses  Right Lower Extremity Brace: Knee Immobilizer  WB Status: TTWB but she was told to try NWB for further safety and she was able to maintain    Assessment: Pt admitted s/p fall with R patella fracture, retinaculum and VMO tear. She is s/p patella ORIF and repair of retinaculum and VMO. She was educated thoroughly on precautions and safe d/c plan. She performs mobility very well and does well with ambulation. She was provided with a leg  to also assist with bed mobility. She returns to bed and is left with all needs met.  Performance Deficits/Impairments: Decreased functional mobility ;Decreased ADL status;Decreased ROM;Decreased strength;Decreased endurance  Treatment Diagnosis: difficulty in walking  Therapy Prognosis: Excellent  Decision Making: Medium Complexity  Discharge Recommendations: Subacute/Skilled Nursing Facility    Conditions Requiring skilled therapeutic intervention:  Performance Deficits/Impairments: Decreased functional mobility ;Decreased ADL status;Decreased

## 2025-01-21 NOTE — PROGRESS NOTES
Spiritual Health History and Assessment/Progress Note  [facility]    (P) Initial Encounter,  ,  ,      Name: Olimpia Kendall MRN: [id number]    Age: 64 y.o.     Sex: female   Language: English   Nondenominational: [employer]   Closed patellar sleeve fracture of right knee, initial encounter     Date: 1/21/2025            Total Time Calculated: (P) 9 min              Spiritual Assessment began in [address]        Referral/Consult From: (P) Rounding   Encounter Overview/Reason: (P) Initial Encounter  Service Provided For: (P) Patient    Renee, Belief, Meaning:   Patient is connected with a renee tradition or spiritual practice  Family/Friends No family/friends present      Importance and Influence:  Patient has spiritual/personal beliefs that influence decisions regarding their health  Family/Friends No family/friends present    Community:  Patient feels well-supported. Support system includes: Spouse/Partner, Friends, and Extended family  Family/Friends No family/friends present    Assessment and Plan of Care:     Patient Interventions include: Facilitated expression of thoughts and feelings  Family/Friends Interventions include: No family/friends present    Patient Plan of Care: Spiritual Care available upon further referral  Family/Friends Plan of Care: No family/friends present    Electronically signed by TRACY Miguel on 1/21/2025 at 3:36 PM

## 2025-01-21 NOTE — DISCHARGE SUMMARY
Discharge Summary       PATIENT ID: Olimpia Kendall  MRN: [id number]   YOB: 1960    DATE OF ADMISSION: 1/19/2025 11:57 AM    DATE OF DISCHARGE: 01/21/25    PRIMARY CARE PROVIDER: Alena Gautam APRN - NP     ATTENDING PHYSICIAN: Roseanne Graham MD  DISCHARGING PROVIDER: Roseanne Graham MD        CONSULTATIONS: IP CONSULT TO ORTHOPEDIC SURGERY  IP CONSULT TO CASE MANAGEMENT    PROCEDURES/SURGERIES: Procedure(s):  RIGHT ORIF PATELLA FRACTURE    Admission Diagnoses:   Closed patellar sleeve fracture of right knee, initial encounter [S82.091A]    Discharge Medications     Medication List        START taking these medications      aspirin 325 MG tablet  Take 1 tablet by mouth in the morning and at bedtime     cephALEXin 500 MG capsule  Commonly known as: Keflex  Take 1 capsule by mouth every 8 (eight) hours for 7 days Do not start medication until after surgery     ondansetron 8 MG Tbdp disintegrating tablet  Commonly known as: ZOFRAN-ODT  Take 0.5 tablets by mouth every 8 hours as needed for Nausea or Vomiting Do Not start until after surgery     oxyCODONE-acetaminophen 5-325 MG per tablet  Commonly known as: Percocet  Take 1 tablet by mouth every 4-6 hours as needed for Pain for up to 7 days. Do not start taking pain medication until after surgery! Max Daily Amount: 6 tablets            CONTINUE taking these medications      atorvastatin 10 MG tablet  Commonly known as: LIPITOR     cyanocobalamin 1000 MCG tablet     FLUoxetine 20 MG capsule  Commonly known as: PROZAC     LORazepam 0.5 MG tablet  Commonly known as: ATIVAN     vitamin D 50 MCG (2000 UT) Caps capsule  Commonly known as: CHOLECALCIFEROL     zolpidem 5 MG tablet  Commonly known as: AMBIEN            STOP taking these medications      baclofen 10 MG tablet  Commonly known as: LIORESAL               Where to Get Your Medications        These medications were sent to [facility] #[id number] [facility] [address] - P

## 2025-01-21 NOTE — PLAN OF CARE
Problem: Discharge Planning  Goal: Discharge to home or other facility with appropriate resources  Outcome: Progressing  Flowsheets (Taken 1/21/2025 0356)  Discharge to home or other facility with appropriate resources:   Identify discharge learning needs (meds, wound care, etc)   Identify barriers to discharge with patient and caregiver     Problem: Skin/Tissue Integrity  Goal: Absence of new skin breakdown  Description: 1.  Monitor for areas of redness and/or skin breakdown  2.  Assess vascular access sites hourly  3.  Every 4-6 hours minimum:  Change oxygen saturation probe site  4.  Every 4-6 hours:  If on nasal continuous positive airway pressure, respiratory therapy assess nares and determine need for appliance change or resting period.  Outcome: Progressing  Note: Monitor incision and surgical dressing to right knee.     Problem: Safety - Adult  Goal: Free from fall injury  Outcome: Progressing  Flowsheets (Taken 1/21/2025 0356)  Free From Fall Injury: Instruct family/caregiver on patient safety  Note: PT/OT consult      Problem: Pain  Goal: Verbalizes/displays adequate comfort level or baseline comfort level  Outcome: Progressing  Flowsheets (Taken 1/21/2025 0356)  Verbalizes/displays adequate comfort level or baseline comfort level:   Encourage patient to monitor pain and request assistance   Administer analgesics based on type and severity of pain and evaluate response   Assess pain using appropriate pain scale   Implement non-pharmacological measures as appropriate and evaluate response

## 2025-01-21 NOTE — CARE COORDINATION
01/20/25 1321   Service Assessment   Patient Orientation Alert and Oriented   Cognition Alert   History Provided By Patient   Primary Caregiver Self   Accompanied By/Relationship Alone   Support Systems Spouse/Significant Other   PCP Verified by CM Yes   Last Visit to PCP   (No recent visits to review.)   Prior Functional Level Independent in ADLs/IADLs   Current Functional Level Assistance with the following:;Mobility;Shopping;Housework   Ability to make needs known: Good   Family able to assist with home care needs: Yes   Would you like for me to discuss the discharge plan with any other family members/significant others, and if so, who? Yes  (spouse  Miguel)   Financial Resources Medicare;Other (Comment)  ([place])   Community Resources None   CM/SW Referral Other (see comment)  (Post acute needs already addressed.)   Condition of Participation: Discharge Planning   The Plan for Transition of Care is related to the following treatment goals: Pt states that she would like to stay \" 2 weeks in therapy and then go home.\"   The Patient and/or Patient Representative was provided with a Choice of Provider? Patient   The Patient and/Or Patient Representative agree with the Discharge Plan? Yes   Freedom of Choice list was provided with basic dialogue that supports the patient's individualized plan of care/goals, treatment preferences, and shares the quality data associated with the providers?  Yes     Plans for short stay in SNF discussed and referral sent.

## 2025-01-21 NOTE — PROGRESS NOTES
1/21/2025     7:36 AM 1/21/2025     4:06 AM 1/21/2025    12:33 AM 1/20/2025     8:15 PM 1/20/2025     7:35 PM 1/20/2025     7:10 PM 1/20/2025     3:31 PM   Ambulatory Bariatric Summary   Systolic  122 117  120  117   Diastolic  60 57  70  72   Pulse 73 63 70  97 90 83   Temp  97.9 °F (36.6 °C) 98.1 °F (36.7 °C)  98.1 °F (36.7 °C)  98.2 °F (36.8 °C)   Respirations 16 16 17 16 18 18 13       Body mass index is 28.1 kg/m².    Past Medical History:   Diagnosis Date    Abnormal nuclear cardiac imaging test 07/13/2012    Low risk.  No ischemia or prior infarction.  No RWMA.  EF 64%.  Nondiagnostic EKG on EST.  Ex time 10 min 41 sec.    Bell's palsy     HLD (hyperlipidemia)     Lump     Palm of the left hand    Osteoporosis        Past Surgical History:   Procedure Laterality Date    APPENDECTOMY      CHOLECYSTECTOMY      GASTRIC BYPASS SURGERY      HAND/FINGER SURGERY UNLISTED Left     cyst removal    HYSTERECTOMY (CERVIX STATUS UNKNOWN)  2008       Current Facility-Administered Medications   Medication Dose Route Frequency Provider Last Rate Last Admin    oxyCODONE-acetaminophen (PERCOCET) 5-325 MG per tablet 1 tablet  1 tablet Oral Q4H PRN Diana Hannon APRN - CNP        Or    oxyCODONE-acetaminophen (PERCOCET) 5-325 MG per tablet 2 tablet  2 tablet Oral Q4H PRN Diana Hannon APRN - CNP   2 tablet at 01/20/25 2015    ondansetron (ZOFRAN) injection 4 mg  4 mg IntraVENous Q6H PRN Diana Hannon APRN - CNP        Or    ondansetron (ZOFRAN-ODT) disintegrating tablet 8 mg  8 mg Oral Q8H PRN Diana Hannon APRN - CNP        aspirin EC tablet 325 mg  325 mg Oral Daily Diana Hannon APRN - CNP        ketorolac (TORADOL) injection 30 mg  30 mg IntraVENous Q6H PRN Diana Hannon APRN - CNP   30 mg at 01/20/25 1141    acetaminophen (TYLENOL) tablet 1,000 mg  1,000 mg Oral Q6H Hash, Ortiz Annalies, APRN - CNP   1,000 mg at 01/21/25 0407    morphine injection 4 mg  4 mg IntraVENous Q4H PRN

## 2025-01-22 VITALS
SYSTOLIC BLOOD PRESSURE: 121 MMHG | OXYGEN SATURATION: 99 % | RESPIRATION RATE: 16 BRPM | DIASTOLIC BLOOD PRESSURE: 72 MMHG | HEART RATE: 70 BPM | WEIGHT: 174.1 LBS | HEIGHT: 66 IN | TEMPERATURE: 97.9 F | BODY MASS INDEX: 27.98 KG/M2

## 2025-01-22 DIAGNOSIS — M25.561 RIGHT KNEE PAIN, UNSPECIFIED CHRONICITY: Primary | ICD-10-CM

## 2025-01-22 LAB
FLUAV RNA SPEC QL NAA+PROBE: NOT DETECTED
FLUBV RNA SPEC QL NAA+PROBE: NOT DETECTED
SARS-COV-2 RNA RESP QL NAA+PROBE: NOT DETECTED

## 2025-01-22 PROCEDURE — 6370000000 HC RX 637 (ALT 250 FOR IP): Performed by: NURSE PRACTITIONER

## 2025-01-22 PROCEDURE — 6360000002 HC RX W HCPCS

## 2025-01-22 PROCEDURE — 94761 N-INVAS EAR/PLS OXIMETRY MLT: CPT

## 2025-01-22 PROCEDURE — 6360000002 HC RX W HCPCS: Performed by: NURSE PRACTITIONER

## 2025-01-22 PROCEDURE — 2500000003 HC RX 250 WO HCPCS: Performed by: INTERNAL MEDICINE

## 2025-01-22 PROCEDURE — 87636 SARSCOV2 & INF A&B AMP PRB: CPT

## 2025-01-22 PROCEDURE — 6370000000 HC RX 637 (ALT 250 FOR IP)

## 2025-01-22 RX ADMIN — OXYCODONE HYDROCHLORIDE AND ACETAMINOPHEN 2 TABLET: 5; 325 TABLET ORAL at 12:58

## 2025-01-22 RX ADMIN — KETOROLAC TROMETHAMINE 30 MG: 30 INJECTION, SOLUTION INTRAMUSCULAR at 08:53

## 2025-01-22 RX ADMIN — CYANOCOBALAMIN TAB 500 MCG 1000 MCG: 500 TAB at 08:43

## 2025-01-22 RX ADMIN — Medication 2000 UNITS: at 08:43

## 2025-01-22 RX ADMIN — ATORVASTATIN CALCIUM 10 MG: 10 TABLET, FILM COATED ORAL at 08:43

## 2025-01-22 RX ADMIN — ASPIRIN 325 MG: 325 TABLET, COATED ORAL at 08:41

## 2025-01-22 RX ADMIN — ACETAMINOPHEN 1000 MG: 500 TABLET ORAL at 04:42

## 2025-01-22 RX ADMIN — FLUOXETINE HYDROCHLORIDE 20 MG: 20 CAPSULE ORAL at 08:41

## 2025-01-22 RX ADMIN — ENOXAPARIN SODIUM 40 MG: 100 INJECTION SUBCUTANEOUS at 08:43

## 2025-01-22 RX ADMIN — SODIUM CHLORIDE, PRESERVATIVE FREE 5 ML: 5 INJECTION INTRAVENOUS at 08:43

## 2025-01-22 RX ADMIN — ACETAMINOPHEN 1000 MG: 500 TABLET ORAL at 08:42

## 2025-01-22 ASSESSMENT — PAIN DESCRIPTION - LOCATION
LOCATION: KNEE

## 2025-01-22 ASSESSMENT — PAIN DESCRIPTION - ORIENTATION
ORIENTATION: RIGHT

## 2025-01-22 ASSESSMENT — PAIN SCALES - GENERAL
PAINLEVEL_OUTOF10: 8
PAINLEVEL_OUTOF10: 4
PAINLEVEL_OUTOF10: 8
PAINLEVEL_OUTOF10: 7
PAINLEVEL_OUTOF10: 8

## 2025-01-22 ASSESSMENT — PAIN DESCRIPTION - DESCRIPTORS: DESCRIPTORS: ACHING;PRESSURE

## 2025-01-22 NOTE — PLAN OF CARE
Problem: Discharge Planning  Goal: Discharge to home or other facility with appropriate resources  Outcome: Progressing  Flowsheets (Taken 1/21/2025 2030 by Jose Alberto Chu, RN)  Discharge to home or other facility with appropriate resources: Identify barriers to discharge with patient and caregiver     Problem: Skin/Tissue Integrity  Goal: Absence of new skin breakdown  Description: 1.  Monitor for areas of redness and/or skin breakdown  2.  Assess vascular access sites hourly  3.  Every 4-6 hours minimum:  Change oxygen saturation probe site  4.  Every 4-6 hours:  If on nasal continuous positive airway pressure, respiratory therapy assess nares and determine need for appliance change or resting period.  1/22/2025 1001 by Saravanan White RN  Outcome: Progressing  1/21/2025 2124 by Jose Alberto Chu RN  Outcome: Progressing     Problem: Safety - Adult  Goal: Free from fall injury  1/22/2025 1001 by Saravanan White RN  Outcome: Progressing  Flowsheets (Taken 1/21/2025 0356 by Kennedi Hagan RN)  Free From Fall Injury: Instruct family/caregiver on patient safety  1/21/2025 2124 by Jose Alberto Chu RN  Outcome: Progressing     Problem: ABCDS Injury Assessment  Goal: Absence of physical injury  1/22/2025 1001 by Saravanan White RN  Outcome: Progressing  Flowsheets (Taken 1/19/2025 1244)  Absence of Physical Injury: Implement safety measures based on patient assessment  1/21/2025 2124 by Jose Alberto Chu RN  Outcome: Progressing     Problem: Pain  Goal: Verbalizes/displays adequate comfort level or baseline comfort level  1/22/2025 1001 by Saravanan White RN  Outcome: Progressing  Flowsheets (Taken 1/21/2025 0356 by Kennedi Hagan RN)  Verbalizes/displays adequate comfort level or baseline comfort level:   Encourage patient to monitor pain and request assistance   Administer analgesics based on type and severity of pain and evaluate response   Assess pain using appropriate pain scale   Implement

## 2025-01-22 NOTE — PROGRESS NOTES
1/22/2025    12:58 PM 1/22/2025     7:52 AM 1/22/2025     7:25 AM 1/21/2025     9:24 PM 1/21/2025     8:54 PM 1/21/2025     7:30 PM 1/21/2025     5:37 PM   Ambulatory Bariatric Summary   Systolic  121    138    Diastolic  72    65    Pulse  70 67   63    Temp  97.9 °F (36.6 °C)    98.1 °F (36.7 °C)    Respirations 16 18 19 18 18 18 17       Body mass index is 28.1 kg/m².    Past Medical History:   Diagnosis Date    Abnormal nuclear cardiac imaging test 07/13/2012    Low risk.  No ischemia or prior infarction.  No RWMA.  EF 64%.  Nondiagnostic EKG on EST.  Ex time 10 min 41 sec.    Bell's palsy     HLD (hyperlipidemia)     Lump     Palm of the left hand    Osteoporosis        Past Surgical History:   Procedure Laterality Date    APPENDECTOMY      CHOLECYSTECTOMY      GASTRIC BYPASS SURGERY      HAND/FINGER SURGERY UNLISTED Left     cyst removal    HYSTERECTOMY (CERVIX STATUS UNKNOWN)  2008    PATELLA FRACTURE SURGERY Right 1/20/2025    RIGHT ORIF PATELLA FRACTURE performed by Lnae Orellana MD at [facility] MAIN OR       Current Facility-Administered Medications   Medication Dose Route Frequency Provider Last Rate Last Admin    calcium carbonate (TUMS) chewable tablet 500 mg  500 mg Oral TID PRN Diana Hannon APRN - CNP        sodium chloride flush 0.9 % injection 5-40 mL  5-40 mL IntraVENous BID Nic Dhillon MD   5 mL at 01/22/25 0843    oxyCODONE-acetaminophen (PERCOCET) 5-325 MG per tablet 1 tablet  1 tablet Oral Q4H PRN Diana Hannon APRN - CNP        Or    oxyCODONE-acetaminophen (PERCOCET) 5-325 MG per tablet 2 tablet  2 tablet Oral Q4H PRN Diana Hannon APRN - CNP   2 tablet at 01/22/25 1258    ondansetron (ZOFRAN) injection 4 mg  4 mg IntraVENous Q6H PRN Diana Hannon APRN - CNP        Or    ondansetron (ZOFRAN-ODT) disintegrating tablet 8 mg  8 mg Oral Q8H PRN Diana Hannon APRN - CNP        aspirin EC tablet 325 mg  325 mg Oral Daily Diana Hannon APRN - CNP

## 2025-01-22 NOTE — PROGRESS NOTES
1900 Assumed care of patient after shift report. Patient presented in bed awake watching TV.     2054 Patient c/o severe pain 10/10 PSR; she was given Percocet, Toradol and Ambien for c/o insomnia. The Tylenol scheduled for 2200 will be held.      2355 Resting quietly with eyes closed; respirations are even and unlabored. Patient reports pain medications were effective.

## 2025-01-22 NOTE — PLAN OF CARE
Problem: Discharge Planning  Goal: Discharge to home or other facility with appropriate resources  1/21/2025 1039 by Rubin Tafoya RN  Outcome: Progressing  Flowsheets (Taken 1/21/2025 0830)  Discharge to home or other facility with appropriate resources:   Identify barriers to discharge with patient and caregiver   Arrange for needed discharge resources and transportation as appropriate   Identify discharge learning needs (meds, wound care, etc)   Refer to discharge planning if patient needs post-hospital services based on physician order or complex needs related to functional status, cognitive ability or social support system     Problem: Skin/Tissue Integrity  Goal: Absence of new skin breakdown  Description: 1.  Monitor for areas of redness and/or skin breakdown  2.  Assess vascular access sites hourly  3.  Every 4-6 hours minimum:  Change oxygen saturation probe site  4.  Every 4-6 hours:  If on nasal continuous positive airway pressure, respiratory therapy assess nares and determine need for appliance change or resting period.  1/21/2025 2124 by Jose Alberto Chu RN  Outcome: Progressing  1/21/2025 1039 by Rubin Tafoya RN  Outcome: Progressing     Problem: Safety - Adult  Goal: Free from fall injury  1/21/2025 2124 by Jose Alberto Chu RN  Outcome: Progressing  1/21/2025 1039 by Rubin Tafoya RN  Outcome: Progressing     Problem: ABCDS Injury Assessment  Goal: Absence of physical injury  1/21/2025 2124 by Jose Alberto Chu RN  Outcome: Progressing  1/21/2025 1039 by Rubin Tafoya RN  Outcome: Progressing     Problem: Pain  Goal: Verbalizes/displays adequate comfort level or baseline comfort level  1/21/2025 2124 by Jose Alberto Chu RN  Outcome: Progressing  1/21/2025 1039 by Rubin Tafoya RN  Outcome: Progressing

## 2025-01-22 NOTE — DISCHARGE SUMMARY
Discharge Summary       PATIENT ID: Olimpia Kendall  MRN: [id number]   YOB: 1960    DATE OF ADMISSION: 1/19/2025 11:57 AM    DATE OF DISCHARGE: 01/22/25    PRIMARY CARE PROVIDER: Alena Gautam APRN - NP     ATTENDING PHYSICIAN: Nic Dhillon MD  DISCHARGING PROVIDER: Nic Dhillon MD        CONSULTATIONS: IP CONSULT TO ORTHOPEDIC SURGERY  IP CONSULT TO CASE MANAGEMENT    PROCEDURES/SURGERIES: Procedure(s):  RIGHT ORIF PATELLA FRACTURE    Admission Diagnoses:   Closed patellar sleeve fracture of right knee, initial encounter [S82.091A]    Discharge Medications     Medication List        START taking these medications      aspirin 325 MG tablet  Take 1 tablet by mouth in the morning and at bedtime     cephALEXin 500 MG capsule  Commonly known as: Keflex  Take 1 capsule by mouth every 8 (eight) hours for 7 days Do not start medication until after surgery     ondansetron 8 MG Tbdp disintegrating tablet  Commonly known as: ZOFRAN-ODT  Take 0.5 tablets by mouth every 8 hours as needed for Nausea or Vomiting Do Not start until after surgery     oxyCODONE-acetaminophen 5-325 MG per tablet  Commonly known as: Percocet  Take 1 tablet by mouth every 4-6 hours as needed for Pain for up to 7 days. Do not start taking pain medication until after surgery! Max Daily Amount: 6 tablets            CONTINUE taking these medications      atorvastatin 10 MG tablet  Commonly known as: LIPITOR     cyanocobalamin 1000 MCG tablet     FLUoxetine 20 MG capsule  Commonly known as: PROZAC     LORazepam 0.5 MG tablet  Commonly known as: ATIVAN     vitamin D 50 MCG (2000 UT) Caps capsule  Commonly known as: CHOLECALCIFEROL     zolpidem 5 MG tablet  Commonly known as: AMBIEN            STOP taking these medications      baclofen 10 MG tablet  Commonly known as: LIORESAL               Where to Get Your Medications        These medications were sent to [facility] #[id number] [place] [id number] - P [phone]

## 2025-01-24 ENCOUNTER — HOSPITAL ENCOUNTER (OUTPATIENT)
Facility: HOSPITAL | Age: 65
Discharge: HOME | End: 2025-01-27
Payer: MEDICARE

## 2025-01-24 ENCOUNTER — OFFICE VISIT (OUTPATIENT)
Age: 65
End: 2025-01-24

## 2025-01-24 DIAGNOSIS — M25.561 RIGHT KNEE PAIN, UNSPECIFIED CHRONICITY: ICD-10-CM

## 2025-01-24 DIAGNOSIS — S82.091S: Primary | ICD-10-CM

## 2025-01-24 PROCEDURE — 99024 POSTOP FOLLOW-UP VISIT: CPT | Performed by: ORTHOPAEDIC SURGERY

## 2025-01-24 PROCEDURE — 73564 X-RAY EXAM KNEE 4 OR MORE: CPT

## 2025-01-24 NOTE — PROGRESS NOTES
knee show well placed patella K wire in a figure-of-eight fashion.    Assessment & Plan  1. Postoperative status following open reduction and internal fixation (ORIF) of the right patella.  The surgical incision is clean, dry, and intact with decreased range of motion, mild swelling, and ecchymosis. X-rays show a well-placed patella K-wire in a figure-of-eight fashion. Continued conservative treatment is recommended, including strict non-weightbearing and the use of a knee immobilizer. The brace should not be removed except by Dr. Ware. New x-rays of the right knee (AP, lateral, obliques) will be obtained while in the brace. Continue DVT prophylaxis with aspirin.    PROCEDURE  The patient underwent ORIF of the right patella on 01/20/2025.    As part of continued conservative pain management options the patient was advised to utilize Tylenol or OTC NSAIDS as long as it is not medically contraindicated.     Return to Office:    Follow-up and Dispositions    Return in about 13 days (around 2/6/2025) for w/ Dr Ware w/ Xrays (NO ONE TO REMOVE BRACE EXCEPT DR. WARE), 1:00PM APPOINTMENT.        Scribed by Lane Ware MD as dictated by Lane Ware MD.  Documentation, performed by, True and Accepted Lane Ware MD    The patient (or guardian, if applicable) and other individuals in attendance with the patient were advised that Artificial Intelligence will be utilized during this visit to record, process the conversation to generate a clinical note, and support improvement of the AI technology. The patient (or guardian, if applicable) and other individuals in attendance at the appointment consented to the use of AI, including the recording.

## 2025-01-28 ENCOUNTER — TELEPHONE (OUTPATIENT)
Age: 65
End: 2025-01-28

## 2025-01-28 DIAGNOSIS — S82.091S: Primary | ICD-10-CM

## 2025-01-28 RX ORDER — OXYCODONE AND ACETAMINOPHEN 5; 325 MG/1; MG/1
1 TABLET ORAL
Qty: 30 TABLET | Refills: 0 | Status: SHIPPED | OUTPATIENT
Start: 2025-01-28 | End: 2025-01-29

## 2025-01-28 NOTE — TELEPHONE ENCOUNTER
Patient called in requesting pain medication refill.      Surgery: 1/19/2025       Medication: oxyCODONE-acetaminophen (PERCOCET)       Last Refill:1/20/2025       Pharmacy:  [facility] #[id number] [place] [id number] - P [phone] - F [phone]

## 2025-01-29 RX ORDER — HYDROCODONE BITARTRATE AND ACETAMINOPHEN 5; 325 MG/1; MG/1
1 TABLET ORAL
Qty: 30 TABLET | Refills: 0 | Status: SHIPPED | OUTPATIENT
Start: 2025-01-29 | End: 2025-02-05

## 2025-01-31 NOTE — PROGRESS NOTES
Physician Progress Note      PATIENT:               VIGNESH LYMAN  [facility] #:                  [id number]  :                       1960  ADMIT DATE:       2025 11:57 AM  DISCH DATE:        2025 2:55 PM  RESPONDING  PROVIDER #:        Nic Dhillon MD          QUERY TEXT:    Pt admitted with R patella fracture. Pt noted to have osteoporosis. If   possible, please document in progress notes and discharge summary if you are   evaluating and/or treating any of the following:    The medical record reflects the following:  Risk Factors: Osteoporosis    Clinical Indicators:    DCS:  significant for HLP, depression and osteoporosis    DEXA 1/3/24:  BMD measures consistent with osteoporosis.    Treatment: Right patella ORIF, retinacular repair, VMO repair; Vit D PTA  Options provided:  -- Osteoporotic R patella Fracture  -- Osteoporotic R patella fracture following fall which would not usually   break a normal, healthy bone  -- Other - I will add my own diagnosis  -- Disagree - Not applicable / Not valid  -- Disagree - Clinically unable to determine / Unknown  -- Refer to Clinical Documentation Reviewer    PROVIDER RESPONSE TEXT:    This patient has an osteoporotic R patella fracture following fall which would   not break a normal, healthy bone.    Query created by: Robina De La Vega on 2025 1:14 PM      Electronically signed by:  Nic Dhillon MD 2025 12:09 PM

## 2025-02-06 ENCOUNTER — OFFICE VISIT (OUTPATIENT)
Age: 65
End: 2025-02-06

## 2025-02-06 DIAGNOSIS — M25.561 RIGHT KNEE PAIN, UNSPECIFIED CHRONICITY: ICD-10-CM

## 2025-02-06 DIAGNOSIS — S82.091S: Primary | ICD-10-CM

## 2025-02-06 PROCEDURE — 99024 POSTOP FOLLOW-UP VISIT: CPT | Performed by: ORTHOPAEDIC SURGERY

## 2025-02-06 NOTE — PATIENT INSTRUCTIONS
as instructed.  Follow your doctor's instructions about activity during your healing process. If you can do mild exercise, slowly increase your activity.  Stay at a healthy weight. Extra weight can strain the joints, especially the knees and hips, and make the pain worse. Losing a few pounds may help.  When should you call for help?   Call 911 anytime you think you may need emergency care. For example, call if:    You have symptoms of a blood clot in your lung (called a pulmonary embolism). These may include:  Sudden chest pain.  Trouble breathing.  Coughing up blood.   Call your doctor now or seek immediate medical care if:    You have severe or increasing pain.     Your leg or foot turns cold or changes color.     You cannot stand or put weight on your knee.     Your knee looks twisted or bent out of shape.     You cannot move your knee.     You have signs of infection, such as:  Increased pain, swelling, warmth, or redness.  Red streaks leading from the knee.  Pus draining from a place on your knee.  A fever.     You have signs of a blood clot in your leg (called a deep vein thrombosis), such as:  Pain in your calf, back of the knee, thigh, or groin.  Redness and swelling in your leg or groin.   Watch closely for changes in your health, and be sure to contact your doctor if:    You have tingling, weakness, or numbness in your knee.     You have any new symptoms, such as swelling.     You have bruises from a knee injury that last longer than 2 weeks.     You do not get better as expected.   Where can you learn more?  Go to https://www.[employer].net/patientEd and enter K195 to learn more about \"Knee Pain or Injury: Care Instructions.\"  Current as of: March 9, 2022               Content Version: 13.5  © 5446-7934 [employer].   Care instructions adapted under license by [employer]. If you have questions about a medical condition or this instruction, always ask your healthcare professional.

## 2025-02-06 NOTE — PROGRESS NOTES
Name: Olimpia Kendall    : 1960     [facility]  [address], [address]  [place] [address]  Dept: [phone]  Dept Fax: [phone]     Chief Complaint   Patient presents with    Post-Op Check    Knee Pain        There were no vitals taken for this visit.     No Known Allergies     Current Outpatient Medications   Medication Sig Dispense Refill    aspirin 325 MG tablet Take 1 tablet by mouth in the morning and at bedtime 60 tablet 0    FLUoxetine (PROZAC) 20 MG capsule Take 1 capsule by mouth daily      LORazepam (ATIVAN) 0.5 MG tablet Take 1 tablet by mouth every 6 hours as needed for Anxiety.      Cholecalciferol (VITAMIN D) 50 MCG ( UT) CAPS capsule Take by mouth daily      atorvastatin (LIPITOR) 10 MG tablet Take 1 tablet by mouth daily      cyanocobalamin 1000 MCG tablet Take 1 tablet by mouth daily      zolpidem (AMBIEN) 5 MG tablet Take 2 tablets by mouth nightly as needed for Sleep.       No current facility-administered medications for this visit.       Patient Active Problem List   Diagnosis    Humeral head fracture, right, open, initial encounter    Osteoporosis, post-menopausal    Closed patellar sleeve fracture of right knee, initial encounter      Family History   Problem Relation Age of Onset    Diabetes Mother     Hypertension Mother     Osteoporosis Mother     Brain Cancer Father        Past Surgical History:   Procedure Laterality Date    APPENDECTOMY      CHOLECYSTECTOMY      GASTRIC BYPASS SURGERY      HAND/FINGER SURGERY UNLISTED Left     cyst removal    HYSTERECTOMY (CERVIX STATUS UNKNOWN)      PATELLA FRACTURE SURGERY Right 2025    RIGHT ORIF PATELLA FRACTURE performed by Lane Orellana MD at [facility] MAIN OR      Past Medical History:   Diagnosis Date    Abnormal nuclear cardiac imaging test 2012    Low risk.  No ischemia or prior infarction.  No RWMA.  EF 64%.  
Patient informed/Family informed/Explanation of wait

## 2025-02-10 ENCOUNTER — TELEPHONE (OUTPATIENT)
Age: 65
End: 2025-02-10

## 2025-02-10 DIAGNOSIS — S82.091S: Primary | ICD-10-CM

## 2025-02-10 RX ORDER — HYDROCODONE BITARTRATE AND ACETAMINOPHEN 5; 325 MG/1; MG/1
1 TABLET ORAL
Qty: 30 TABLET | Refills: 0 | Status: SHIPPED | OUTPATIENT
Start: 2025-02-10 | End: 2025-02-17

## 2025-02-10 NOTE — TELEPHONE ENCOUNTER
Patient called in requesting pain medication refill.      Surgery: rt orif patella fx 01/19/2025-01/20/2025      Medication: NORCO       Last Refill: 01/29/2025      Pharmacy:  [facility] #[id number] - [place], [place] - [address]  - P [phone] - F [phone] [address] [address]  Phone: [phone]  Fax: [phone]

## 2025-02-21 ENCOUNTER — TELEPHONE (OUTPATIENT)
Age: 65
End: 2025-02-21

## 2025-02-21 DIAGNOSIS — M25.561 RIGHT KNEE PAIN, UNSPECIFIED CHRONICITY: ICD-10-CM

## 2025-02-21 DIAGNOSIS — S82.091S: Primary | ICD-10-CM

## 2025-02-21 RX ORDER — HYDROCODONE BITARTRATE AND ACETAMINOPHEN 5; 325 MG/1; MG/1
1 TABLET ORAL
Qty: 30 TABLET | Refills: 0 | Status: SHIPPED | OUTPATIENT
Start: 2025-02-21 | End: 2025-02-28

## 2025-02-21 NOTE — TELEPHONE ENCOUNTER
Patient called in requesting pain medication refill.      Surgery: 1/20/25      Medication: HYDROcodone-acetaminophen (NORCO)       Last Refill:2/10/2025       Pharmacy:[facility] #[id number] - [place], [place] - [address] [phone] - F [phone]   [address] [address]    27-Dec-2001

## 2025-02-24 ENCOUNTER — OFFICE VISIT (OUTPATIENT)
Age: 65
End: 2025-02-24

## 2025-02-24 DIAGNOSIS — M25.561 RIGHT KNEE PAIN, UNSPECIFIED CHRONICITY: ICD-10-CM

## 2025-02-24 DIAGNOSIS — S82.091S: Primary | ICD-10-CM

## 2025-02-24 PROCEDURE — 99024 POSTOP FOLLOW-UP VISIT: CPT | Performed by: ORTHOPAEDIC SURGERY

## 2025-02-24 NOTE — PROGRESS NOTES
Name: Olimpia Kendall    : 1960     [facility]  [address], [address]  [facility] [address]  Dept: [phone]  Dept Fax: [phone]     Chief Complaint   Patient presents with    Post-Op Check    Knee Pain        There were no vitals taken for this visit.     No Known Allergies     Current Outpatient Medications   Medication Sig Dispense Refill    HYDROcodone-acetaminophen (NORCO) 5-325 MG per tablet Take 1 tablet by mouth every 4-6 hours as needed for Pain for up to 7 days. Wean off as pain becomes more tolerable. Max Daily Amount: 6 tablets 30 tablet 0    aspirin 325 MG tablet Take 1 tablet by mouth in the morning and at bedtime 60 tablet 0    FLUoxetine (PROZAC) 20 MG capsule Take 1 capsule by mouth daily      LORazepam (ATIVAN) 0.5 MG tablet Take 1 tablet by mouth every 6 hours as needed for Anxiety.      Cholecalciferol (VITAMIN D) 50 MCG (2000 UT) CAPS capsule Take by mouth daily      atorvastatin (LIPITOR) 10 MG tablet Take 1 tablet by mouth daily      cyanocobalamin 1000 MCG tablet Take 1 tablet by mouth daily      zolpidem (AMBIEN) 5 MG tablet Take 2 tablets by mouth nightly as needed for Sleep.       No current facility-administered medications for this visit.       Patient Active Problem List   Diagnosis    Humeral head fracture, right, open, initial encounter    Osteoporosis, post-menopausal    Closed patellar sleeve fracture of right knee, initial encounter      Family History   Problem Relation Age of Onset    Diabetes Mother     Hypertension Mother     Osteoporosis Mother     Brain Cancer Father        Past Surgical History:   Procedure Laterality Date    APPENDECTOMY      CHOLECYSTECTOMY      GASTRIC BYPASS SURGERY      HAND/FINGER SURGERY UNLISTED Left     cyst removal    HYSTERECTOMY (CERVIX STATUS UNKNOWN)      PATELLA FRACTURE SURGERY Right 2025    RIGHT ORIF PATELLA FRACTURE

## 2025-03-10 ENCOUNTER — TELEPHONE (OUTPATIENT)
Age: 65
End: 2025-03-10

## 2025-03-10 DIAGNOSIS — S82.091S: Primary | ICD-10-CM

## 2025-03-10 RX ORDER — HYDROCODONE BITARTRATE AND ACETAMINOPHEN 5; 325 MG/1; MG/1
1 TABLET ORAL
Qty: 30 TABLET | Refills: 0 | Status: SHIPPED | OUTPATIENT
Start: 2025-03-10 | End: 2025-03-17

## 2025-03-10 NOTE — TELEPHONE ENCOUNTER
Patient called in requesting pain medication refill.      Surgery: 1-20-25 rt orif patella fx       Medication:   HYDROcodone-acetaminophen (NORCO) 5-325 MG       Last Refill:2-21-25      Pharmacy:[facility][id number] [place] [id number] - P [phone] - F [phone]

## 2025-03-17 ENCOUNTER — OFFICE VISIT (OUTPATIENT)
Age: 65
End: 2025-03-17

## 2025-03-17 DIAGNOSIS — S82.091S: Primary | ICD-10-CM

## 2025-03-17 DIAGNOSIS — M25.561 RIGHT KNEE PAIN, UNSPECIFIED CHRONICITY: ICD-10-CM

## 2025-03-17 PROCEDURE — 99024 POSTOP FOLLOW-UP VISIT: CPT | Performed by: ORTHOPAEDIC SURGERY

## 2025-03-17 NOTE — PATIENT INSTRUCTIONS
Knee Pain or Injury: Care Instructions  Overview     Injuries are a common cause of knee problems. Sudden (acute) injuries may be caused by a direct blow to the knee. They can also be caused by abnormal twisting, bending, or falling on the knee. Pain, bruising, or swelling may be severe, and may start within minutes of the injury.  Overuse is another cause of knee pain. Other causes are climbing stairs, kneeling, and other activities that use the knee. Everyday wear and tear, especially as you get older, also can cause knee pain.  Rest, along with home treatment, often relieves pain and allows your knee to heal. If you have a serious knee injury, you may need tests and treatment.  Follow-up care is a key part of your treatment and safety. Be sure to make and go to all appointments, and call your doctor if you are having problems. It's also a good idea to know your test results and keep a list of the medicines you take.  How can you care for yourself at home?  Be safe with medicines. Read and follow all instructions on the label.  If the doctor gave you a prescription medicine for pain, take it as prescribed.  If you are not taking a prescription pain medicine, ask your doctor if you can take an over-the-counter medicine.  Rest and protect your knee. Take a break from any activity that may cause pain.  Put ice or a cold pack on your knee for 10 to 20 minutes at a time. Put a thin cloth between the ice and your skin.  Prop up a sore knee on a pillow when you ice it or anytime you sit or lie down for the next 3 days. Try to keep it above the level of your heart. This will help reduce swelling.  If your knee is not swollen, you can put moist heat, a heating pad, or a warm cloth on your knee.  If your doctor recommends an elastic bandage, sleeve, or other type of support for your knee, wear it as directed.  Follow your doctor's instructions about how much weight you can put on your leg. Use a cane, crutches, or a

## 2025-03-17 NOTE — PROGRESS NOTES
Name: Olimpia Kendall    : 1960     [facility]  [address], [address]  [facility] [address]  Dept: [phone]  Dept Fax: [phone]     Chief Complaint   Patient presents with    Post-Op Check    Knee Pain        There were no vitals taken for this visit.     No Known Allergies     Current Outpatient Medications   Medication Sig Dispense Refill    HYDROcodone-acetaminophen (NORCO) 5-325 MG per tablet Take 1 tablet by mouth every 4-6 hours as needed for Pain for up to 7 days. Wean off as pain becomes more tolerable. Max Daily Amount: 6 tablets 30 tablet 0    aspirin 325 MG tablet Take 1 tablet by mouth in the morning and at bedtime 60 tablet 0    FLUoxetine (PROZAC) 20 MG capsule Take 1 capsule by mouth daily      LORazepam (ATIVAN) 0.5 MG tablet Take 1 tablet by mouth every 6 hours as needed for Anxiety.      Cholecalciferol (VITAMIN D) 50 MCG (2000 UT) CAPS capsule Take by mouth daily      atorvastatin (LIPITOR) 10 MG tablet Take 1 tablet by mouth daily      cyanocobalamin 1000 MCG tablet Take 1 tablet by mouth daily      zolpidem (AMBIEN) 5 MG tablet Take 2 tablets by mouth nightly as needed for Sleep.       No current facility-administered medications for this visit.       Patient Active Problem List   Diagnosis    Humeral head fracture, right, open, initial encounter    Osteoporosis, post-menopausal    Closed patellar sleeve fracture of right knee, initial encounter      Family History   Problem Relation Age of Onset    Diabetes Mother     Hypertension Mother     Osteoporosis Mother     Brain Cancer Father        Past Surgical History:   Procedure Laterality Date    APPENDECTOMY      CHOLECYSTECTOMY      GASTRIC BYPASS SURGERY      HAND/FINGER SURGERY UNLISTED Left     cyst removal    HYSTERECTOMY (CERVIX STATUS UNKNOWN)      PATELLA FRACTURE SURGERY Right 2025    RIGHT ORIF PATELLA FRACTURE

## 2025-03-26 ENCOUNTER — TELEPHONE (OUTPATIENT)
Age: 65
End: 2025-03-26

## 2025-03-26 DIAGNOSIS — M25.561 RIGHT KNEE PAIN, UNSPECIFIED CHRONICITY: Primary | ICD-10-CM

## 2025-03-26 DIAGNOSIS — S82.091S: ICD-10-CM

## 2025-03-26 RX ORDER — HYDROCODONE BITARTRATE AND ACETAMINOPHEN 5; 325 MG/1; MG/1
1 TABLET ORAL
Qty: 30 TABLET | Refills: 0 | Status: SHIPPED | OUTPATIENT
Start: 2025-03-26 | End: 2025-04-02

## 2025-03-26 NOTE — TELEPHONE ENCOUNTER
Patient called in requesting pain medication refill.      Surgery:   RIGHT ORIF PATELLA FRACTURE 1.20.25    Medication:   HYDROcodone-acetaminophen (NORCO) 5-325 MG per tablet      Last Refill:  3.10.25    Pharmacy: [facility] #[id number] [place] [id number] - P [phone] - F [phone]

## 2025-04-07 ENCOUNTER — OFFICE VISIT (OUTPATIENT)
Age: 65
End: 2025-04-07

## 2025-04-07 ENCOUNTER — HOSPITAL ENCOUNTER (OUTPATIENT)
Age: 65
Discharge: HOME | End: 2025-04-10

## 2025-04-07 DIAGNOSIS — M25.561 RIGHT KNEE PAIN, UNSPECIFIED CHRONICITY: Primary | ICD-10-CM

## 2025-04-07 DIAGNOSIS — S82.091S: ICD-10-CM

## 2025-04-07 PROCEDURE — 99024 POSTOP FOLLOW-UP VISIT: CPT | Performed by: ORTHOPAEDIC SURGERY

## 2025-04-07 RX ORDER — ONDANSETRON 8 MG/1
4 TABLET, ORALLY DISINTEGRATING ORAL EVERY 8 HOURS PRN
Qty: 10 TABLET | Refills: 0 | Status: SHIPPED | OUTPATIENT
Start: 2025-04-07 | End: 2025-04-14

## 2025-04-07 RX ORDER — HYDROCODONE BITARTRATE AND ACETAMINOPHEN 5; 325 MG/1; MG/1
1 TABLET ORAL
Qty: 30 TABLET | Refills: 0 | Status: SHIPPED | OUTPATIENT
Start: 2025-04-07 | End: 2025-04-14

## 2025-04-07 RX ORDER — CEPHALEXIN 500 MG/1
500 CAPSULE ORAL EVERY 8 HOURS
Qty: 21 CAPSULE | Refills: 0 | Status: SHIPPED | OUTPATIENT
Start: 2025-04-07 | End: 2025-04-14

## 2025-04-07 NOTE — H&P (VIEW-ONLY)
Name: Olimpia Kendall    : 1960     [facility]  [address], [address]  [facility] [address]  Dept: [phone]  Dept Fax: [phone]     Chief Complaint   Patient presents with    Knee Pain    Fracture        There were no vitals taken for this visit.     No Known Allergies     Current Outpatient Medications   Medication Sig Dispense Refill    aspirin 325 MG tablet Take 1 tablet by mouth in the morning and at bedtime 60 tablet 0    FLUoxetine (PROZAC) 20 MG capsule Take 1 capsule by mouth daily      LORazepam (ATIVAN) 0.5 MG tablet Take 1 tablet by mouth every 6 hours as needed for Anxiety.      Cholecalciferol (VITAMIN D) 50 MCG ( UT) CAPS capsule Take by mouth daily      atorvastatin (LIPITOR) 10 MG tablet Take 1 tablet by mouth daily      cyanocobalamin 1000 MCG tablet Take 1 tablet by mouth daily      zolpidem (AMBIEN) 5 MG tablet Take 2 tablets by mouth nightly as needed for Sleep.       No current facility-administered medications for this visit.       Patient Active Problem List   Diagnosis    Humeral head fracture, right, open, initial encounter    Osteoporosis, post-menopausal    Closed patellar sleeve fracture of right knee, initial encounter      Family History   Problem Relation Age of Onset    Diabetes Mother     Hypertension Mother     Osteoporosis Mother     Brain Cancer Father        Past Surgical History:   Procedure Laterality Date    APPENDECTOMY      CHOLECYSTECTOMY      GASTRIC BYPASS SURGERY      HAND/FINGER SURGERY UNLISTED Left     cyst removal    HYSTERECTOMY (CERVIX STATUS UNKNOWN)      PATELLA FRACTURE SURGERY Right 2025    RIGHT ORIF PATELLA FRACTURE performed by Lane Orellana MD at [facility] MAIN OR      Past Medical History:   Diagnosis Date    Abnormal nuclear cardiac imaging test 2012    Low risk.  No ischemia or prior infarction.  No RWMA.  EF 64%.  Nondiagnostic

## 2025-04-07 NOTE — PROGRESS NOTES
Name: Olimpia Kendall    : 1960     [facility]  [address], [address]  [facility] [address]  Dept: [phone]  Dept Fax: [phone]     Chief Complaint   Patient presents with    Knee Pain    Fracture        There were no vitals taken for this visit.     No Known Allergies     Current Outpatient Medications   Medication Sig Dispense Refill    aspirin 325 MG tablet Take 1 tablet by mouth in the morning and at bedtime 60 tablet 0    FLUoxetine (PROZAC) 20 MG capsule Take 1 capsule by mouth daily      LORazepam (ATIVAN) 0.5 MG tablet Take 1 tablet by mouth every 6 hours as needed for Anxiety.      Cholecalciferol (VITAMIN D) 50 MCG ( UT) CAPS capsule Take by mouth daily      atorvastatin (LIPITOR) 10 MG tablet Take 1 tablet by mouth daily      cyanocobalamin 1000 MCG tablet Take 1 tablet by mouth daily      zolpidem (AMBIEN) 5 MG tablet Take 2 tablets by mouth nightly as needed for Sleep.       No current facility-administered medications for this visit.       Patient Active Problem List   Diagnosis    Humeral head fracture, right, open, initial encounter    Osteoporosis, post-menopausal    Closed patellar sleeve fracture of right knee, initial encounter      Family History   Problem Relation Age of Onset    Diabetes Mother     Hypertension Mother     Osteoporosis Mother     Brain Cancer Father        Past Surgical History:   Procedure Laterality Date    APPENDECTOMY      CHOLECYSTECTOMY      GASTRIC BYPASS SURGERY      HAND/FINGER SURGERY UNLISTED Left     cyst removal    HYSTERECTOMY (CERVIX STATUS UNKNOWN)      PATELLA FRACTURE SURGERY Right 2025    RIGHT ORIF PATELLA FRACTURE performed by Lane Orellana MD at [facility] MAIN OR      Past Medical History:   Diagnosis Date    Abnormal nuclear cardiac imaging test 2012    Low risk.  No ischemia or prior infarction.  No RWMA.  EF 64%.  Nondiagnostic

## 2025-04-08 DIAGNOSIS — T84.498D MECHANICAL COMPLICATION INTERNAL FIXATION DEVICE LIKE NAIL, PLATE, ROD, SUBSEQUENT ENCOUNTER: ICD-10-CM

## 2025-04-21 ENCOUNTER — ANESTHESIA EVENT (OUTPATIENT)
Age: 65
End: 2025-04-21
Payer: MEDICARE

## 2025-04-21 RX ORDER — SODIUM CHLORIDE 0.9 % (FLUSH) 0.9 %
5-40 SYRINGE (ML) INJECTION PRN
Status: CANCELLED | OUTPATIENT
Start: 2025-05-05

## 2025-04-21 RX ORDER — SODIUM CHLORIDE 9 MG/ML
INJECTION, SOLUTION INTRAVENOUS PRN
Status: CANCELLED | OUTPATIENT
Start: 2025-05-05

## 2025-05-01 RX ORDER — SODIUM CHLORIDE 0.9 % (FLUSH) 0.9 %
5-40 SYRINGE (ML) INJECTION PRN
Status: CANCELLED | OUTPATIENT
Start: 2025-05-05

## 2025-05-01 RX ORDER — CELECOXIB 200 MG/1
200 CAPSULE ORAL ONCE
Status: CANCELLED | OUTPATIENT
Start: 2025-05-05 | End: 2025-05-01

## 2025-05-01 RX ORDER — SODIUM CHLORIDE 0.9 % (FLUSH) 0.9 %
5-40 SYRINGE (ML) INJECTION EVERY 12 HOURS SCHEDULED
Status: CANCELLED | OUTPATIENT
Start: 2025-05-05

## 2025-05-05 ENCOUNTER — APPOINTMENT (OUTPATIENT)
Age: 65
End: 2025-05-05
Attending: ORTHOPAEDIC SURGERY
Payer: MEDICARE

## 2025-05-05 ENCOUNTER — ANESTHESIA (OUTPATIENT)
Age: 65
End: 2025-05-05
Payer: MEDICARE

## 2025-05-05 ENCOUNTER — HOSPITAL ENCOUNTER (OUTPATIENT)
Age: 65
Setting detail: OUTPATIENT SURGERY
Discharge: HOME | End: 2025-05-05
Attending: ORTHOPAEDIC SURGERY | Admitting: ORTHOPAEDIC SURGERY
Payer: MEDICARE

## 2025-05-05 VITALS
OXYGEN SATURATION: 99 % | DIASTOLIC BLOOD PRESSURE: 64 MMHG | SYSTOLIC BLOOD PRESSURE: 124 MMHG | BODY MASS INDEX: 26.68 KG/M2 | WEIGHT: 170 LBS | TEMPERATURE: 97.6 F | RESPIRATION RATE: 16 BRPM | HEIGHT: 67 IN | HEART RATE: 88 BPM

## 2025-05-05 PROCEDURE — 3700000001 HC ADD 15 MINUTES (ANESTHESIA): Performed by: ORTHOPAEDIC SURGERY

## 2025-05-05 PROCEDURE — 3600000004 HC SURGERY LEVEL 4 BASE: Performed by: ORTHOPAEDIC SURGERY

## 2025-05-05 PROCEDURE — 2709999900 HC NON-CHARGEABLE SUPPLY: Performed by: ORTHOPAEDIC SURGERY

## 2025-05-05 PROCEDURE — 2500000003 HC RX 250 WO HCPCS: Performed by: NURSE ANESTHETIST, CERTIFIED REGISTERED

## 2025-05-05 PROCEDURE — 7100000001 HC PACU RECOVERY - ADDTL 15 MIN: Performed by: ORTHOPAEDIC SURGERY

## 2025-05-05 PROCEDURE — 6360000002 HC RX W HCPCS: Performed by: NURSE ANESTHETIST, CERTIFIED REGISTERED

## 2025-05-05 PROCEDURE — 7100000000 HC PACU RECOVERY - FIRST 15 MIN: Performed by: ORTHOPAEDIC SURGERY

## 2025-05-05 PROCEDURE — 6370000000 HC RX 637 (ALT 250 FOR IP)

## 2025-05-05 PROCEDURE — 2500000003 HC RX 250 WO HCPCS

## 2025-05-05 PROCEDURE — 3700000000 HC ANESTHESIA ATTENDED CARE: Performed by: ORTHOPAEDIC SURGERY

## 2025-05-05 PROCEDURE — 3600000014 HC SURGERY LEVEL 4 ADDTL 15MIN: Performed by: ORTHOPAEDIC SURGERY

## 2025-05-05 PROCEDURE — 2580000003 HC RX 258

## 2025-05-05 PROCEDURE — 2500000003 HC RX 250 WO HCPCS: Performed by: ORTHOPAEDIC SURGERY

## 2025-05-05 PROCEDURE — 7100000010 HC PHASE II RECOVERY - FIRST 15 MIN: Performed by: ORTHOPAEDIC SURGERY

## 2025-05-05 PROCEDURE — 6360000002 HC RX W HCPCS

## 2025-05-05 PROCEDURE — 2580000003 HC RX 258: Performed by: ORTHOPAEDIC SURGERY

## 2025-05-05 PROCEDURE — 6360000002 HC RX W HCPCS: Performed by: ORTHOPAEDIC SURGERY

## 2025-05-05 PROCEDURE — 73560 X-RAY EXAM OF KNEE 1 OR 2: CPT

## 2025-05-05 PROCEDURE — 7100000011 HC PHASE II RECOVERY - ADDTL 15 MIN: Performed by: ORTHOPAEDIC SURGERY

## 2025-05-05 RX ORDER — ONDANSETRON 2 MG/ML
4 INJECTION INTRAMUSCULAR; INTRAVENOUS EVERY 6 HOURS PRN
Status: DISCONTINUED | OUTPATIENT
Start: 2025-05-05 | End: 2025-05-05 | Stop reason: HOSPADM

## 2025-05-05 RX ORDER — SODIUM CHLORIDE 0.9 % (FLUSH) 0.9 %
5-40 SYRINGE (ML) INJECTION PRN
Status: DISCONTINUED | OUTPATIENT
Start: 2025-05-05 | End: 2025-05-05 | Stop reason: HOSPADM

## 2025-05-05 RX ORDER — EPHEDRINE SULFATE/0.9% NACL/PF 25 MG/5 ML
SYRINGE (ML) INTRAVENOUS
Status: DISCONTINUED | OUTPATIENT
Start: 2025-05-05 | End: 2025-05-05 | Stop reason: SDUPTHER

## 2025-05-05 RX ORDER — ONDANSETRON 4 MG/1
8 TABLET, ORALLY DISINTEGRATING ORAL EVERY 8 HOURS PRN
Status: DISCONTINUED | OUTPATIENT
Start: 2025-05-05 | End: 2025-05-05 | Stop reason: HOSPADM

## 2025-05-05 RX ORDER — SODIUM CHLORIDE, SODIUM LACTATE, POTASSIUM CHLORIDE, CALCIUM CHLORIDE 600; 310; 30; 20 MG/100ML; MG/100ML; MG/100ML; MG/100ML
INJECTION, SOLUTION INTRAVENOUS CONTINUOUS
Status: DISCONTINUED | OUTPATIENT
Start: 2025-05-05 | End: 2025-05-05 | Stop reason: HOSPADM

## 2025-05-05 RX ORDER — IPRATROPIUM BROMIDE AND ALBUTEROL SULFATE 2.5; .5 MG/3ML; MG/3ML
1 SOLUTION RESPIRATORY (INHALATION)
Status: DISCONTINUED | OUTPATIENT
Start: 2025-05-05 | End: 2025-05-05 | Stop reason: HOSPADM

## 2025-05-05 RX ORDER — HYDROCODONE BITARTRATE AND ACETAMINOPHEN 5; 325 MG/1; MG/1
2 TABLET ORAL EVERY 4 HOURS PRN
Status: DISCONTINUED | OUTPATIENT
Start: 2025-05-05 | End: 2025-05-05 | Stop reason: HOSPADM

## 2025-05-05 RX ORDER — LABETALOL HYDROCHLORIDE 5 MG/ML
10 INJECTION, SOLUTION INTRAVENOUS
Status: DISCONTINUED | OUTPATIENT
Start: 2025-05-05 | End: 2025-05-05 | Stop reason: HOSPADM

## 2025-05-05 RX ORDER — BUPIVACAINE HYDROCHLORIDE 2.5 MG/ML
INJECTION, SOLUTION EPIDURAL; INFILTRATION; INTRACAUDAL PRN
Status: DISCONTINUED | OUTPATIENT
Start: 2025-05-05 | End: 2025-05-05 | Stop reason: ALTCHOICE

## 2025-05-05 RX ORDER — FENTANYL CITRATE 50 UG/ML
25 INJECTION, SOLUTION INTRAMUSCULAR; INTRAVENOUS EVERY 5 MIN PRN
Status: DISCONTINUED | OUTPATIENT
Start: 2025-05-05 | End: 2025-05-05 | Stop reason: HOSPADM

## 2025-05-05 RX ORDER — KETOROLAC TROMETHAMINE 30 MG/ML
30 INJECTION, SOLUTION INTRAMUSCULAR; INTRAVENOUS EVERY 6 HOURS PRN
Status: CANCELLED | OUTPATIENT
Start: 2025-05-05 | End: 2025-05-10

## 2025-05-05 RX ORDER — SODIUM CHLORIDE 9 MG/ML
INJECTION, SOLUTION INTRAVENOUS PRN
Status: DISCONTINUED | OUTPATIENT
Start: 2025-05-05 | End: 2025-05-05 | Stop reason: HOSPADM

## 2025-05-05 RX ORDER — HYDROCODONE BITARTRATE AND ACETAMINOPHEN 5; 325 MG/1; MG/1
1 TABLET ORAL EVERY 4 HOURS PRN
Status: DISCONTINUED | OUTPATIENT
Start: 2025-05-05 | End: 2025-05-05 | Stop reason: HOSPADM

## 2025-05-05 RX ORDER — MIDAZOLAM HYDROCHLORIDE 1 MG/ML
INJECTION, SOLUTION INTRAMUSCULAR; INTRAVENOUS
Status: DISCONTINUED | OUTPATIENT
Start: 2025-05-05 | End: 2025-05-05 | Stop reason: SDUPTHER

## 2025-05-05 RX ORDER — SODIUM CHLORIDE 0.9 % (FLUSH) 0.9 %
5-40 SYRINGE (ML) INJECTION EVERY 12 HOURS SCHEDULED
Status: DISCONTINUED | OUTPATIENT
Start: 2025-05-05 | End: 2025-05-05 | Stop reason: HOSPADM

## 2025-05-05 RX ORDER — FENTANYL CITRATE 50 UG/ML
INJECTION, SOLUTION INTRAMUSCULAR; INTRAVENOUS
Status: DISCONTINUED | OUTPATIENT
Start: 2025-05-05 | End: 2025-05-05 | Stop reason: SDUPTHER

## 2025-05-05 RX ORDER — NALOXONE HYDROCHLORIDE 0.4 MG/ML
INJECTION, SOLUTION INTRAMUSCULAR; INTRAVENOUS; SUBCUTANEOUS PRN
Status: DISCONTINUED | OUTPATIENT
Start: 2025-05-05 | End: 2025-05-05 | Stop reason: HOSPADM

## 2025-05-05 RX ORDER — PROPOFOL 10 MG/ML
INJECTION, EMULSION INTRAVENOUS
Status: DISCONTINUED | OUTPATIENT
Start: 2025-05-05 | End: 2025-05-05 | Stop reason: SDUPTHER

## 2025-05-05 RX ORDER — KETOROLAC TROMETHAMINE 30 MG/ML
15 INJECTION, SOLUTION INTRAMUSCULAR; INTRAVENOUS ONCE
Status: COMPLETED | OUTPATIENT
Start: 2025-05-05 | End: 2025-05-05

## 2025-05-05 RX ORDER — ONDANSETRON 2 MG/ML
4 INJECTION INTRAMUSCULAR; INTRAVENOUS
Status: DISCONTINUED | OUTPATIENT
Start: 2025-05-05 | End: 2025-05-05 | Stop reason: HOSPADM

## 2025-05-05 RX ORDER — ACETAMINOPHEN 500 MG
1000 TABLET ORAL EVERY 6 HOURS PRN
Status: CANCELLED | OUTPATIENT
Start: 2025-05-05

## 2025-05-05 RX ORDER — DIPHENHYDRAMINE HYDROCHLORIDE 50 MG/ML
12.5 INJECTION, SOLUTION INTRAMUSCULAR; INTRAVENOUS
Status: DISCONTINUED | OUTPATIENT
Start: 2025-05-05 | End: 2025-05-05 | Stop reason: HOSPADM

## 2025-05-05 RX ADMIN — MIDAZOLAM 2 MG: 1 INJECTION INTRAMUSCULAR; INTRAVENOUS at 08:52

## 2025-05-05 RX ADMIN — HYDROCODONE BITARTRATE AND ACETAMINOPHEN 1 TABLET: 5; 325 TABLET ORAL at 10:34

## 2025-05-05 RX ADMIN — KETOROLAC TROMETHAMINE 15 MG: 30 INJECTION, SOLUTION INTRAMUSCULAR at 09:58

## 2025-05-05 RX ADMIN — EPHEDRINE SULFATE 10 MG: 5 INJECTION INTRAVENOUS at 09:09

## 2025-05-05 RX ADMIN — PROPOFOL 200 MG: 10 INJECTION, EMULSION INTRAVENOUS at 08:52

## 2025-05-05 RX ADMIN — FENTANYL CITRATE 100 MCG: 50 INJECTION INTRAMUSCULAR; INTRAVENOUS at 08:52

## 2025-05-05 RX ADMIN — SODIUM CHLORIDE, SODIUM LACTATE, POTASSIUM CHLORIDE, AND CALCIUM CHLORIDE: .6; .31; .03; .02 INJECTION, SOLUTION INTRAVENOUS at 07:21

## 2025-05-05 RX ADMIN — WATER 2000 MG: 1 INJECTION INTRAMUSCULAR; INTRAVENOUS; SUBCUTANEOUS at 08:52

## 2025-05-05 ASSESSMENT — PAIN - FUNCTIONAL ASSESSMENT
PAIN_FUNCTIONAL_ASSESSMENT: 0-10
PAIN_FUNCTIONAL_ASSESSMENT: NONE - DENIES PAIN

## 2025-05-05 ASSESSMENT — PAIN DESCRIPTION - DESCRIPTORS
DESCRIPTORS: ACHING

## 2025-05-05 ASSESSMENT — PAIN DESCRIPTION - LOCATION
LOCATION: KNEE

## 2025-05-05 ASSESSMENT — PAIN DESCRIPTION - ORIENTATION
ORIENTATION: RIGHT

## 2025-05-05 ASSESSMENT — PAIN SCALES - GENERAL
PAINLEVEL_OUTOF10: 5

## 2025-05-05 NOTE — OP NOTE
Operative Note    Patient: Olimpia Kendall MRN: [id number]  Surgery Date: 5/5/2025           Procedure  Primary Surgeon    RIGHT KNEE - PATELLAR WIRE REMOVAL (CASE ZERO)  Lane Orellana MD    * Panel 2 does not exist *  * Panel 2 does not exist *    * Panel 3 does not exist *  * Panel 3 does not exist *     Surgeons and Role:     * Lane Orellana MD - Primary    Other OR Staff/Assistants:  Circulator: Kavita Rodrigez RN  Surgical Assistant: Checo Granger  Scrub Person First: Emelia Dahl    1st Assistant Tasks:  Closing    Pre-operative Diagnosis:  Mechanical complication internal fixation device like nail, plate, sary, subsequent encounter [T89.358D]    Post-operative Diagnosis: same as preop diagnosis    Anesthesia Type: General     Findings: Retained hardware right knee status post patella ORIF    Complications: No    EBL: 5 cc    Specimens: None  Procedure: Removal of patellar wire    Operative note: Right knee was prepped and draped in sterile fashion after adequate anesthesia was given and a timeout is performed.  Large incision approximately 2 cm in length was made blunt dissection was performed down to the area over the wire was protruding.  Blunt dissection was performed around the wire.  Patient was found to have a broken wire.  At that point the wire was removed atraumatically.  There is no evidence of any other pathology.  Post wire removal fluoroscopy was documenting the removal of the wire.  There is no retained foreign body.  Copious rogation was performed.  Skin was closed with Vicryl and Monocryl.  Compressive dressing was applied.  Patient was taken to PACU in stable condition.    Of note fluoroscopy was utilized for approximately 5 seconds for assistance and removal of the wire.  Implants:   Implants       Type Not Specified       (Historical) Blade Sprl F/Hum Nl 40mm Ti -- - Implanted   (Right) Shoulder      Model/Cat number: [id number] : [employer]_    Lot number: [id number]

## 2025-05-05 NOTE — ANESTHESIA POSTPROCEDURE EVALUATION
Department of Anesthesiology  Postprocedure Note    Patient: Olimpia Kendall  MRN: [id number]  YOB: 1960  Date of evaluation: 5/5/2025    Procedure Summary       Date: 05/05/25 Room / Location: [facility] MAIN 03 / [facility] MAIN OR    Anesthesia Start: 0849 Anesthesia Stop: 0940    Procedure: RIGHT KNEE - PATELLAR WIRE REMOVAL (CASE ZERO) (Right: Knee) Diagnosis:       Mechanical complication internal fixation device like nail, plate, sary, subsequent encounter      (Mechanical complication internal fixation device like nail, plate, sary, subsequent encounter [T84.498D])    Surgeons: Lane Orellana MD Responsible Provider: Jose Lizarraga APRN - CRNA    Anesthesia Type: General ASA Status: 2            Anesthesia Type: General    Joan Phase I: Joan Score: 10    Joan Phase II:      Anesthesia Post Evaluation    Patient location during evaluation: PACU  Patient participation: complete - patient participated  Level of consciousness: awake and awake and alert  Pain score: 0  Airway patency: patent  Nausea & Vomiting: no nausea  Cardiovascular status: blood pressure returned to baseline  Respiratory status: acceptable  Hydration status: euvolemic  Multimodal analgesia pain management approach  Pain management: adequate    No notable events documented.

## 2025-05-05 NOTE — DISCHARGE INSTRUCTIONS
Lower Extremity Post-op Instructions:      Your first meal home should be clear liquids    If you are given antibiotics, start antibiotics evening of the surgery unless otherwise instructed. Start Pain meds, the night you have surgery if you need is. No alcohol while on pain meds postop.    An Ice bag should be applied to your operative site for at least 20 minutes four times a day. DO NOT USE HEAT-this may cause increased swelling and discomfort. You may move your toes as tolerated.     You may bear weight as tolerated unless physical therapy has instructed you otherwise with the weightbearing status.    Dressing should remain in place for 5 days.     If you have a brace on or a splint on than those dressings and splint needs to remain in place until the follow-up appointment.    No driving if you have a splint or a brace on.    Swelling and discoloration may be present post-operatively. This is normal.    If your job requires little physical activity you may return as you feel able. If your job requires excessive lifting or use of affected extremity, please discuss return to work date with your nurse or physician.    If you are given a virtual appointment please make sure you have a smart phone with the camera.    If you need refill of pain medication, call the office 2 business days prior to running out of medication. Please call during regular business hours, Medication refill requests will not be addressed during non-business hours. Please do not page the on-call provider for pain medication refills after hours.    If you have had an arthroscopic procedure you will be provided with with pictures.  Please bring those pictures at the follow-up appointment.  If you have a virtual appointment please have the pictures readily available during your virtual appointment.              Things to watch for:             Increased swelling of the surgical site             Spreading of redness around the incision site

## 2025-05-05 NOTE — ANESTHESIA PRE PROCEDURE
Department of Anesthesiology  Preprocedure Note       Name:  Olimpia Kendall   Age:  64 y.o.  :  1960                                          MRN:  [id number]         Date:  2025      Surgeon: Surgeon(s):  Lane Orellana MD    Procedure: Procedure(s):  RIGHT KNEE - PATELLAR WIRE REMOVAL (CASE ZERO)    Medications prior to admission:   Prior to Admission medications    Medication Sig Start Date End Date Taking? Authorizing Provider   FLUoxetine (PROZAC) 20 MG capsule Take 1 capsule by mouth daily   Yes Provider, Historical, MD   LORazepam (ATIVAN) 0.5 MG tablet Take 1 tablet by mouth every 6 hours as needed for Anxiety.   Yes Provider, Historical, MD   Cholecalciferol (VITAMIN D) 50 MCG ( UT) CAPS capsule Take by mouth daily   Yes Provider, MD Valerie   atorvastatin (LIPITOR) 10 MG tablet Take 1 tablet by mouth daily   Yes Automatic Reconciliation, Ar   cyanocobalamin 1000 MCG tablet Take 1 tablet by mouth daily   Yes Automatic Reconciliation, Ar   aspirin 325 MG tablet Take 1 tablet by mouth in the morning and at bedtime 25   Diana Hannon APRN - CNP   zolpidem (AMBIEN) 5 MG tablet Take 2 tablets by mouth nightly as needed for Sleep.    Automatic Reconciliation, Ar       Current medications:    Current Facility-Administered Medications   Medication Dose Route Frequency Provider Last Rate Last Admin   • sodium chloride flush 0.9 % injection 5-40 mL  5-40 mL IntraVENous 2 times per day Gloria Huntley APRN - CRNA       • lactated ringers infusion   IntraVENous Continuous Diana Hannon APRN - CNP       • ceFAZolin (ANCEF) 2,000 mg in sterile water 20 mL IV syringe  2,000 mg IntraVENous On Call to OR Diana Hannon APRN - CNP           Allergies:  No Known Allergies    Problem List:    Patient Active Problem List   Diagnosis Code   • Humeral head fracture, right, open, initial encounter S42.291B   • Osteoporosis, post-menopausal M81.0   • Closed patellar sleeve

## 2025-05-22 ENCOUNTER — OFFICE VISIT (OUTPATIENT)
Age: 65
End: 2025-05-22

## 2025-05-22 DIAGNOSIS — M25.561 RIGHT KNEE PAIN, UNSPECIFIED CHRONICITY: ICD-10-CM

## 2025-05-22 DIAGNOSIS — Z98.890 STATUS POST HARDWARE REMOVAL: Primary | ICD-10-CM

## 2025-05-22 PROCEDURE — 99024 POSTOP FOLLOW-UP VISIT: CPT | Performed by: ORTHOPAEDIC SURGERY

## 2025-05-22 RX ORDER — HYDROCODONE BITARTRATE AND ACETAMINOPHEN 5; 325 MG/1; MG/1
1 TABLET ORAL
Qty: 30 TABLET | Refills: 0 | Status: SHIPPED | OUTPATIENT
Start: 2025-05-22 | End: 2025-05-29

## 2025-05-22 NOTE — PROGRESS NOTES
2010    KNEE SURGERY Right 5/5/2025    RIGHT KNEE - PATELLAR WIRE REMOVAL (CASE ZERO) performed by Lane Orellana MD at [facility] MAIN OR    PATELLA FRACTURE SURGERY Right 1/20/2025    RIGHT ORIF PATELLA FRACTURE performed by Lane Orellana MD at [facility] MAIN OR      Past Medical History:   Diagnosis Date    Abnormal nuclear cardiac imaging test 07/13/2012    Low risk.  No ischemia or prior infarction.  No RWMA.  EF 64%.  Nondiagnostic EKG on EST.  Ex time 10 min 41 sec.    Bell's palsy     HLD (hyperlipidemia)     Lump     Palm of the left hand    Osteoporosis         I have reviewed and agree with PFSH and ROS and intake form in chart and the record furthermore I have reviewed prior medical record(s) regarding this patients care during this appointment.   Review of Systems:   Patient is a pleasant appearing individual, appropriately dressed, well hydrated, well nourished, who is alert, appropriately oriented for age, and in no acute distress with a normal gait and normal affect who does not appear to be in any significant pain.       Physical Exam:  Left Knee - Full Range of Motion, No crepitation, Grossly neurovascularly intact, Good cap refill, No skin lesion, No effusion, No gross instability, No point tenderness, No quadriceps weakness, No medial or lateral joint line tenderness, Negative Lachman's, Negative Juan's exam.      Encounter Diagnoses   Name Primary?    Right knee pain, unspecified chronicity     Status post hardware removal Yes      History of Present Illness  The patient presents for evaluation of knee pain.    She reports significant pain improvement after wire removal from her knee but experienced leg swelling yesterday due to overexertion. She applies cocoa butter, vitamin E oil, and massages the area. She engages in physical therapy, including stationary bike exercises, achieving full knee flexion. She uses ice for treatment and is considering resuming formal physical therapy. She requests a

## 2025-05-22 NOTE — PATIENT INSTRUCTIONS
Knee Pain or Injury: Care Instructions  Overview     Injuries are a common cause of knee problems. Sudden (acute) injuries may be caused by a direct blow to the knee. They can also be caused by abnormal twisting, bending, or falling on the knee. Pain, bruising, or swelling may be severe, and may start within minutes of the injury.  Overuse is another cause of knee pain. Other causes are climbing stairs, kneeling, and other activities that use the knee. Everyday wear and tear, especially as you get older, also can cause knee pain.  Rest, along with home treatment, often relieves pain and allows your knee to heal. If you have a serious knee injury, you may need tests and treatment.  Follow-up care is a key part of your treatment and safety. Be sure to make and go to all appointments, and call your doctor if you are having problems. It's also a good idea to know your test results and keep a list of the medicines you take.  How can you care for yourself at home?  Be safe with medicines. Read and follow all instructions on the label.  If the doctor gave you a prescription medicine for pain, take it as prescribed.  If you are not taking a prescription pain medicine, ask your doctor if you can take an over-the-counter medicine.  Rest and protect your knee. Take a break from any activity that may cause pain.  Put ice or a cold pack on your knee for 10 to 20 minutes at a time. Put a thin cloth between the ice and your skin.  Prop up a sore knee on a pillow when you ice it or anytime you sit or lie down for the next 3 days. Try to keep it above the level of your heart. This will help reduce swelling.  If your knee is not swollen, you can put moist heat, a heating pad, or a warm cloth on your knee.  If your doctor recommends an elastic bandage, sleeve, or other type of support for your knee, wear it as directed.  Follow your doctor's instructions about how much weight you can put on your leg. Use a cane, crutches, or a walker  7

## 2025-06-20 DIAGNOSIS — M25.561 RIGHT KNEE PAIN, UNSPECIFIED CHRONICITY: ICD-10-CM

## 2025-06-20 DIAGNOSIS — S82.091S: ICD-10-CM

## 2025-06-23 RX ORDER — ONDANSETRON 8 MG/1
TABLET, ORALLY DISINTEGRATING ORAL
Qty: 10 TABLET | Refills: 0 | Status: SHIPPED | OUTPATIENT
Start: 2025-06-23

## (undated) PROCEDURE — 0QSD04Z REPOSITION RIGHT PATELLA WITH INTERNAL FIXATION DEVICE, OPEN APPROACH: ICD-10-PCS

## (undated) DEVICE — GARMENT COMPR M FOR 13IN FT INTMIT SGL BLDR HEM FORC II

## (undated) DEVICE — PADDING CAST W6XL144IN WHT COT SYN RL NONADHESIVE SOF-ROL

## (undated) DEVICE — OCCLUSIVE GAUZE STRIP,3% BISMUTH TRIBROMOPHENATE IN PETROLATUM BLEND: Brand: XEROFORM

## (undated) DEVICE — NEPTUNE E-SEP SMOKE EVACUATION PENCIL, COATED, 70MM BLADE, PUSH BUTTON SWITCH: Brand: NEPTUNE E-SEP

## (undated) DEVICE — LIQUIBAND RAPID ADHESIVE 36/CS 0.8ML: Brand: MEDLINE

## (undated) DEVICE — SOLUTION IV 1000ML 0.9% SOD CHL

## (undated) DEVICE — 60-7070-106 TRNQT,DPSB,PLC BROWN: Brand: MEDLINE RENEWAL

## (undated) DEVICE — WRAP CLD THER COMPR UNIV BLK SHLDR FOAM REUSE PREM

## (undated) DEVICE — YANKAUER,BULB TIP,W/O VENT,RIGID,STERILE: Brand: MEDLINE

## (undated) DEVICE — SYRINGE 20ML LL S/C 50

## (undated) DEVICE — GLOVE ORANGE PI 8 1/2   MSG9085

## (undated) DEVICE — K WIRE FIX L285MM DIA2.5MM S STL W/ TRCR PNT
Type: IMPLANTABLE DEVICE | Site: SHOULDER | Status: NON-FUNCTIONAL
Removed: 2018-11-05

## (undated) DEVICE — GOWN,SIRUS,NONRNF,XLN/XL,20/CS: Brand: MEDLINE

## (undated) DEVICE — DRESSING,GAUZE,XEROFORM,CURAD,5"X9",ST: Brand: CURAD

## (undated) DEVICE — GOWN,AURORA,FABRIC-REINFORCED,X-LARGE: Brand: MEDLINE

## (undated) DEVICE — TOWEL,OR,DSP,ST,BLUE,STD,4/PK,20PK/CS: Brand: MEDLINE

## (undated) DEVICE — GLOVE SURG 7 BIOGEL PI ULTRATOUCH G

## (undated) DEVICE — TUBING, SUCTION, 9/32" X 10', STRAIGHT: Brand: MEDLINE

## (undated) DEVICE — GOWN SURG 2XL 56.5 IN AAMI LEVEL 3 ORBIS

## (undated) DEVICE — BANDAGE COBAN 6 IN WND 6INX5YD FOAM

## (undated) DEVICE — SHEET,DRAPE,70X100,STERILE: Brand: MEDLINE

## (undated) DEVICE — DRAPE,U/ SHT,SPLIT,PLAS,STERIL: Brand: MEDLINE

## (undated) DEVICE — SUTURE VICRYL + SZ 2-0 L27IN ABSRB UD CT-2 L26MM 1/2 CIR TAPR VCP269H

## (undated) DEVICE — HANDPIECE SET WITH HIGH FLOW TIP AND SUCTION TUBE: Brand: INTERPULSE

## (undated) DEVICE — STRIP,CLOSURE,WOUND,MEDI-STRIP,1/2X4: Brand: MEDLINE

## (undated) DEVICE — BANDAGE COBAN 4 IN COMPR W4INXL5YD FOAM COHESIVE QUIK STK SELF ADH SFT

## (undated) DEVICE — UNDERGLOVE SURG SZ 7.5 BLU LTX FREE SYN POLYISOPRENE

## (undated) DEVICE — HYPODERMIC SAFETY NEEDLE: Brand: MAGELLAN

## (undated) DEVICE — GLOVE SURG SZ 7 L12IN FNGR THK79MIL GRN LTX FREE

## (undated) DEVICE — PENCIL SMK EVAC L10FT TBNG NONSTICK ESU BLDE PLUMEPEN ELITE

## (undated) DEVICE — INTENDED FOR TISSUE SEPARATION, AND OTHER PROCEDURES THAT REQUIRE A SHARP SURGICAL BLADE TO PUNCTURE OR CUT.: Brand: BARD-PARKER SAFETY BLADES SIZE 15, STERILE

## (undated) DEVICE — SOLUTION IRRIG 500ML 0.9% SOD CHLO USP POUR PLAS BTL

## (undated) DEVICE — (D)PREP SKN CHLRAPRP APPL 26ML -- CONVERT TO ITEM 371833

## (undated) DEVICE — FLEX ADVANTAGE 3000CC: Brand: FLEX ADVANTAGE

## (undated) DEVICE — SYSTEM LBL CORRECT MED 2 MED

## (undated) DEVICE — STOCKINETTE ORTHOPEDIC IMPERV 36X9 IN STRL

## (undated) DEVICE — KIT CLN UP BON SECOURS MARYV

## (undated) DEVICE — KERLIX BANDAGE ROLL: Brand: KERLIX

## (undated) DEVICE — PACK PROCEDURE SURG TOT HIP BSHR LF

## (undated) DEVICE — BANDAGE, ELASTIC, COTTON/POLYESTER/SPANDEX, DUAL SELF CLOSURE, NON-STERILE, LATEX-FREE, NATURAL, 6"X5YD: Brand: TRONEX INTERNATIONAL, INC.

## (undated) DEVICE — THE STERILE LIGHT HANDLE COVER IS USED WITH STERIS SURGICAL LIGHTING AND VISUALIZATION SYSTEMS.

## (undated) DEVICE — SUTURE VICRYL + SZ 3-0 L27IN ABSRB UD L24MM PS-1 3/8 CIR PRIM VCP936H

## (undated) DEVICE — GLOVE SURG SZ 65 THK91MIL LTX FREE SYN POLYISOPRENE

## (undated) DEVICE — SPONGE LAP W18XL18IN WHT COT 4 PLY FLD STRUNG RADPQ DISP ST 2 PER PACK

## (undated) DEVICE — GEL BAG FOR WRAPS --

## (undated) DEVICE — STRIP SKIN CLSR W0.25XL4IN WHT SPUNBOUND FBR NYL HI ADH

## (undated) DEVICE — 1010 S-DRAPE TOWEL DRAPE 10/BX: Brand: STERI-DRAPE™

## (undated) DEVICE — PAD,ABDOMINAL,5"X9",STERILE,LF,1/PK: Brand: MEDLINE INDUSTRIES, INC.

## (undated) DEVICE — DRAPE EQUIP FLROSCP 36X44 IN CVR W/TAPE

## (undated) DEVICE — DRAPE,TOP,102X53,STERILE: Brand: MEDLINE

## (undated) DEVICE — BIT DRL L165MM DIA4.5MM JCBS CHK L135MM STP CANN

## (undated) DEVICE — GAUZE SPONGES,16 PLY: Brand: CURITY

## (undated) DEVICE — INTENDED FOR TISSUE SEPARATION, AND OTHER PROCEDURES THAT REQUIRE A SHARP SURGICAL BLADE TO PUNCTURE OR CUT.: Brand: BARD-PARKER SAFETY BLADES SIZE 10, STERILE

## (undated) DEVICE — SPONGE LAP SOFT 18X18 IN X RAY DETECTABLE

## (undated) DEVICE — GAUZE,SPONGE,4"X4",16PLY,STRL,LF,10/TRAY: Brand: MEDLINE

## (undated) DEVICE — GLOVE SURG UNDERGLOVE 7.5 PF BLU BIOGEL PI MIC LF

## (undated) DEVICE — MASTISOL ADHESIVE LIQ 2/3ML

## (undated) DEVICE — SUTURE ORTHOCORD SZ 2-0 L36IN ABSRB VLT COMP BRAID L22MM 223104

## (undated) DEVICE — SUTURE VICRYL + SZ 0 L27IN ANTIBACTERIAL POLYGLACTIN 910 W VCP280H

## (undated) DEVICE — SUTURE VICRYL COAT SZ 4-0 L18IN ABSRB UD L19MM PS-2 1/2 CIR J496G

## (undated) DEVICE — WRAP COMPR W4INXL5YD NONSTERILE TAN SELF ADH COBAN

## (undated) DEVICE — Device

## (undated) DEVICE — 3M™ COBAN™ NL STERILE NON-LATEX SELF-ADHERENT WRAP, 2086S, 6 IN X 5 YD (15 CM X 4,5 M), 12 ROLLS/CASE: Brand: 3M™ COBAN™

## (undated) DEVICE — APPLICATOR MEDICATED 26 CC SOLUTION CLR STRL CHLORAPREP

## (undated) DEVICE — SOLUTION IRRIG 3000ML 0.9% SOD CHL FLX CONT 0797208] ICU MEDICAL INC]

## (undated) DEVICE — PACK,EXTREMITY III: Brand: MEDLINE

## (undated) DEVICE — GLOVE SURG SZ 65 L12IN FNGR THK79MIL GRN LTX FREE

## (undated) DEVICE — BASIC SINGLE BASIN-LF: Brand: MEDLINE INDUSTRIES, INC.

## (undated) DEVICE — KENDALL SCD EXPRESS SLEEVES, KNEE LENGTH, MEDIUM: Brand: KENDALL SCD

## (undated) DEVICE — NEEDLE NRV BLK 21GA L4IN 30DEG INSUL BVL EXTN SET STIMUPLEX

## (undated) DEVICE — 3M™ BAIR PAWS FLEX™ WARMING GOWN, STANDARD, 20 PER CASE 81003: Brand: BAIR PAWS™

## (undated) DEVICE — SOLUTION WOUND IRRIGATION PRONTOSAN 350ML

## (undated) DEVICE — PADDING UNDERCAST W6INXL4YD RAYON POLY SYN NONADHESIVE

## (undated) DEVICE — BANDAGE COMPR W6INXL5YD WHT BGE POLY COT M E WRP WV HK AND

## (undated) DEVICE — DRAPE XR C ARM 41X74IN LF --

## (undated) DEVICE — SUTURE VICRYL SZ 2-0 L27IN ABSRB UD L26MM CT-2 1/2 CIR J269H

## (undated) DEVICE — STOCKINETTE ORTH W9XL36IN COT 2 PLY HLLW FOR HANDLING LMB

## (undated) DEVICE — REM POLYHESIVE ADULT PATIENT RETURN ELECTRODE: Brand: VALLEYLAB

## (undated) DEVICE — ELECTRODE PT RET AD L9FT HI MOIST COND ADH HYDRGEL CORDED

## (undated) DEVICE — SYRINGE IRRIG 60ML SFT PLIABLE BLB EZ TO GRP 1 HND USE W/

## (undated) DEVICE — SOFT SILICONE HYDROCELLULAR SACRUM DRESSING WITH LOCK AWAY LAYER: Brand: ALLEVYN LIFE SACRUM (LARGE) PACK OF 10

## (undated) DEVICE — SPONGE GZ W4XL4IN RAYON POLY 4 PLY NONWOVEN FASTER WICKING

## (undated) DEVICE — BIT DRL L330MM DIA3.2MM CALIB L100MM NONSTERILE 3 FLUT QUIK

## (undated) DEVICE — COUNTER NDL 40 COUNT HLD 70 FOAM BLK ADH W/ MAG

## (undated) DEVICE — 60-7070-105 TRNQT,DPSB,PLC BLUE: Brand: MEDLINE RENEWAL

## (undated) DEVICE — SYRINGE BLB 60 CC TIP PROTECTOR STRL LF

## (undated) DEVICE — TUBING SUCT 10FR MAL ALUM SHFT FN CAP VENT UNIV CONN W/ OBT

## (undated) DEVICE — SUTURE ORTHOCORD SZ 2 ABSRB VLT W O NDL POLYDIOXANONE MFIL 223105

## (undated) DEVICE — GUIDEWIRE SURG L240MM DIA2MM S STL TRCR TIP FOR HUM NAIL